# Patient Record
Sex: MALE | Race: WHITE | NOT HISPANIC OR LATINO | Employment: OTHER | URBAN - METROPOLITAN AREA
[De-identification: names, ages, dates, MRNs, and addresses within clinical notes are randomized per-mention and may not be internally consistent; named-entity substitution may affect disease eponyms.]

---

## 2019-08-27 DIAGNOSIS — I63.50 CEREBROVASCULAR ACCIDENT DUE TO CEREBRAL ARTERY OCCLUSION: Primary | ICD-10-CM

## 2019-09-04 ENCOUNTER — CLINICAL SUPPORT (OUTPATIENT)
Dept: REHABILITATION | Facility: HOSPITAL | Age: 74
End: 2019-09-04
Payer: MEDICARE

## 2019-09-04 DIAGNOSIS — R47.01 COMBINED RECEPTIVE AND EXPRESSIVE APHASIA: Primary | ICD-10-CM

## 2019-09-04 PROCEDURE — G9160 LANG COMP GOAL STATUS: HCPCS | Mod: CJ,PN

## 2019-09-04 PROCEDURE — G9159 LANG COMP CURRENT STATUS: HCPCS | Mod: CK,PN

## 2019-09-04 PROCEDURE — G9162 LANG EXPRESS CURRENT STATUS: HCPCS | Mod: CL,PN

## 2019-09-04 PROCEDURE — G9163 LANG EXPRESS GOAL STATUS: HCPCS | Mod: CL,PN

## 2019-09-04 NOTE — PROGRESS NOTES
"TIME RECORD    Date: 09/04/2019    Start Time:  1300  Stop Time:  1350    PROCEDURES:       UNTIMED  Procedure Time Min.   Speech-Language Eval Start: 1300  Stop: 1400 60   Scoring, interpreting & writing report Start: 1555  Stop: 1652 57     Total Timed Minutes:  117  Total Timed Units:  2  Total Untimed Units: 0  Charges Billed/# of units:  2    SPEECH THERAPY INITIAL PLAN OF TREATMENT    Patient Name: Agapito Bass  Physician Name: Riser    Primary Diagnosis: s/p CVA  Treatment Diagnosis: Severe Broca's aphasia (moderate receptive, severe expressive aphasia)  Onset Date: 6/7/2019  Eval Date: 9/4/2019  Certification Period: 9/4/2019 to 11/4/2019  Past Medical History: Per wife, there was no prior dysphagia or bnwwny-roosruic-tpamakvmr disorder.  She denied prior neuro, pulmonary or GI diagnoses or neck/throat surgery. Pt had aspiration pneumonia after the CVA, requiring intubation.  PEG tube was placed.  Pt had repeat MBSS on 8/23/2019 which cleared him to return to full p.o.dDiet, and PEG tube is to be removed next week.     Precautions: standard, fall  Prior Therapy:  Pt received inpatient rehab &, more recently, home health lblkpq-gtzpvvcu-zwlhwivye therapy.      Nutrition: full p.o. as of last week  Social Cultural Assessment: , retired, lives in the community with wife    Prior Level of Function: Independent  Functional Deficits Leading to Referral/Nature of Injury: moderate-severe communication deficits  Patient Therapy Goals: "talk"  Vision: adequate with eyeglasses for reading & distance  Hearing: apparent at least mild bilateral hearing loss    Receptive Language: impaired - moderate  Yes/No Response: intact to moderately complex information  Follows Direction: 1-Step - consistently indep and 2-Step - only point to tasks, did not follow complex tasks.  No left/right orientation.  Moderately decreased body part identification  Identifies pictures/objects: intact for common objects & " pictures  Auditory Comprehension of paragraph-length material: severe deficit  Reading Comprehension: matched single words to pictures 100% acc, followed 2-3 word written commands 100% acc.  Unable to comprehend written sentences.  Comments: due to hearing loss, pt must be cued to look at speaker to improve comprehension of information    Expressive Language: impaired - severe  Naming: severely impaired, paraphasias, neologisms & jargon  Repetition: mild-moderately impaired, with paraphasias, & neologisms  Sentence Completion: moderately impaired  Responsive naming: severely impaired  Spontaneous Speech: Non-fluent; single words, often paraphasias, effortful & hesitnat  Written Language: Signs Name: intact with dominant hand      Speech:  Intelligibility: Mod.dysarthria, decreased rate & precision of consonants, flat prosody,   Voice Quality: WFL  Pitch: WFL  Intensity: WFL  Apraxia: no oral or limb apraxia; moderate verbal apraxia noted as he was able to repeat to 4 syallable words, but exhibited moderate phonemic substitutions, repetition, dysprosody  Oral Mechanism:  Dentition - edentulous with upper plate only.    Function - WFL.   ROM - WFL, slight right labial/facial weakness, decreased pursing & seal with constant saliva loss (pt carries handkerchief to wipe mouth).    Cognition not tested due to severe expressive aphasia    Impressions: Per the Western Aphasia Battery (WAB) pt has Broca's aphasia with moderate receptive & severe expressive deficits.  Voice was intact.  No dysphagia, as pt was cleared for po diet last week per MBSS, and PEG tube will be removed next week.  Moderate dysarthria, moderate verbal apraxia.      Rehab Potential: good  Short Term Goals:   1. Complete assessment of reading & writing skills.  2. Follow 3 step directions presented verbally re body parts, left/right orientation, and objects, indep 75% trials.  3. Demo comprehension of moderately complex information he read (to 4  sentence paragraphs), indep, 75% acc.  4. Repeat 10 syllable sentence with 90% intelligibility.  5. Name pictures of 100 common objects given phonemic cues, 90% acc.  6. Further evaluation of augmentative system to improve communication of wants & needs.    Long Term Goals   1. Express wants & needs using noun-verb combinations and/or low-tech communication board, indep  2. Comprehend moderately complex information presented verbally about functional daily activities, SBA.    Recommended Treatment Plan (2 times per week for 9 weeks): Home Exercise Program, Compensatory Strategies, Facilitating Strategies and Language Re-Training      Therapist's Name: Claudia Chaudhry M.S., CCC-SLP/A   Date: 09/04/2019    I CERTIFY THE NEED FOR THESE SERVICES FURNISHED UNDER THIS PLAN OF TREATMENT AND WHILE UNDER MY CARE    Physician's comments: ________________________________________________________________________________________________________________________________________________      Physician's Name: ___________________________________

## 2019-09-09 ENCOUNTER — CLINICAL SUPPORT (OUTPATIENT)
Dept: REHABILITATION | Facility: HOSPITAL | Age: 74
End: 2019-09-09
Payer: MEDICARE

## 2019-09-09 DIAGNOSIS — R47.01 COMBINED RECEPTIVE AND EXPRESSIVE APHASIA: Primary | ICD-10-CM

## 2019-09-09 PROCEDURE — 92507 TX SP LANG VOICE COMM INDIV: CPT | Mod: PN

## 2019-09-09 NOTE — PROGRESS NOTES
"TIME RECORD    Date:  09/09/2019    Start Time:  0905   Stop Time:  0950    PROCEDURES:    UNTIMED  Procedure Time Min.   Speech language tx Start: 0905  Stop: 0950 45    Start:  Stop:     Start:  Stop:     Start:  Stop:    Total Untimed Units:  1  Charges Billed/# of units:  1      Progress/Current Status    Subjective:     Patient ID: Agapito Bass is a 73 y.o. male.  Diagnosis:   1. Combined receptive and expressive aphasia       "How are you" "okay"    Objective:     1. Complete assessment of reading & writing skills.  2. Follow 3 step directions presented verbally re body parts, left/right orientation, and objects, indep 75% trials.  3. Demo comprehension of moderately complex information he read (to 4 sentence paragraphs), indep, 75% acc.  4. Repeat 10 syllable sentence with 90% intelligibility.  5. Name pictures of 100 common objects given phonemic cues, 90% acc.  6. Further evaluation of augmentative system to improve communication of wants & needs. MET    Assessment:   Alert & cooperative, wife present.  Overall imprecise artic, struggle, substitutions & omissions.  2. Imitation using mirror & touch cues for OMEx  4. Repeated single syllable initial /b/ words, given side by side in mirror cues.  Increased stress with prolongation of final sounds( cathryn. /l,s,sh), and emphasis on /t,k,g,d/.  6. Wife reported they successfully use a written word augmentative system at home, established at rehab unit, for pt to point to to communicate pain, wants & needs when he cannot state them.  Pt was pre-morbid a poor speller.      Patient Education/Response:     Education to pt & wife re Home Exercise Program with printed instructions for OMEx & provision of tongue blades, & words to practice, with demo using mirror & gestures for wife to use to facilitate correct activities.    Plans and Goals:     Cont POC    "

## 2019-09-11 ENCOUNTER — CLINICAL SUPPORT (OUTPATIENT)
Dept: REHABILITATION | Facility: HOSPITAL | Age: 74
End: 2019-09-11
Payer: MEDICARE

## 2019-09-11 DIAGNOSIS — R47.01 COMBINED RECEPTIVE AND EXPRESSIVE APHASIA: Primary | ICD-10-CM

## 2019-09-11 PROCEDURE — 92507 TX SP LANG VOICE COMM INDIV: CPT | Mod: PN

## 2019-09-11 NOTE — PROGRESS NOTES
"TIME RECORD    Date:  09/11/2019    Start Time:  0900  Stop Time:  0945    PROCEDURES:       UNTIMED  Procedure Time Min.   Speech-language tx Start: 0900  Stop: 0945 45    Start:  Stop:    Total Untimed Units:  1  Charges Billed/# of units:  1      Progress/Current Status    Subjective:     Patient ID: Agapito Bass is a 73 y.o. male.  Diagnosis: No diagnosis found.    "How are you, you?"     Objective:      1. Complete assessment of reading & writing skills.  2. Follow 3 step directions presented verbally re body parts, left/right orientation, and objects, indep 75% trials.  3. Demo comprehension of moderately complex information he read (to 4 sentence paragraphs), indep, 75% acc.  4. Repeat 10 syllable sentence with 90% intelligibility.  5. Name pictures of 100 common objects given phonemic cues, 90% acc.  6. Further evaluation of augmentative system to improve communication of wants & needs. MET     Assessment:   Alert & cooperative, wife present.  Overall imprecise artic, struggle, substitutions & omissions.  1. Completed using WAB Section 2 which revealed severe reading & writing deficits (scores at 30% for each), moderate limb apraxia, and mild-moderate constructional/visuo-spatial deficits, but WFL calculation skills.     Patient Education/Response:      Copies of words to work on to address /t, k/ during Home Exercise Program for the next 5 days.      Plans and Goals:      Cont POC      "

## 2019-09-16 ENCOUNTER — CLINICAL SUPPORT (OUTPATIENT)
Dept: REHABILITATION | Facility: HOSPITAL | Age: 74
End: 2019-09-16
Payer: MEDICARE

## 2019-09-16 DIAGNOSIS — R47.01 COMBINED RECEPTIVE AND EXPRESSIVE APHASIA: Primary | ICD-10-CM

## 2019-09-16 PROCEDURE — 92507 TX SP LANG VOICE COMM INDIV: CPT | Mod: PN

## 2019-09-16 NOTE — PROGRESS NOTES
"TIME RECORD    Date:  09/16/2019    Start Time:  0900  Stop Time:  0945     PROCEDURES:                                        UNTIMED  Procedure Time Min.   Speech-language tx Start: 0900  Stop: 0945 45     Start:  Stop:     Total Untimed Units:  1  Charges Billed/# of units:  1        Progress/Current Status     Subjective:      Patient ID: Agapito Bass is a 73 y.o. male.  Diagnosis: Combined receptive & expressive aphasia     "I'm okay"     Objective:      1. Complete assessment of reading & writing skills. MET  2. Follow 3 step directions presented verbally re body parts, left/right orientation, and objects, indep 75% trials.  3. Demo comprehension of moderately complex information he read (to 4 sentence paragraphs), indep, 75% acc.  4. Repeat 10 syllable sentence with 90% intelligibility.  5. Name pictures of 100 common objects given phonemic cues, 90% acc.  6. Further evaluation of augmentative system to improve communication of wants & needs. MET     Assessment:   Alert & cooperative, wife present.  Overall imprecise artic, struggle, substitutions & omissions.  4. Repeated 1 syllable CV, VC & CVC words given line drawing stimuli & cue to look at therapists mouth, 80% acc. (paraphasias noted)    5. After 2 practice trials, given line drawings (same stimuli as in 4) pt named 1 of 20 indep.  Repeated all with 80% acc (paraphasias noted)     Patient Education/Response:      Copies of words to work on to address /t,d, p,b, k,g/ during Home Exercise Program for the next 7 days.  Review of tx techniques with pt & wife.     Plans and Goals:      Cont POC    "

## 2019-09-18 ENCOUNTER — CLINICAL SUPPORT (OUTPATIENT)
Dept: REHABILITATION | Facility: HOSPITAL | Age: 74
End: 2019-09-18
Payer: MEDICARE

## 2019-09-18 DIAGNOSIS — R47.01 COMBINED RECEPTIVE AND EXPRESSIVE APHASIA: Primary | ICD-10-CM

## 2019-09-18 PROCEDURE — 92507 TX SP LANG VOICE COMM INDIV: CPT | Mod: PN

## 2019-09-18 NOTE — PROGRESS NOTES
"TIME RECORD    Date:  09/18/2019    Start Time:  0900  Stop Time:  0945     PROCEDURES:     UNTIMED  Procedure Time Min.   Speech-language tx Start: 0900  Stop: 0945 45     Start:  Stop:     Total Untimed Units:  1  Charges Billed/# of units:  1        Progress/Current Status     Subjective:      Patient ID: Agapito Bass is a 73 y.o. male.  Diagnosis: Combined receptive & expressive aphasia     "I uh, uh, three, uh, ...)"     Objective:      1. Complete assessment of reading & writing skills. MET  2. Follow 3 step directions presented verbally re body parts, left/right orientation, and objects, indep 75% trials.  3. Demo comprehension of moderately complex information he read (to 4 sentence paragraphs), indep, 75% acc.  4. Repeat 10 syllable sentence with 90% intelligibility.  5. Name pictures of 100 common objects given phonemic cues, 90% acc.  6. Further evaluation of augmentative system to improve communication of wants & needs. MET     Assessment:   Alert & cooperative, wife present.  Wife translated he wanted to say he woke up at 3 with a panic attack..  Overall imprecise artic, struggle, substitutions & omissions.  4, 5. Repeated 1 syllable /t,d,p,b,k,g/ CV, VC & CVC initial & final sound words, & CVC with initial blends words, given line drawing and picture stimuli & cue to look at therapists mouth, 80% acc. (paraphasias noted, vowel distortions noted)         Patient Education/Response:      Education re techniques to reduce struggle in verbal apraxia, focus on "stop, wait, then try again" with success on 90% trials to produce target word.     Plans and Goals:      Cont POC    "

## 2019-09-23 ENCOUNTER — CLINICAL SUPPORT (OUTPATIENT)
Dept: REHABILITATION | Facility: HOSPITAL | Age: 74
End: 2019-09-23
Payer: MEDICARE

## 2019-09-23 DIAGNOSIS — R47.01 COMBINED RECEPTIVE AND EXPRESSIVE APHASIA: Primary | ICD-10-CM

## 2019-09-23 PROCEDURE — 92507 TX SP LANG VOICE COMM INDIV: CPT | Mod: PN

## 2019-09-23 NOTE — PROGRESS NOTES
TIME RECORD    Date:  09/23/2019    Start Time:  0900   Stop Time:  0945    PROCEDURES:        UNTIMED  Procedure Time Min.   Speech-language tx Start: 0900  Stop: 0945 45    Start:  Stop:      Total Untimed Units:  1  Charges Billed/# of units:  1      Progress/Current Status    Subjective:     Patient ID: Agapito Bass is a 74 y.o. male.  Diagnosis:   1. Combined receptive and expressive aphasia       Uh, yeah, uh, good    Objective:     2. Follow 3 step directions presented verbally re body parts, left/right orientation, and objects, indep 75% trials.  3. Demo comprehension of moderately complex information he read (to 4 sentence paragraphs), indep, 75% acc.  4. Repeat 10 syllable sentence with 90% intelligibility.  5. Name pictures of 100 common objects given phonemic cues, 90% acc.    Assessment:     2. 1 step 90% acc indep.  2 step 70% acc indep, 30% in imitation.  Notable improvement in L/R orientation  3. Read simple sentences to himself & pointed to correct answer when asked, 80% acc indep.  When reading aloud comprehension decreased significantly (due to struggle to say the correct word?)  4. Repeated 3 syllable sentences given cues to look at speaker's face & exaggerated oral movements, 40% intelligibility.  Repeated 2 syllable contrasting words with p/b, t/d, k/g with 60% acc.    Patient Education/Response:     Copies of body part commands given to pt & wife for HEP    Plans and Goals:     Cont POC

## 2019-09-25 ENCOUNTER — CLINICAL SUPPORT (OUTPATIENT)
Dept: REHABILITATION | Facility: HOSPITAL | Age: 74
End: 2019-09-25
Payer: MEDICARE

## 2019-09-25 DIAGNOSIS — R47.01 COMBINED RECEPTIVE AND EXPRESSIVE APHASIA: Primary | ICD-10-CM

## 2019-09-25 PROCEDURE — 92507 TX SP LANG VOICE COMM INDIV: CPT | Mod: PN

## 2019-09-25 PROCEDURE — G9160 LANG COMP GOAL STATUS: HCPCS | Mod: CI,PN

## 2019-09-25 PROCEDURE — G9159 LANG COMP CURRENT STATUS: HCPCS | Mod: CJ,PN

## 2019-09-25 NOTE — PROGRESS NOTES
"TIME RECORD    Date:  09/25/2019    Start Time:  0900  Stop Time:  0945    PROCEDURES:        UNTIMED  Procedure Time Min.   Speech-language tx Start: 0900  Stop: 0945 45    Start:  Stop:      Total Untimed Units:  1  Charges Billed/# of units:  1      Progress/Current Status    Subjective:     Patient ID: Agapito Bass is a 74 y.o. male.  Diagnosis:   1. Combined receptive and expressive aphasia         "Good"    Objective:      2. Follow 3 step directions presented verbally re body parts, left/right orientation, and objects, indep 75% trials.  3. Demo comprehension of moderately complex information he read (to 4 sentence paragraphs), indep, 75% acc.  4. Repeat 10 syllable sentence with 90% intelligibility.  5. Name pictures of 100 common objects given phonemic cues, 90% acc    Assessment:     3. 1 sentence paragraphs 90% acc indep  4. 3-4 syllable phrases immediately in imitation or after 5 sec delay 50% acc.  5. Imitation only 1 syllable words, focus on /d,k,g,f/    Patient Education/Response:     Review of HEP activities    Plans and Goals:     Cont POC    "

## 2019-10-07 ENCOUNTER — CLINICAL SUPPORT (OUTPATIENT)
Dept: REHABILITATION | Facility: HOSPITAL | Age: 74
End: 2019-10-07
Payer: MEDICARE

## 2019-10-07 PROCEDURE — 92507 TX SP LANG VOICE COMM INDIV: CPT | Mod: PN

## 2019-10-07 PROCEDURE — G8999 MOTOR SPEECH CURRENT STATUS: HCPCS | Mod: CK,PN

## 2019-10-07 NOTE — PROGRESS NOTES
"TIME RECORD    Date:  10/07/2019    Start Time:  0900  Stop Time:  0945     PROCEDURES:                                                    UNTIMED  Procedure Time Min.   Speech-language tx Start: 0900  Stop: 0945 45     Start:  Stop:        Total Untimed Units:  1  Charges Billed/# of units:  1        Progress/Current Status     Subjective:      Patient ID: Agapito Bass is a 74 y.o. male.  Diagnosis:   1. Combined receptive and expressive aphasia            "Good"     Objective:      2. Follow 3 step directions presented verbally re body parts, left/right orientation, and objects, indep 75% trials.  3. Demo comprehension of moderately complex information he read (to 4 sentence paragraphs), indep, 75% acc.  4. Repeat 10 syllable sentence with 90% intelligibility.  5. Name pictures of 100 common objects given phonemic cues, 90% acc     Assessment:    Was in the hospital last week for CHF.  Wife stated they do HEP everyday.    4. 3-4 syllable phrases immediately in imitation or after 5 sec delay 50% acc.  5. Imitation only 1-2 syllable words, focus on /d,k,g/  Significant struggle cathryn. /k/      Patient Education/Response:      Demo of use of 3-4 word phrases/sentences fduring home activities e.g. I want toast, I like oatmeal, I take a walk, etc.     Plans and Goals:      Cont POC    "

## 2019-10-09 ENCOUNTER — CLINICAL SUPPORT (OUTPATIENT)
Dept: REHABILITATION | Facility: HOSPITAL | Age: 74
End: 2019-10-09
Payer: MEDICARE

## 2019-10-09 DIAGNOSIS — R47.01 COMBINED RECEPTIVE AND EXPRESSIVE APHASIA: Primary | ICD-10-CM

## 2019-10-09 PROCEDURE — 92507 TX SP LANG VOICE COMM INDIV: CPT | Mod: PN

## 2019-10-09 NOTE — PROGRESS NOTES
"TIME RECORD    Date:  10/09/2019    Start Time:  0902  Stop Time:  0947     PROCEDURES:     UNTIMED  Procedure Time Min.   Speech-language tx Start: 0902  Stop: 0947 45     Start:  Stop:        Total Untimed Units:  1  Charges Billed/# of units:  1        Progress/Current Status     Subjective:      Patient ID: Agapito Bass is a 74 y.o. male.  Diagnosis:   1. Combined receptive and expressive aphasia            "I'm better"     Objective:      2. Follow 3 step directions presented verbally re body parts, left/right orientation, and objects, indep 75% trials.  3. Demo comprehension of moderately complex information he read (to 4 sentence paragraphs), indep, 75% acc.  4. Repeat 10 syllable sentence with 90% intelligibility.  5. Name pictures of 100 common objects given phonemic cues, 90% acc     Assessment:    Wife stated friends visited last night & he conversed with them much better than previously.      4. 3-5 syllable phrases immediately in imitation or after 5 sec delay 60% acc.  Struggle on /k, f/.  Able to imitate 3 syllable words with little problem.  Vowel distortions persist.    Patient Education/Response:      Demo of use of finger to spell on the table (with good response by communication partner to get the word he could not say), encouragement to write words he cannot speak, educ to wife re these techniques.     Plans and Goals:      Cont POC    "

## 2019-10-14 ENCOUNTER — CLINICAL SUPPORT (OUTPATIENT)
Dept: REHABILITATION | Facility: HOSPITAL | Age: 74
End: 2019-10-14
Payer: MEDICARE

## 2019-10-14 DIAGNOSIS — R47.01 COMBINED RECEPTIVE AND EXPRESSIVE APHASIA: Primary | ICD-10-CM

## 2019-10-14 PROCEDURE — G9160 LANG COMP GOAL STATUS: HCPCS | Mod: CH,PN

## 2019-10-14 PROCEDURE — 92507 TX SP LANG VOICE COMM INDIV: CPT | Mod: PN

## 2019-10-14 PROCEDURE — G9159 LANG COMP CURRENT STATUS: HCPCS | Mod: CI,PN

## 2019-10-14 NOTE — PROGRESS NOTES
"TIME RECORD    Date:  10/14/2019    Start Time:  0902  Stop Time:  0947     PROCEDURES:     UNTIMED  Procedure Time Min.   Speech-language tx Start: 0902  Stop: 0947 45     Start:  Stop:        Total Untimed Units:  1  Charges Billed/# of units:  1        Progress/Current Status     Subjective:      Patient ID: Agapito Bass is a 74 y.o. male.  Diagnosis:   1. Combined receptive and expressive aphasia            "I'm good."     Objective:      2. Follow 3 step directions presented verbally re body parts, left/right orientation, and objects, indep 75% trials.  3. Demo comprehension of moderately complex information he read (to 4 sentence paragraphs), indep, 75% acc.  4. Repeat 10 syllable sentence with 90% intelligibility.  5. Name pictures of 100 common objects given phonemic cues, 90% acc     Assessment:    Wife stated talks better when with her & with friends than in sessions.     4. Repeated 3-4 word phrases 80% acc.  Improved self-generation of "I want" and "I like" sentences given picture stim.  Unable to produce "don't" in a sentence.   Vowel distortions persist.  5. Named 30 single syllable pictures in imitation, 9 indep.     Patient Education/Response:      Continue HEP using simple "I like" and "I want" sentences.     Plans and Goals:      Cont POC    "

## 2019-10-16 ENCOUNTER — CLINICAL SUPPORT (OUTPATIENT)
Dept: REHABILITATION | Facility: HOSPITAL | Age: 74
End: 2019-10-16
Payer: MEDICARE

## 2019-10-16 DIAGNOSIS — R47.01 COMBINED RECEPTIVE AND EXPRESSIVE APHASIA: Primary | ICD-10-CM

## 2019-10-16 PROCEDURE — 92507 TX SP LANG VOICE COMM INDIV: CPT | Mod: PN

## 2019-10-16 NOTE — PROGRESS NOTES
"Outpatient [facility]rological Rehabilitation   Speech and Language Therapy Daily Note  Date:  10/16/2019     Name: Agapito Bass   MRN: [id number]   Therapy Diagnosis:   Encounter Diagnosis   Name Primary?    Combined receptive and expressive aphasia Yes   Physician: César Mejia MD  Physician Orders: ambulatory referral to speech therapy  Medical Diagnosis: CVA    Visit #/Visits authorized: 11/18  Date of Evaluation:  9/4/2019  Insurance Authorization Period: 9/4-11/4/2019  Plan of Care Expiration Date:    11/4/2019  Extended POC:  no     Time In:  0904  Time Out:  0955  Total Billable Time: 51     Precautions: Standard and Fall    Subjective:   Pt reports: "I'm ok"   He  was compliant to home exercise program: per wife he works with her every day on OMEXx, repeating words, following 1 step commands  Response to previous treatment: no concerns   Pain Scale:  0/10 on VAS currently.   Pain Location: na    Objective:     UNTIMED  Procedure Min.   {Speech- Language- Voice Therapy  51           Total Untimed Units: 1  Charges Billed/# of units: 1    2. Follow 3 step directions presented verbally re body parts, left/right orientation, and objects, indep 75% trials. Imitated 1 step at 90% acc; followed 1 step verbal directions without visual cues 40% acc.  3. Demo comprehension of moderately complex information he read (to 4 sentence paragraphs), indep, 75% acc. Demo'd comprehension of single sentences he read, 90% acc.  4. Repeat 10 syllable sentence with 90% intelligibility. Repeated up to 6 syllable sentences with fair intelligibility (distorted vowels, incorrect consonants) given visual & verbal model.  5. Name pictures of 100 common objects given phonemic cues, 90% acc. Named 2 of 25 common pix he had reviewed at several prior sessions, indep.    Patient Education/Response:   Education to pt & wife re continuing tOMEX & single step commands with objects & body parts, always providing a model if he does not correctly " comply.    Exercises were reviewed.  Agapito demonstrated good  understanding of the education provided.     Assessment:   Agapito is progressing well towards his goals.  Current goals remain appropriate. Goals to be updated as necessary.     Pt prognosis is Good. Pt will continue to benefit from skilled outpatient speech and language therapy to address the deficits listed in the problem list on initial evaluation, provide pt/family education and to maximize pt's level of independence in the home and community environment.     Barriers to Therapy: none  Pt's spiritual, cultural and educational needs considered and pt agreeable to plan of care and goals.    Plan:   Continue POC with focus on effectively communicating using any method.      Wife will have knee surgery after his session on 10/23 next week & pt will be unable to attend sessions until she can drive again, after the first of December.  Pt will be d/c on 10/23.  They can request a new order from Dr. Mejia once she is able to drive him to sessions again.    10/16/2019

## 2019-10-21 ENCOUNTER — CLINICAL SUPPORT (OUTPATIENT)
Dept: REHABILITATION | Facility: HOSPITAL | Age: 74
End: 2019-10-21
Payer: MEDICARE

## 2019-10-21 DIAGNOSIS — R47.01 COMBINED RECEPTIVE AND EXPRESSIVE APHASIA: Primary | ICD-10-CM

## 2019-10-21 PROCEDURE — 92507 TX SP LANG VOICE COMM INDIV: CPT | Mod: PN

## 2019-10-21 NOTE — PROGRESS NOTES
"Outpatient Neurological Rehabilitation   Speech and Language Therapy Daily Note  Date:  10/21/2019     Name: Agapito Bass   MRN: [id number]   Therapy Diagnosis:        Encounter Diagnosis   Name Primary?    Combined receptive and expressive aphasia Yes   Physician: César Mejia MD  Physician Orders: ambulatory referral to speech therapy  Medical Diagnosis: CVA     Visit #/Visits authorized: 12/18  Date of Evaluation:  9/4/2019  Insurance Authorization Period: 9/4-11/4/2019  Plan of Care Expiration Date:    11/4/2019  Extended POC:  no      Time In:  0900  Time Out:  0945  Total Billable Time: 45      Precautions: Standard and Fall     Subjective:   Pt reports: "I'm ok"   He  was compliant to home exercise program: per wife he works with her every day on OMEXx, repeating words, following 1 step commands  Response to previous treatment: no concerns   Pain Scale:  0/10 on VAS currently.   Pain Location: na     Objective:      UNTIMED  Procedure Min.   {Speech- Language- Voice Therapy  45            Total Untimed Units: 1  Charges Billed/# of units: 1     2. Follow 3 step directions presented verbally re body parts, left/right orientation, and objects, indep 75% trials. Imitated 1 step at 90% acc; followed 1 step verbal directions without visual cues 50% acc.  3. Demo comprehension of moderately complex information he read (to 4 sentence paragraphs), indep, 75% acc. Demo'd comprehension of single words he read, 90% acc.  4. Repeat 10 syllable sentence with 90% intelligibility. Repeated up to 6 syllable sentences with fair intelligibility (distorted vowels, incorrect consonants) given visual & verbal model. 10% accurate repetition without visual model  5. Name pictures of 100 common objects given phonemic cues, 90% acc. Named 4 of 15 common pix during responsive naming tasks, indep, Phonemic cues alone did not increase naming accuracy.       Patient Education/Response:   Education to pt & wife re continuing OMEX & " single step commands with objects & body parts, always providing a model if he does not correctly comply.  Provided alphabet board with vowels on left, educated re use for first letter(s) of target words with return demo; used given min-modA to name breakfast foods he had this morning, and name his cats & dogs.  Pt had significant difficulty choosing the correct letter to start on 70%t rials, but did improve to 50% by end of session.        Exercises were reviewed.  Agapito demonstrated good  understanding of the education provided.      Assessment:   Agapito is progressing well towards his goals.  Current goals remain appropriate. Goals to be updated as necessary.      Pt prognosis is good. Pt will continue to benefit from skilled outpatient speech and language therapy to address the deficits listed in the problem list on initial evaluation, provide pt/family education and to maximize pt's level of independence in the home and community environment.      Barriers to Therapy: none  Pt's spiritual, cultural and educational needs considered and pt agreeable to plan of care and goals.     Plan:   Continue POC with focus on effectively communicating using any method.       Wife will have knee surgery after his next session & pt will be unable to attend sessions until she can drive again, after the first of December.  Pt will be d/c on 10/23.  They can request a new order from Dr. Mejia once she is able to drive him to sessions again.    10/21/2019

## 2019-10-23 ENCOUNTER — CLINICAL SUPPORT (OUTPATIENT)
Dept: REHABILITATION | Facility: HOSPITAL | Age: 74
End: 2019-10-23
Attending: FAMILY MEDICINE
Payer: MEDICARE

## 2019-10-23 DIAGNOSIS — R47.01 COMBINED RECEPTIVE AND EXPRESSIVE APHASIA: Primary | ICD-10-CM

## 2019-10-23 PROCEDURE — 92507 TX SP LANG VOICE COMM INDIV: CPT | Mod: PN

## 2019-10-23 NOTE — PROGRESS NOTES
"Outpatient Neurological Rehabilitation   Speech and Language Therapy Daily Note  Date:  10/23/2019     Name: Agapito Bass   MRN: [id number]   Therapy Diagnosis:           Encounter Diagnosis   Name Primary?    Combined receptive and expressive aphasia Yes     Physician: César Mejia MD  Physician Orders: ambulatory referral to speech therapy  Medical Diagnosis: CVA     Visit #/Visits authorized: 13/18  Date of Evaluation:  9/4/2019  Insurance Authorization Period: 9/4-11/4/2019  Plan of Care Expiration Date:    11/4/2019  Extended POC:  no      Time In:  0905  Time Out:  0950  Total Billable Time: 45      Precautions: Standard and Fall     Subjective:   Pt reports: "Cold"   He  was compliant to home exercise program: per wife he works with her every day on OMEX, repeating words, following 1 step commands.  She started him using the alphabet board trained on Monday.  She said he wont do the saliva collection exercise.  Response to previous treatment: no concerns   Pain Scale:  0/10 on VAS currently.   Pain Location: na     Objective:      UNTIMED  Procedure Min.   Speech- Language- Voice Therapy  45            Total Untimed Units: 1  Charges Billed/# of units: 1     2. Follow 3 step directions presented verbally re body parts, left/right orientation, and objects, indep 75% trials. Imitated 1 step at 90% acc; followed 1 step verbal directions without visual cues 70% acc.  Followed 2 step verbal directions re body parts, L/R orientation & items in the room 40% acc indep  3. Demo comprehension of moderately complex information he read (to 4 sentence paragraphs), indep, 75% acc. Demo'd comprehension of 2 word phrases he read, 50% acc.  4. Repeat 10 syllable sentence with 90% intelligibility. Repeated up to 5 syllable sentences with fair intelligibility (distorted vowels, incorrect consonants) given visual & verbal model. struggle & groping are predominant  5. Name pictures of 100 common objects given phonemic cues, " "90% acc. Named 4 of 15 common pix during responsive naming tasks, indep, Phonemic cues alone did not increase naming accuracy.       Patient Education/Response:   Education to pt & wife re continuing OMEX & single step commands with objects & body parts, always providing a model if he does not correctly comply.  During the next 6 weeks (her recovery from knee surgery) she will introduce 2 step simple commands, continue practice with "I want...", and model noun-verb combinations (go out, eat lunch, feed cat, etc.), work on names of family, friends, Jew members, etc., and start a new set of pictures for him to name as she reported he has learned all the flash cards she has been using.  Provided laminated alphabet board with vowels on left, practiced use during conversation.        Exercises were reviewed.  Agapito demonstrated good understanding of the education provided.      Assessment:   Agapito is progressing well towards his goals.  Pt prognosis is good. Pt will  benefit from skilled outpatient speech and language therapy to address the deficits listed in the problem list on initial evaluation, provide pt/family education and to maximize pt's level of independence in the home and community environment.      Barriers to Therapy: none  Pt's spiritual, cultural and educational needs considered and pt agreeable to plan of care and goals.     Plan:   Wife will have knee surgery next week, so he will be unable to attend sessions until she can drive again, after the first of December.     10/23/2019       REHAB SERVICES OUTPATIENT DISCHARGE SUMMARY  Speech Therapy    Name:  Agapito Bass  Total # Of Visits:  13  Cancelled:  2  No Shows:  0  Diagnosis:    1. Combined receptive and expressive aphasia         The patient is to be discharged from our Therapy service for the following reason(s):  Due to wife's knee surgery, they must take a break until the beginning of December.  Hanna will request a new order from Dr. Mejia " once she is able to drive him to sessions again.    Degree of Goal Achievement:  Patient has partially met goals    Discharge Plan:  Home Program:  Extensive education re activities during the hiatus.

## 2019-12-19 ENCOUNTER — CLINICAL SUPPORT (OUTPATIENT)
Dept: CARDIAC REHAB | Facility: HOSPITAL | Age: 74
End: 2019-12-19
Payer: MEDICARE

## 2019-12-19 DIAGNOSIS — I50.9 CHF (CONGESTIVE HEART FAILURE): Primary | ICD-10-CM

## 2019-12-19 PROCEDURE — 93797 PHYS/QHP OP CAR RHAB WO ECG: CPT

## 2019-12-20 ENCOUNTER — CLINICAL SUPPORT (OUTPATIENT)
Dept: CARDIAC REHAB | Facility: HOSPITAL | Age: 74
End: 2019-12-20
Payer: MEDICARE

## 2019-12-20 DIAGNOSIS — I50.9 CHF (CONGESTIVE HEART FAILURE): ICD-10-CM

## 2019-12-20 PROCEDURE — 93798 PHYS/QHP OP CAR RHAB W/ECG: CPT

## 2019-12-23 ENCOUNTER — CLINICAL SUPPORT (OUTPATIENT)
Dept: CARDIAC REHAB | Facility: HOSPITAL | Age: 74
End: 2019-12-23
Payer: MEDICARE

## 2019-12-23 DIAGNOSIS — I50.9 CHF (CONGESTIVE HEART FAILURE): ICD-10-CM

## 2019-12-23 PROCEDURE — 93798 PHYS/QHP OP CAR RHAB W/ECG: CPT

## 2019-12-27 ENCOUNTER — CLINICAL SUPPORT (OUTPATIENT)
Dept: CARDIAC REHAB | Facility: HOSPITAL | Age: 74
End: 2019-12-27
Payer: MEDICARE

## 2019-12-27 DIAGNOSIS — I50.9 CHF (CONGESTIVE HEART FAILURE): ICD-10-CM

## 2019-12-27 PROCEDURE — 93798 PHYS/QHP OP CAR RHAB W/ECG: CPT

## 2019-12-30 ENCOUNTER — CLINICAL SUPPORT (OUTPATIENT)
Dept: CARDIAC REHAB | Facility: HOSPITAL | Age: 74
End: 2019-12-30
Payer: MEDICARE

## 2019-12-30 DIAGNOSIS — I50.9 CHF (CONGESTIVE HEART FAILURE): ICD-10-CM

## 2019-12-30 PROCEDURE — 93798 PHYS/QHP OP CAR RHAB W/ECG: CPT

## 2019-12-31 DIAGNOSIS — I66.02 OCCLUSION AND STENOSIS OF LEFT MIDDLE CEREBRAL ARTERY: Primary | ICD-10-CM

## 2020-01-06 ENCOUNTER — CLINICAL SUPPORT (OUTPATIENT)
Dept: CARDIAC REHAB | Facility: HOSPITAL | Age: 75
End: 2020-01-06
Payer: MEDICARE

## 2020-01-06 DIAGNOSIS — I50.9 CHF (CONGESTIVE HEART FAILURE): ICD-10-CM

## 2020-01-06 PROCEDURE — 93798 PHYS/QHP OP CAR RHAB W/ECG: CPT

## 2020-01-08 ENCOUNTER — CLINICAL SUPPORT (OUTPATIENT)
Dept: REHABILITATION | Facility: HOSPITAL | Age: 75
End: 2020-01-08
Attending: FAMILY MEDICINE
Payer: MEDICARE

## 2020-01-08 DIAGNOSIS — R47.01 COMBINED RECEPTIVE AND EXPRESSIVE APHASIA: Primary | ICD-10-CM

## 2020-01-08 PROCEDURE — 92523 SPEECH SOUND LANG COMPREHEN: CPT | Mod: PN

## 2020-01-08 NOTE — PLAN OF CARE
Outpatient Neurological Rehabilitation  Speech and Language Therapy Evaluation    Date: 1/8/2020     Name: Agapito Bass   MRN: [id number]    Therapy Diagnosis:   Encounter Diagnosis   Name Primary?    Combined receptive and expressive aphasia Yes    Physician: César Mejia MD  Physician Orders: Ambulatory referral to speech therapy  Medical Diagnosis from Referral: CVA    Visit #/Visits authorized: 1/ 12  Date of Evaluation:  1/8/2020   Insurance Authorization Period: 1/13/2020 1/12/2021   Plan of Care Expiration:  1/8/2020 to 4/1/2020     Time In: 1200  Time Out: 1250  Total Billable Time: 50 minutes  Procedure Min.   Speech Language Evaluation   50           Precautions:Standard  Subjective   Date of Onset: 6/7/2019  History of Current Condition:   Pt reported he continues to have difficulty saying words, but can communicate pretty well with his wife.  Wife, Hanna, reported she seems to be getting better at understanding him.  He speaks more fluently at home according to both of them. He has a baseline, mild-moderate, bilateral hearing impairment.  Past Medical History: Agapito Bass  has no past medical history on file.  Agapito Bass  has no past surgical history on file.  Medical Hx and Allergies:  Agapito currently has no medications in their medication list. Review of patient's allergies indicates:  Allergies not on file  Imaging: none recent   Prior Therapy:  9/4/2019 - 10/23/2019 here.  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  Prior Level of Function: indep prior to CVA in June  Current Level of Function: When d/c in October, due to wife's knee surgery, pt demo'd mild receptive aphasia, and moderately severe expressive aphasia largely due to verbal apraxia.     Pain:   0/10  Pain Location / Description: NA  Nutrition:  Po, regular textures &liquids  Patient's  Therapy Goals:  talk  Objective   Pt was seen for resumption of speech therapy.  Portions of the Western Aphasia Battery were administered, with finding of mild receptive aphasia for complex information presented verbally & what he read.  He continued to demonstrate difficulty identifying body parts.  Expressively, he was able to repeat up to 3 syllable words, with more difficulty with blends, & ch/sh sounds; he spontaneously named only 2 of 20 common objects, requiring semantic or phonemic cues for all the others.  WOrd fluency, sentence completions  & responsive speech were moderately impaired.  Writing was legible but pt was unable to spell beyond his own name, even given verbal spelling assist.       Treatment   Treatment Time In: n/a  Treatment Time Out: n/a  Total Treatment Time: n/a  No treatment performed 2/2 time to administer evaluation    Education: Plan of Care and role of SLP in care.  Patient and wife expressed understanding.     Assessment   Agapito is a 74 y.o. male referred to outpatient SpeechTherapy with a medical diagnosis of s/p CVA. Pt presented with mild receptive & moderately severe expressive aphasia with verbal apraxia.  He demonstrates impairments including limitations as described in the problem list. Positive prognostic factors include desire to improve speech utput & strong support & participation by wife, who attended every session previously & assisted pt to complete home program.  Negative prognostic factors include severity of verbal apraxia. Patient will benefit from skilled, outpatient neurological rehabilitation speech therapy.    Rehab Potential: good  Pt's spiritual, cultural and educational needs considered and pt agreeable to plan of care and goals.    Short Term Goals 2x per week for 45 minutes sessions for 12 weeks (by 4/1/2020)  1. Perform automatic speech tasks, indep on request, 90% acc.  2. Copy common words to identify pictures, indep. 100% acc  3. Follow 3 step  directions re body parts, left/right orientation & objects, 90% acc indep.  4. Demo comprehension of moderately complex info he read, (to 4 sentence paragraphs) indep, 100% acc.  5. Name pictures of 100 common objects given phonemic cues, 90% acc.    Long Term Goals By 8/1/2020   Express wants & needs using noun verb combinations &/or low tech communication board, indep  2. Comprehend moderately complex information presented verbally or which he read, about functional caily activities, SBA    Plan   Plan of Care Certification: 1/8/2020  to 4/1/2020  Recommended Treatment Plan:  Patient will participate in the [facility] neurological rehabilitation program for speech therapy 2 times per week to address his  Communication deficits, to educate patient and his family, and to participate in a home exercise program.    Other Recommendations: Evaluation by audiologist for amplification    Therapist's Name:   Claudia Chaudhry CCC-SLP/A     Date: 1/8/2020

## 2020-01-10 ENCOUNTER — CLINICAL SUPPORT (OUTPATIENT)
Dept: CARDIAC REHAB | Facility: HOSPITAL | Age: 75
End: 2020-01-10
Payer: MEDICARE

## 2020-01-10 DIAGNOSIS — I50.9 CHF (CONGESTIVE HEART FAILURE): ICD-10-CM

## 2020-01-10 PROCEDURE — 93798 PHYS/QHP OP CAR RHAB W/ECG: CPT

## 2020-01-13 ENCOUNTER — CLINICAL SUPPORT (OUTPATIENT)
Dept: CARDIAC REHAB | Facility: HOSPITAL | Age: 75
End: 2020-01-13
Payer: MEDICARE

## 2020-01-13 DIAGNOSIS — I50.9 CHF (CONGESTIVE HEART FAILURE): ICD-10-CM

## 2020-01-13 PROCEDURE — 93798 PHYS/QHP OP CAR RHAB W/ECG: CPT

## 2020-01-17 ENCOUNTER — CLINICAL SUPPORT (OUTPATIENT)
Dept: CARDIAC REHAB | Facility: HOSPITAL | Age: 75
End: 2020-01-17
Payer: MEDICARE

## 2020-01-17 DIAGNOSIS — I50.9 CHF (CONGESTIVE HEART FAILURE): ICD-10-CM

## 2020-01-17 PROCEDURE — 93798 PHYS/QHP OP CAR RHAB W/ECG: CPT

## 2020-01-20 ENCOUNTER — CLINICAL SUPPORT (OUTPATIENT)
Dept: CARDIAC REHAB | Facility: HOSPITAL | Age: 75
End: 2020-01-20
Payer: MEDICARE

## 2020-01-20 DIAGNOSIS — I50.9 CHF (CONGESTIVE HEART FAILURE): ICD-10-CM

## 2020-01-20 PROCEDURE — 93798 PHYS/QHP OP CAR RHAB W/ECG: CPT

## 2020-01-22 ENCOUNTER — CLINICAL SUPPORT (OUTPATIENT)
Dept: CARDIAC REHAB | Facility: HOSPITAL | Age: 75
End: 2020-01-22
Payer: MEDICARE

## 2020-01-22 DIAGNOSIS — I50.9 CHF (CONGESTIVE HEART FAILURE): ICD-10-CM

## 2020-01-22 PROCEDURE — 93798 PHYS/QHP OP CAR RHAB W/ECG: CPT

## 2020-01-24 ENCOUNTER — CLINICAL SUPPORT (OUTPATIENT)
Dept: CARDIAC REHAB | Facility: HOSPITAL | Age: 75
End: 2020-01-24
Payer: MEDICARE

## 2020-01-24 DIAGNOSIS — I50.9 CHF (CONGESTIVE HEART FAILURE): ICD-10-CM

## 2020-01-24 PROCEDURE — 93798 PHYS/QHP OP CAR RHAB W/ECG: CPT

## 2020-01-27 ENCOUNTER — CLINICAL SUPPORT (OUTPATIENT)
Dept: CARDIAC REHAB | Facility: HOSPITAL | Age: 75
End: 2020-01-27
Payer: MEDICARE

## 2020-01-27 DIAGNOSIS — I50.9 CHF (CONGESTIVE HEART FAILURE): ICD-10-CM

## 2020-01-27 PROCEDURE — 93798 PHYS/QHP OP CAR RHAB W/ECG: CPT

## 2020-01-29 ENCOUNTER — CLINICAL SUPPORT (OUTPATIENT)
Dept: CARDIAC REHAB | Facility: HOSPITAL | Age: 75
End: 2020-01-29
Payer: MEDICARE

## 2020-01-29 DIAGNOSIS — I50.9 CHF (CONGESTIVE HEART FAILURE): ICD-10-CM

## 2020-01-29 PROCEDURE — 93798 PHYS/QHP OP CAR RHAB W/ECG: CPT

## 2020-01-31 ENCOUNTER — CLINICAL SUPPORT (OUTPATIENT)
Dept: CARDIAC REHAB | Facility: HOSPITAL | Age: 75
End: 2020-01-31
Payer: MEDICARE

## 2020-01-31 DIAGNOSIS — I50.9 CHF (CONGESTIVE HEART FAILURE): ICD-10-CM

## 2020-01-31 PROCEDURE — 93798 PHYS/QHP OP CAR RHAB W/ECG: CPT

## 2020-02-03 ENCOUNTER — CLINICAL SUPPORT (OUTPATIENT)
Dept: CARDIAC REHAB | Facility: HOSPITAL | Age: 75
End: 2020-02-03
Payer: MEDICARE

## 2020-02-03 DIAGNOSIS — I50.9 CHF (CONGESTIVE HEART FAILURE): ICD-10-CM

## 2020-02-03 PROCEDURE — 93798 PHYS/QHP OP CAR RHAB W/ECG: CPT

## 2020-02-05 ENCOUNTER — CLINICAL SUPPORT (OUTPATIENT)
Dept: CARDIAC REHAB | Facility: HOSPITAL | Age: 75
End: 2020-02-05
Payer: MEDICARE

## 2020-02-05 DIAGNOSIS — I50.9 CHF (CONGESTIVE HEART FAILURE): ICD-10-CM

## 2020-02-05 PROCEDURE — 93798 PHYS/QHP OP CAR RHAB W/ECG: CPT

## 2020-02-07 ENCOUNTER — CLINICAL SUPPORT (OUTPATIENT)
Dept: CARDIAC REHAB | Facility: HOSPITAL | Age: 75
End: 2020-02-07
Payer: MEDICARE

## 2020-02-07 DIAGNOSIS — I50.9 CHF (CONGESTIVE HEART FAILURE): ICD-10-CM

## 2020-02-07 PROCEDURE — 93798 PHYS/QHP OP CAR RHAB W/ECG: CPT

## 2020-02-10 ENCOUNTER — CLINICAL SUPPORT (OUTPATIENT)
Dept: CARDIAC REHAB | Facility: HOSPITAL | Age: 75
End: 2020-02-10
Payer: MEDICARE

## 2020-02-10 DIAGNOSIS — I50.9 CHF (CONGESTIVE HEART FAILURE): ICD-10-CM

## 2020-02-10 PROCEDURE — 93798 PHYS/QHP OP CAR RHAB W/ECG: CPT

## 2020-02-12 ENCOUNTER — CLINICAL SUPPORT (OUTPATIENT)
Dept: CARDIAC REHAB | Facility: HOSPITAL | Age: 75
End: 2020-02-12
Payer: MEDICARE

## 2020-02-12 DIAGNOSIS — I50.9 CHF (CONGESTIVE HEART FAILURE): ICD-10-CM

## 2020-02-12 PROCEDURE — 93798 PHYS/QHP OP CAR RHAB W/ECG: CPT

## 2020-02-14 ENCOUNTER — CLINICAL SUPPORT (OUTPATIENT)
Dept: CARDIAC REHAB | Facility: HOSPITAL | Age: 75
End: 2020-02-14
Payer: MEDICARE

## 2020-02-14 DIAGNOSIS — I50.9 CHF (CONGESTIVE HEART FAILURE): ICD-10-CM

## 2020-02-14 PROCEDURE — 93798 PHYS/QHP OP CAR RHAB W/ECG: CPT

## 2020-02-17 ENCOUNTER — CLINICAL SUPPORT (OUTPATIENT)
Dept: CARDIAC REHAB | Facility: HOSPITAL | Age: 75
End: 2020-02-17
Payer: MEDICARE

## 2020-02-17 DIAGNOSIS — I50.9 CHF (CONGESTIVE HEART FAILURE): ICD-10-CM

## 2020-02-17 PROCEDURE — 93798 PHYS/QHP OP CAR RHAB W/ECG: CPT

## 2020-02-19 ENCOUNTER — CLINICAL SUPPORT (OUTPATIENT)
Dept: CARDIAC REHAB | Facility: HOSPITAL | Age: 75
End: 2020-02-19
Attending: INTERNAL MEDICINE
Payer: MEDICARE

## 2020-02-19 DIAGNOSIS — I50.9 CHF (CONGESTIVE HEART FAILURE): ICD-10-CM

## 2020-02-19 PROCEDURE — 93798 PHYS/QHP OP CAR RHAB W/ECG: CPT

## 2020-02-21 ENCOUNTER — CLINICAL SUPPORT (OUTPATIENT)
Dept: CARDIAC REHAB | Facility: HOSPITAL | Age: 75
End: 2020-02-21
Attending: INTERNAL MEDICINE
Payer: MEDICARE

## 2020-02-21 DIAGNOSIS — I50.9 CHF (CONGESTIVE HEART FAILURE): ICD-10-CM

## 2020-02-21 PROCEDURE — 93798 PHYS/QHP OP CAR RHAB W/ECG: CPT

## 2020-02-24 ENCOUNTER — CLINICAL SUPPORT (OUTPATIENT)
Dept: CARDIAC REHAB | Facility: HOSPITAL | Age: 75
End: 2020-02-24
Payer: MEDICARE

## 2020-02-24 DIAGNOSIS — I50.9 CHF (CONGESTIVE HEART FAILURE): ICD-10-CM

## 2020-02-24 PROCEDURE — 93798 PHYS/QHP OP CAR RHAB W/ECG: CPT

## 2020-02-26 ENCOUNTER — CLINICAL SUPPORT (OUTPATIENT)
Dept: CARDIAC REHAB | Facility: HOSPITAL | Age: 75
End: 2020-02-26
Payer: MEDICARE

## 2020-02-26 DIAGNOSIS — I50.9 CHF (CONGESTIVE HEART FAILURE): ICD-10-CM

## 2020-02-26 PROCEDURE — 93798 PHYS/QHP OP CAR RHAB W/ECG: CPT

## 2020-03-02 ENCOUNTER — CLINICAL SUPPORT (OUTPATIENT)
Dept: CARDIAC REHAB | Facility: HOSPITAL | Age: 75
End: 2020-03-02
Payer: MEDICARE

## 2020-03-02 DIAGNOSIS — I50.9 CHF (CONGESTIVE HEART FAILURE): ICD-10-CM

## 2020-03-02 PROCEDURE — 93798 PHYS/QHP OP CAR RHAB W/ECG: CPT

## 2020-03-04 ENCOUNTER — CLINICAL SUPPORT (OUTPATIENT)
Dept: CARDIAC REHAB | Facility: HOSPITAL | Age: 75
End: 2020-03-04
Payer: MEDICARE

## 2020-03-04 DIAGNOSIS — I50.9 CHF (CONGESTIVE HEART FAILURE): ICD-10-CM

## 2020-03-04 PROCEDURE — 93798 PHYS/QHP OP CAR RHAB W/ECG: CPT

## 2020-03-06 ENCOUNTER — CLINICAL SUPPORT (OUTPATIENT)
Dept: CARDIAC REHAB | Facility: HOSPITAL | Age: 75
End: 2020-03-06
Payer: MEDICARE

## 2020-03-06 DIAGNOSIS — I50.9 CHF (CONGESTIVE HEART FAILURE): ICD-10-CM

## 2020-03-06 PROCEDURE — 93798 PHYS/QHP OP CAR RHAB W/ECG: CPT

## 2020-03-06 NOTE — PROGRESS NOTES
Daily report scanned into EMR  Monitored by Carolina Barba, CEP  Monitored by Jerri Thompson, CEP

## 2020-03-09 ENCOUNTER — CLINICAL SUPPORT (OUTPATIENT)
Dept: CARDIAC REHAB | Facility: HOSPITAL | Age: 75
End: 2020-03-09
Payer: MEDICARE

## 2020-03-09 DIAGNOSIS — I50.9 CHF (CONGESTIVE HEART FAILURE): ICD-10-CM

## 2020-03-09 PROCEDURE — 93798 PHYS/QHP OP CAR RHAB W/ECG: CPT

## 2020-06-10 ENCOUNTER — CLINICAL SUPPORT (OUTPATIENT)
Dept: CARDIAC REHAB | Facility: HOSPITAL | Age: 75
End: 2020-06-10
Attending: INTERNAL MEDICINE
Payer: MEDICARE

## 2020-06-10 DIAGNOSIS — I50.9 CHF (CONGESTIVE HEART FAILURE): ICD-10-CM

## 2020-06-10 PROCEDURE — 93798 PHYS/QHP OP CAR RHAB W/ECG: CPT

## 2020-06-17 ENCOUNTER — CLINICAL SUPPORT (OUTPATIENT)
Dept: CARDIAC REHAB | Facility: HOSPITAL | Age: 75
End: 2020-06-17
Attending: INTERNAL MEDICINE
Payer: MEDICARE

## 2020-06-17 DIAGNOSIS — I50.9 CHF (CONGESTIVE HEART FAILURE): ICD-10-CM

## 2020-06-17 PROCEDURE — 93798 PHYS/QHP OP CAR RHAB W/ECG: CPT

## 2020-06-29 LAB
LEFT EYE DM RETINOPATHY: NEGATIVE
RIGHT EYE DM RETINOPATHY: NEGATIVE

## 2020-09-10 DIAGNOSIS — I66.02 OCCLUSION AND STENOSIS OF LEFT MIDDLE CEREBRAL ARTERY: Primary | ICD-10-CM

## 2020-09-22 ENCOUNTER — CLINICAL SUPPORT (OUTPATIENT)
Dept: REHABILITATION | Facility: HOSPITAL | Age: 75
End: 2020-09-22
Payer: MEDICARE

## 2020-09-22 DIAGNOSIS — R48.2 APRAXIA OF SPEECH: ICD-10-CM

## 2020-09-22 DIAGNOSIS — R47.01 APHASIA: Primary | ICD-10-CM

## 2020-09-22 PROCEDURE — 92523 SPEECH SOUND LANG COMPREHEN: CPT | Mod: PN

## 2020-09-22 NOTE — PROGRESS NOTES
See initial evaluation in Plan of Care.    Lisa Padilla M.A. CF-SLP  Speech Language Pathologist  9/24/2020

## 2020-09-24 PROBLEM — R48.2 APRAXIA OF SPEECH: Status: ACTIVE | Noted: 2020-09-24

## 2020-09-24 PROBLEM — R47.01 APHASIA: Status: ACTIVE | Noted: 2020-09-24

## 2020-09-24 NOTE — PLAN OF CARE
Outpatient Neurological Rehabilitation  Speech and Language Therapy Evaluation    Date: 9/22/2020     Name: Agapito Bass   MRN: [id number]    Therapy Diagnosis:   Encounter Diagnoses   Name Primary?   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   Speech Language Evaluation   45          Precautions:Standard and Language impairment  Subjective   Date of Onset: 6/7/2019  History of Current Condition:   Pt and pt's wife report he had a stroke on 6/7/2019. Chart review indicated occlusion and stenosis of left middle cerebral artery, which is consistent with the aphasia and apraxia of speech presentation today. Following the stroke, patient had significant dysphagia, resulting in placement of a PEG tube. Patient states he has not used the PEG in about a year. Pt was initially seen at [facility] outpatient speech therapy for September -October 2019. Pt reports consistent frustration throughout therapy, requiring a break. Patient has not received treatment since 10/23/2020. He was re-evaluated on 1/8/2020 with no follow up therapy.  Past Medical History: Agapito Bass  has no past medical history on file.  Agapito Bass  has no past surgical history on file.  Medical Hx and Allergies: Agapito currently has no medications in their medication list. Review of patient's allergies indicates:  No Known Allergies  Prior Therapy:  Patient received SLP services as stated above.  Social History:  Patient lives with his wife, retired , wife states he is beginning to withdraw due to communication deficits, wife drives and manages  "finances  Prior Level of Function: Independent   Current Level of Function: Dependent for communication  Pain:   0/10  Pain Location / Description: n/a  Nutrition:  Oral, Thin liquids (IDDSI 0) and Regular consistencies (IDDSI 7)  Patient's Therapy Goals:  "Get talking better."  Objective   Formal Assessment:  Western Aphasia Battery - Revised (WAB-R) was administered to evaluate the patient's receptive and expressive language function.The purpose stated in the manual for the WAB-R is to determine the presence, severity, and type of aphasia; measure the patient's level of performance to provide a baseline for detecting change over time; provide a comprehensive assessment of the patients language strengths and deficits in order to guide treatment and management; infer the location and etiology of the lesion causing the aphasia.  The following results were revealed:     Spontaneous Speech Score: 10   Auditory Comprehension Score: 6.55 / 10  Repetition Score:   5.4 /10  Naming and Word Finding Score: 4.8/ 10  Aphasia Quotient (AQ):   53.5 / 100  Aphasia Classification: Broca's aphasia    Subtest Results:   Information Content: 8 / 10  Fluency, Grammatical Competence, and Paraphasias: 2 /10  Yes / No Questions: 60 / 60  Auditory Word Recognition: 53 / 60  Sequential Commands: 18 / 80  Repetition: 54 /100  Object Namin /60  Word Fluency: 10  20  Sentence Completion: 4 /10  Responsive Speech: 0 / 10    Description: Spontaneous speech is c/b single words, often paraphasic, effortful, and hesitant, however, the content of his utterances is meaningful. He conveys information on few words. Auditory comprehension breaks down with complex 2 step commands. Repetition was limited by apraxia of speech.     There are no standardized test scores reported in the chart from previous evaluations to amee progress from treatment.       Description:  Language Skills: See results above.    Cognition: Cognition could not be fully " assessed secondary to moderate aphasia and apraxia of speech.    Motor Speech Skills: Acquired apraxia of speech noted.  His speech fluency is limited by his aphasia. He presents with a clear voice with adequate volume and pitch. No concerns at this time.    Hearing / Vision: Pt wears hearing aids and glasses.    [facility] National Outcome Measures System (NOMS):   Spoken Language Comprehension  LEVEL 4: The individual consistently responds accurately to simple yes/no questions andoccasionally follows simple directions without cues. Moderate contextual support isusually needed to understand complex sentences/messages. The individual is able tounderstand limited conversations about routine daily activities with familiarcommunication partners.  Spoken Language Expression  LEVEL 3 The communication partner must assume responsibility for structuring thecommunication exchange, and with consistent and moderate cueing, the individual canproduce words and phrases that are appropriate and meaningful in context.  Reading  LEVEL 4: The individual reads words and phrases related to routine daily activities, and words that are less familiar, longer, and more complex. The individualusually requires moderate cueing to read sentences of approximately 5to 7 words.  Writing   LEVEL 3: The individual writes single letters and common words, and with consistent moderate cueing, can write some words that are less familiar, longer, and more complex.    Treatment   Treatment Time In: n/a  Treatment Time Out: n/a  Total Treatment Time: n/a  no treatment performed 2/2 time to complete evaluation.    Education: Plan of Care, role of SLP in care, scheduling/ cancellation policy and insurance limitations / visit limit  were discussed with pt. Patient and family members expressed understanding.     Home Program: not yet established  Assessment     Agapito presents to [facility] s/p medical diagnosis of Occlusion and stenosis  of left middle cerebral artery.  Demonstrates impairments including limitations as described in the problem list.He presents with broca's aphasia c/b perseveration's, paraphasias, halting speech, and deficits in word finding. Acquired Apraxia of speech was also noted c/b imprecise articulation, articulatory groping, and stuttering like initiation. Positive prognostic factors include patient's desire to improve and patient's engagement/attitude in the session with clinician. Negative prognostic factors include time since onset.No barriers to therapy identified. Patient will benefit from skilled, outpatient neurological rehabilitation speech therapy.    Rehab Potential: good  Pt's spiritual, cultural and educational needs considered and pt agreeable to plan of care and goals.    Short Term Goals (8 weeks):   1. Pt will name 15 items in a concrete category in 60 seconds.  2. Pt will name 10 items in a abstract category in 60 seconds.  3. Pt will participate in the speech production treatment hierarchy with monosyllabic /m/  Words.    4. Pt will participate in the speech production treatment hierarchy with monosyllabic /b/  Words.  5. Pt will participate in the Combined Aphasia and Apraxia of Speech Treatment (CAAST) with 5 verbs per session to improve verbal fluency and speech intelligibility.   6. Pt will complete Semantic Feature Analysis for 12 nouns with 80% accuracy independently across 2 sessions per noun.   7. Pt will recall the spelling of 1 syllable words with 80% accuracy using Copy, Anagram, Recall Treatment (CART)  Protocol across 3 sessions.  8. Pt will match the name of an object to the object in a FO4 with 80% accuracy given moderate cues across 3 sessions.  9. Pt will follow complex 2 step commands given verbal and visual commands with 60% accuracy independently.  10. Pt will name objects with 80% accuracy given minimal cues across 3 sessions.    Long Term Goals (12 weeks):   1. He  will develop  functional cognitive-linguistic based skills and utilize compensatory strategies to communicate wants and needs effectively to different conversational partners, maintain safety, and participate socially in functional living environment.    2. Pt will comprehend communication related to basic medical and social needs and utilize compensatory strategies to maintain safety and to participate socially in functional living environment.    3. He  will develop appropriate writing skills related to routine daily activities and utilize compensatory strategies to communicate basic medical wants and needs in functional environment.   4. He  will develop functional reading skills and utilize compensatory strategies to maintain safety during ADL's and read and understand moderate adult material independently.      Plan     Plan of Care Certification Period: 9/22/2020  to 12/18/2020    Recommended Treatment Plan:  Patient will participate in the [facility] neurological rehabilitation program for speech therapy 2 times per week to address his  Communication and Motor Speech deficits, to educate patient and their family, and to participate in a home exercise program.    Therapist's Name:   BRENDAN Carlos-SLP   9/22/2020     Physician Name: _______________________________    Physician Signature: ____________________________

## 2020-09-24 NOTE — PATIENT INSTRUCTIONS
"Apraxia of Speech in Adults    What is apraxia of speech?    Apraxia of speech is a motor speech disorder. The messages from the brain to the mouth are disrupted, and the person cannot move his or her lips or tongue to the right place to say sounds correctly, even though the muscles are not weak. The severity of apraxia depends on the nature of the brain damage. Apraxia can occur in conjunction with dysarthria (muscle weakness affecting speech production) or aphasia (language difficulties related to neurological damage). Apraxia of speech is also known as acquired apraxia of speech, verbal apraxia, and dyspraxia.    Children can also have apraxia, referred to as childhood apraxia of speech.    What are some signs or symptoms of apraxia of speech?    Individuals with apraxia of speech know what words they want to say, but their brains have difficulty coordinating the muscle movements necessary to say all the sounds in the words. As a result, they may say something completely different or make up words (e.g., "bipem" or "chicken" for kitchen"). The person may recognize the error and try again--sometimes getting it right, but sometimes saying something else entirely. This situation can become quite frustrating for the person.    Individuals with apraxia may demonstrate:    difficulty imitating and producing speech sounds, marked by speech errors such as sound distortions, substitutions, and/or omissions;  inconsistent speech errors;  groping of the tongue and lips to make specific sounds and words;  slow speech rate;  impaired rhythm and prosody (intonation) of speech;  better automatic speech (e.g., greetings) than purposeful speech;  inability to produce any sound at all in severe cases.    What causes apraxia of speech?    Apraxia of speech is caused by damage to the parts of the brain that control coordinated muscle movement. A common cause of acquired apraxia is stroke. Other causes include traumatic brain " "injury, dementia, brain tumors, and progressive neurological disorders.    How common is apraxia of speech?    There is not a lot of research about how many people have apraxia of speech. Many times apraxia of speech occurs together with other communication disorders, such as aphasia.    How is apraxia of speech diagnosed?    A speech-language pathologist (SLP) uses a combination of formal and informal assessment tools to diagnose apraxia of speech and determine the nature and severity of the condition. The assessment typically includes examinations of the individuals oral-motor abilities, rosa of speech, and speech sound production in a variety of contexts.    What treatments are available to people with apraxia of speech?    An SLP can work with people with apraxia of speech to improve speech abilities and overall communication skills. The focus of intervention is on improving the planning, sequencing, and coordination of muscle movements for speech production. The muscles of speech often need to be "retrained" to produce sounds correctly and sequence sounds into words. Exercises are designed to allow the person to repeat sounds over and over and to practice correct mouth movements for sounds. The person with apraxia of speech may need to slow his or her speech rate or work on "pacing" speech so that he or she can produce all necessary sounds. In severe cases, augmentative and alternative communication may be necessary (e.g., the use of simple gestures or more sophisticated electronic equipment).    What other organizations have information about apraxia of speech?    This list is not exhaustive and inclusion does not imply endorsement of the organization or the content of the website by [employer].    [employer] [place] [employer] Neu[employer] and Stroke  [employer] [place] on Deafness and Other Communication Disorders    Reference: Apraxia of Speech. American [employer], [employer], " www.irma.org/public/speech/disorders/apraxia of speech/.    What to Expect When You're Not Expecting Aphasia    There are 2 millions people living aphasia.   25-40% of our country's 1 million annual stroke victims acquire aphasia.   While most recover, as many as 40% will have chronic aphasia.   Despite the frequency of the condition, a large number don't know they have aphasia or how to explain it.   As your partner for the aphasia journey, we've incorporated helpful resources for caregivers and loved ones who are new to aphasia.     What is Aphasia?  Aphasia is an acquired language disorder resulting from a stroke or brain  injury. It affects a persons ability to process, use, and understand language.  Aphasia can cause frustration and stress for an individual living with it, and also  for his or her caregiver. An aphasia diagnosis is unplanned, unexpected, and  frustrating, but its not hopeless.    MILD TO SEVERE APHASIA  The severity of aphasia depends on the degree and location of damage to the brain. The  greater the severity of the aphasia, the more limited the speech and language skills. Here is  what to expect with mild to severe aphasia:    Mild aphasia:   Individuals may have trouble understanding long messages   Individuals may need a little extra time to understand and  respond to spoken messages   Individuals may have difficulty finding words to express or explain an idea   Individuals may put words in the wrong order, or substitute the wrong  word/part of a word when talking, for example, he or she may call  a table a cup   Individuals may have difficulty responding to questions on the spot    Severe aphasia:   Individuals may have trouble understanding spoken utterances   Individuals may be unreliable in responding to yes and no questions   Individuals may not be aware of their own errors   Individuals may use a combination of words and jargon that is not  understood by others    "Individuals may have little or no speech     What is an SLP?  - a speech language pathologist is a highly-trained health care professional who evaluates and treats speech, language, and cognitive impairments as well as swallowing disorders.   - Although people often think of speech and language as the same thing, the terms actually have different meaning - speech is the physical act of making sounds to communicate; language is the cognitive ability to form letters and words together into a sentence or a phrase.     Types of Aphasia:  If your loved one has suffered a stroke and is experiencing some language deficiencies, you may hear your health care professional describe the language loss as "Broca's aphasia" or "Wernicke's aphasia".     To understand the type of aphasia your love one may be facing, visit with your SLP or neurologist. They may classify your loved one's language using one of the terms listed in the following pages:          Ask the Clinician:   Navigating an aphasia diagnosis is difficult and full of questions. [facility]'s clinical team answers some of the biggest question about the communication disorder:     Q: Who gets aphasia?   Aphasia is a communication disorder that occurs as a result of a stroke or a brain injury. Because stroke and brain injuries can happen to anyone at any time, so can aphasia. Aphasia also results from dementia and age-related brain atrophy.     Q: What does aphasia feel like?   Many individuals with aphasia report that it's like having a word on the tip of your tongue. Because the words are not processed as quickly as in an unimpaired individual, it's as if others are speaking very quickly. Many also say it can be frustrating and challenging to explain thoughts or feelings because the words can be scrambled or jumbled. It can be difficult to understand what individuals with aphasia are saying.     Q: My  had a stroke and many people are saying he will eventually " plateau in his recovery. Is this true?   One the most frustrating things for adults with aphasia is hearing from a health care  professional that theyve reached a plateau in their recovery. Often individuals first hear  about the plateau from their physicians who, immediately after the onset of aphasia, tell  families and loved ones what to expect. For years it was believed that any recovery would  happen within the first six months. While many individuals make a full recovery from the initial  symptoms of aphasia, as many as 40 percent develop chronic aphasia.  Research shows that adults with aphasia can continue to get better with ongoing therapy  and practice. By practicing reading, writing, speaking, and listening on a daily basis,  adults with aphasia can strengthen their skills. Determination and access to resources  are the most important factors to regaining language skills after a stroke or brain injury.    Q: I have aphasia and I have trouble speaking, but I can read and write. Is that normal?   Aphasia affects all modalities of language to some degree. Depending on the type and  severity of aphasia, it can affect speaking, listening, reading, and writing.     Q: Can you get better?   The outcome of aphasia is difficult to predict, given the inconsistency of its symptoms  and types. Generally, if you are younger or have less-extensive brain damage, your chances for improvement and recovery are better. In general, people with aphasia tend to recover their language processing skills more quickly than their speaking and writing skills. Recovery starts with lots of practice reading, writing, and speaking.         Continuing Your Aphasia Journey  Learning and understanding an aphasia diagnosis takes time. Whether you have  a language deficit or youre the parent or caregiver of someone with a language  deficit, we will help you find an effective solution to help you communicate. Our  clinical team is here  to guide and support you every step of the way    Improvement is possible:   There are many different treatment options for people with aphasia. Research shows that aphasia treatment should be initiated as soon as possible following a stroke. Whether your aphasia journey includes ongoing speech therapy sessions, intensive speech programs, or online activities, we are here to help you.     Practice, practice, practice:   Improvement starts with the help of a great SLP. In the [place], more than 16,000  SLPs treat adults with speech disorders, including aphasia. These professionals navigate the challenges of trying to understand someone with a language disorder and they can provide functional tips and techniques for caregivers to communicate with their loved one. Learning these new ways to facilitate functional communication from an SLP will help your loved one begin to face life with aphasia.  Additionally, tools like mobile device applications, speech-generating devices, online speech therapy, and support groups can help your loved one strengthen his or her communication skills and regain confidence. They can also help your loved one find meaningful and helpful ways to share his or her wants and needs, personal information, and safety information; including information about pain or symptoms of illness.    Copyright [employer] - www.aphasia.com

## 2020-10-06 ENCOUNTER — CLINICAL SUPPORT (OUTPATIENT)
Dept: REHABILITATION | Facility: HOSPITAL | Age: 75
End: 2020-10-06
Payer: MEDICARE

## 2020-10-06 DIAGNOSIS — R48.2 APRAXIA OF SPEECH: ICD-10-CM

## 2020-10-06 DIAGNOSIS — R47.01 APHASIA: ICD-10-CM

## 2020-10-06 PROCEDURE — 92507 TX SP LANG VOICE COMM INDIV: CPT | Mod: PN

## 2020-10-06 NOTE — PROGRESS NOTES
"Outpatient Neurological Rehabilitation   Speech and Language Therapy Treatment Note  Date:  10/6/2020     Name: Agapito Bass   MRN: [id number]   Therapy Diagnosis:   Encounter Diagnoses   Name Primary?   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   Response to previous treatment: N/a   Pain Scale:  0/10 on VAS currently.   Pain Location: n/a  Objective:         UNTIMED  Procedure Min.   Speech- Language- Voice Therapy  45   Total Timed Units: 0  Total Untimed Units: 1  Charges Billed/# of units: 1    Short Term Goals: (6 weeks) Current Progress:   Pt will name 15 items in a concrete category in 60 seconds.    Progressing/ Not Met 10/6/2020   Pt named 8 items in a 60 second window  Pt able to name 6 additional items given extended time. Perseveration noted.   Pt will name 10 items in a abstract category in 60 seconds.     Progressing/ Not Met 10/6/2020   Not addressed in today's session.     Pt will participate in the speech production treatment hierarchy with monosyllabic /m/  Words.      Progressing/ Not Met 10/6/2020   Not addressed in today's session.     Pt will participate in the speech production treatment hierarchy with monosyllabic /b/  Words.    Progressing/Not Met 10/6/2020  Not addressed in today's session.     Pt will participate in the Combined Aphasia and Apraxia of Speech Treatment (CAAST) " with 5 verbs per session to improve verbal fluency and speech intelligibility.      Progressing/ Not Met 10/6/2020   Not addressed in today's session.     Pt will complete Semantic Feature Analysis for 12 nouns with 80% accuracy independently across 2 sessions per noun.      Progressing/ Not Met 10/6/2020   Tissue: 60% accuracy independently, 100% accuracy with moderate cues  Glasses: 80% accuracy independently, 100% accuracy with minimal cues MET x1  Scissors: 40% accuracy independently, 100% accuracy with moderate cues      Pt will recall the spelling of 1 syllable words with 80% accuracy using Copy, Anagram, Recall Treatment (CART)  Protocol across 3 sessions.     Progressing/ Not Met 10/6/2020   Pt spelled 1 syllable words x5  Initial spelling without tiles- 40% accuracy  Recalled following CART with 80% accuracy independently      MET x1   Pt will follow complex 2 step commands given verbal and visual commands with 60% accuracy independently.     Progressing/ Not Met 10/6/2020   Pt followed 2 step commands (simple) given verbal and visual commands with 100% accuracy independently     Pt will name objects with 80% accuracy given minimal cues across 3 sessions.    Progressing/Not Met 10/6/2020  Naming objects x10 with 90% accuracy independently    Semantic and phonemic paraphasias noted throughout the task and session     Patient Education/Response:   Pt educated on communication aids and supplementation    Written Home Exercises Provided: yes.  Exercises were reviewed and Agapito was able to demonstrate them prior to the end of the session.  Agapito demonstrated good  understanding of the education provided.     See EMR under Patient Instructions for exercises provided prior visit.  Assessment:   Agapito is progressing well towards his goals. Pt with sematic and phonemic paraphasias throughout the session. Patient benefits from extended time. Current goals remain appropriate. Goals to be updated as necessary.     Pt  prognosis is Good. Pt will continue to benefit from skilled outpatient speech and language therapy to address the deficits listed in the problem list on initial evaluation, provide pt/family education and to maximize pt's level of independence in the home and community environment.   Medical necessity is demonstrated by the following IMPAIRMENTS:  decreased content words and significant word finding difficulty in all situations severely limiting functional communication with both familiar and unfamiliar communication partners to relay medically and safety relevant information in a timely manner in a state of emergency.   Barriers to Therapy: None Identified  Pt's spiritual, cultural and educational needs considered and pt agreeable to plan of care and goals.  Plan:   Continue POC with focus on rehabilitation and compensation for 4 domains of language.    BRENDAN Carlos-SLP   10/6/2020

## 2020-10-08 ENCOUNTER — CLINICAL SUPPORT (OUTPATIENT)
Dept: REHABILITATION | Facility: HOSPITAL | Age: 75
End: 2020-10-08
Payer: MEDICARE

## 2020-10-08 DIAGNOSIS — R47.01 APHASIA: ICD-10-CM

## 2020-10-08 DIAGNOSIS — R48.2 APRAXIA OF SPEECH: ICD-10-CM

## 2020-10-08 PROCEDURE — 92507 TX SP LANG VOICE COMM INDIV: CPT | Mod: PN

## 2020-10-08 NOTE — PROGRESS NOTES
"Outpatient Neurological Rehabilitation   Speech and Language Therapy Treatment Note  Date:  10/8/2020     Name: Agapito Bass   MRN: [id number]   Therapy Diagnosis:   Encounter Diagnoses   Name Primary?   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  Response to previous treatment: "I liked the challenge."  Pain Scale:  0/10 on VAS currently.   Pain Location: n/a  Objective:         UNTIMED  Procedure Min.   Speech- Language- Voice Therapy  45   Total Timed Units: 0  Total Untimed Units: 1  Charges Billed/# of units: 1    Short Term Goals: (6 weeks) Current Progress:   Pt will name 15 items in a concrete category in 60 seconds.    Progressing/ Not Met 10/8/2020   Not addressed in today's session.    Pt will name 10 items in a abstract category in 60 seconds.     Progressing/ Not Met 10/8/2020   Not addressed in today's session.     Pt will participate in the speech production treatment hierarchy with monosyllabic /m/  Words.      Progressing/ Not Met 10/8/2020   Not addressed in today's session.     Pt will participate in the speech production treatment hierarchy with monosyllabic /b/  Words.    Progressing/Not Met 10/8/2020  Not addressed in today's session.     Pt will participate in the Combined Aphasia and Apraxia of Speech Treatment (CAAST) with 5 verbs per session to improve verbal fluency " and speech intelligibility.      Progressing/ Not Met 10/8/2020   Not addressed in today's session.     Pt will complete Semantic Feature Analysis for 12 nouns with 80% accuracy independently across 2 sessions per noun.      Progressing/ Not Met 10/8/2020   Tissue: 80% accuracy independently, 100% accuracy with minimal cues Met x1  Glasses: 80% accuracy independently, 100% accuracy with minimal cues MET x2  Scissors: 60% accuracy independently, 100% accuracy with moderate cues  Phone: 80% accuracy independently, 100% accuracy with minimal cues MET x1    Pt supplements with gestures and writing      Words Met:   glasses      Pt will recall the spelling of 1 syllable words with 80% accuracy using Copy, Anagram, Recall Treatment (CART)  Protocol across 3 sessions.     Progressing/ Not Met 10/8/2020   Not addressed in today's session.     MET x1   Pt will follow complex 2 step commands given verbal and visual commands with 60% accuracy independently.     Progressing/ Not Met 10/8/2020   Pt followed 2 step commands (complex) given verbal and visual commands with 80% accuracy independently    Met x1   Pt will name objects with 80% accuracy given minimal cues across 3 sessions.    Progressing/Not Met 10/8/2020  Naming objects x10 with 90% accuracy independently    Semantic and phonemic paraphasias noted throughout the task and session    Met x2     Patient Education/Response:   Pt educated on: Aphasia card and usage, AAC options    Written Home Exercises Provided: yes.  Exercises were reviewed and Agapito was able to demonstrate them prior to the end of the session.  Agapito demonstrated good  understanding of the education provided.     See EMR under Patient Instructions for exercises provided prior visit.  Assessment:   Agapito is progressing well towards his goals. Pt with increased independence with communication strategies in conversation. Aphasia card given. Boogie board (low tech AAC) discussed with family. Current goals  remain appropriate. Goals to be updated as necessary.     Pt prognosis is Good. Pt will continue to benefit from skilled outpatient speech and language therapy to address the deficits listed in the problem list on initial evaluation, provide pt/family education and to maximize pt's level of independence in the home and community environment.   Medical necessity is demonstrated by the following IMPAIRMENTS:  decreased content words and significant word finding difficulty in all situations severely limiting functional communication with both familiar and unfamiliar communication partners to relay medically and safety relevant information in a timely manner in a state of emergency.   Barriers to Therapy: None Identified  Pt's spiritual, cultural and educational needs considered and pt agreeable to plan of care and goals.  Plan:   Continue POC with focus on rehabilitation and compensation for 4 domains of language.    BRENDAN Carlos-SLP   10/8/2020

## 2020-10-13 ENCOUNTER — CLINICAL SUPPORT (OUTPATIENT)
Dept: REHABILITATION | Facility: HOSPITAL | Age: 75
End: 2020-10-13
Payer: MEDICARE

## 2020-10-13 DIAGNOSIS — R48.2 APRAXIA OF SPEECH: ICD-10-CM

## 2020-10-13 DIAGNOSIS — R47.01 APHASIA: ICD-10-CM

## 2020-10-13 PROCEDURE — 92507 TX SP LANG VOICE COMM INDIV: CPT | Mod: PN

## 2020-10-13 NOTE — PROGRESS NOTES
"Outpatient Neurological Rehabilitation   Speech and Language Therapy Treatment Note  Date:  10/13/2020     Name: Agapito Bass   MRN: [id number]   Therapy Diagnosis:   Encounter Diagnoses   Name Primary?   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  Response to previous treatment: "This is going good."  Pain Scale:  0/10 on VAS currently.   Pain Location: n/a  Objective:         UNTIMED  Procedure Min.   Speech- Language- Voice Therapy  45   Total Timed Units: 0  Total Untimed Units: 1  Charges Billed/# of units: 1    Short Term Goals: (6 weeks) Current Progress:   Pt will name 15 items in a concrete category in 60 seconds.    Progressing/ Not Met 10/13/2020   Pt names 10 items in a concrete category in 60 seconds,  Pt named 10 additional item given extended time   Pt will name 10 items in a abstract category in 60 seconds.     Progressing/ Not Met 10/13/2020   Pt names 6 items in a abstract category in 60 seconds,  Pt named 5 additional item given extended time   Pt will participate in the speech production treatment hierarchy with monosyllabic /m/  Words.      Progressing/ Not Met 10/13/2020   Not addressed in today's session.     Pt will participate in the speech production treatment hierarchy with monosyllabic /b/  Words.    Progressing/Not Met 10/13/2020  Not addressed in today's session.     Pt " will participate in the Combined Aphasia and Apraxia of Speech Treatment (CAAST) with 5 verbs per session to improve verbal fluency and speech intelligibility.      Progressing/ Not Met 10/13/2020   Not addressed in today's session.     Pt will complete Semantic Feature Analysis for 12 nouns with 80% accuracy independently across 2 sessions per noun.      Progressing/ Not Met 10/13/2020   Not addressed in today's session.     Previous data:  Tissue: 80% accuracy independently, 100% accuracy with minimal cues Met x1  Scissors: 60% accuracy independently, 100% accuracy with moderate cues  Phone: 80% accuracy independently, 100% accuracy with minimal cues MET x1    Pt supplements with gestures and writing      Words Met:   glasses      Pt will recall the spelling of 1 syllable words with 80% accuracy using Copy, Anagram, Recall Treatment (CART)  Protocol across 3 sessions.     Progressing/ Not Met 10/13/2020   Pt spelled 1 syllable words with 43% accuracy independently, and 85 % accuracy given moderate cues.    Pt recalled the spelling of 1 syllable words with 50% accuracy.    MET x1   Pt will follow complex 2 step commands given verbal and visual commands with 60% accuracy independently.     Progressing/ Not Met 10/13/2020   Pt followed 2 step commands (complex) given verbal and visual commands with 70% accuracy independently    Met x2   Pt will name objects with 80% accuracy given minimal cues across 3 sessions.    Goal Met 10/13/2020  Naming objects x10 with 80% accuracy independently, 100% given minimal cues  Semantic and phonemic paraphasias noted throughout the task and session    Goal Met- 10/13/2020      Patient Education/Response:   Pt educated on: strategies for conversation in groups    Written Home Exercises Provided: yes.  Exercises were reviewed and Agapito was able to demonstrate them prior to the end of the session.  Agapito demonstrated good  understanding of the education provided.     See EMR under Patient  Instructions for exercises provided prior visit.  Assessment:   Agapito is progressing well towards his goals. Pt with increased independence with communication strategies in conversation. Naming goal met. Pt continues to have difficulty in spelling limiting writing to supplement verbal communication. Current goals remain appropriate. Goals to be updated as necessary.     Pt prognosis is Good. Pt will continue to benefit from skilled outpatient speech and language therapy to address the deficits listed in the problem list on initial evaluation, provide pt/family education and to maximize pt's level of independence in the home and community environment.   Medical necessity is demonstrated by the following IMPAIRMENTS:  decreased content words and significant word finding difficulty in all situations severely limiting functional communication with both familiar and unfamiliar communication partners to relay medically and safety relevant information in a timely manner in a state of emergency.   Barriers to Therapy: None Identified  Pt's spiritual, cultural and educational needs considered and pt agreeable to plan of care and goals.  Plan:   Continue POC with focus on rehabilitation and compensation for 4 domains of language.    BRENDAN Carlos-SLP   10/13/2020

## 2020-10-16 ENCOUNTER — CLINICAL SUPPORT (OUTPATIENT)
Dept: REHABILITATION | Facility: HOSPITAL | Age: 75
End: 2020-10-16
Payer: MEDICARE

## 2020-10-16 DIAGNOSIS — R48.2 APRAXIA OF SPEECH: ICD-10-CM

## 2020-10-16 DIAGNOSIS — R47.01 APHASIA: ICD-10-CM

## 2020-10-16 PROCEDURE — 92507 TX SP LANG VOICE COMM INDIV: CPT | Mod: PN

## 2020-10-16 NOTE — PATIENT INSTRUCTIONS
"Word Retrieval Strategies:   · Visualization: try to see the thing in your head. Concentrate on the details of the picture and sometimes the word will come  · Association:  Think of things that go with the word. For example, bread goes with butter, so if you are trying to think of the word "butter", you may think of the word "bread".  · Gesture: use the hand motion you would use with the thing you are thinking of. For example, if you are thinking of the word "wash", you might make a motion as if you are washing your hands.   · Description:  Describe the thing to the other person you are talking to. Even if the word does not come to you, the other person may be able to guess what you are trying to say.   · First Letter or Sound:  Try to think of the first letter of the word. Sometimes you can think of the first letter even if you cannot think of the word. The letter may "lead" you to the word. You might even try going down the alphabet to find the first letter.    "

## 2020-10-16 NOTE — PROGRESS NOTES
"Outpatient Neurological Rehabilitation   Speech and Language Therapy Treatment Note  Date:  10/16/2020     Name: Agapito Bass   MRN: [id number]   Therapy Diagnosis:   Encounter Diagnoses   Name Primary?   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  Response to previous treatment: "I'm talking more at home."  Pain Scale:  0/10 on VAS currently.   Pain Location: n/a  Objective:         UNTIMED  Procedure Min.   Speech- Language- Voice Therapy  45   Total Timed Units: 0  Total Untimed Units: 1  Charges Billed/# of units: 1    Short Term Goals: (6 weeks) Current Progress:   Pt will name 15 items in a concrete category in 60 seconds.    Progressing/ Not Met 10/16/2020   Pt names 11 items in a concrete category in 60 seconds,  Pt named 4 additional item given an extra 15 seconds   Pt will name 10 items in a abstract category in 60 seconds.     Progressing/ Not Met 10/16/2020   Pt names 5 items in a abstract category in 60 seconds,  Pt named 5 additional item given extended time   Pt will participate in the speech production treatment hierarchy with monosyllabic /m/  Words.      Progressing/ Not Met 10/16/2020   Not addressed in today's session.     Pt will participate in the speech production treatment hierarchy with monosyllabic /b/  Words.    Progressing/Not Met 10/16/2020  Not addressed in today's " session.     Pt will participate in the Combined Aphasia and Apraxia of Speech Treatment (CAAST) with 5 verbs per session to improve verbal fluency and speech intelligibility.      Progressing/ Not Met 10/16/2020   Not addressed in today's session.     Pt will complete Semantic Feature Analysis for 12 nouns with 80% accuracy independently across 2 sessions per noun.      Progressing/ Not Met 10/16/2020   Tissue: 80% accuracy independently, 100% accuracy with minimal cues Met x2  Scissors: 60% accuracy independently, 100% accuracy with moderate cues  Phone: 80% accuracy independently, 100% accuracy with minimal cues MET x2  Keys: 80% accuracy independently, 100% accuracy with minimal cues MET x1  Ball: 60% accuracy independently, 100% accuracy with moderate cues    Pt supplements with gestures and writing  Extended time required    Words Met:   Glasses, tissue, phone      Pt will recall the spelling of 1 syllable words with 80% accuracy using Copy, Anagram, Recall Treatment (CART)  Protocol across 3 sessions.     Progressing/ Not Met 10/16/2020   Pt spelled 1 syllable words with 60% accuracy independently, and 90% accuracy given moderate cues.    Pt recalled the spelling of 1 syllable words with 75% accuracy.    MET x1   Pt will follow complex 2 step commands given verbal and visual commands with 60% accuracy independently.     Goal Met 10/16/2020   Pt followed 2 step commands (complex) given verbal and visual commands with 100% accuracy independently    Goal Met- 10/16/2020    Pt will name objects with 80% accuracy given minimal cues across 3 sessions.    Goal Met 10/13/2020  [Goal Met- 10/13/2020    GOAL ADDED: Pt will name the category when given 3 words with 90% accuracy independently to improve word finding.    Progressing/Not Met 10/16/2020  Pt named the category when given 3 words with 60% accuracy independently.    GOAL ADDED: Pt will name the item when given 3 descriptors with 90% accuracy independently to  improve word finding.    Progressing/Not Met 10/16/2020  Pt named the item when given 3 descriptors with 60% accuracy independently.      Patient Education/Response:   Pt educated on: word retrieval strategies    Written Home Exercises Provided: yes.  Exercises were reviewed and Agapito was able to demonstrate them prior to the end of the session.  Agapito demonstrated good  understanding of the education provided.     See EMR under Patient Instructions for exercises provided 10/16/2020.  Assessment:   Agapito is progressing well towards his goals. Pt with increased accuracy with use of writing and gestures to supplement oral communication. Pt with decreased frustration as compared to previous sessions. Pt's spelling continues to demonstrate deficits mainly with vowels (especially long vowels); however, improvement noted in recognition of wrong spelling. Current goals remain appropriate. Goals to be updated as necessary.     Pt prognosis is Good. Pt will continue to benefit from skilled outpatient speech and language therapy to address the deficits listed in the problem list on initial evaluation, provide pt/family education and to maximize pt's level of independence in the home and community environment.   Medical necessity is demonstrated by the following IMPAIRMENTS:  decreased content words and significant word finding difficulty in all situations severely limiting functional communication with both familiar and unfamiliar communication partners to relay medically and safety relevant information in a timely manner in a state of emergency.   Barriers to Therapy: None Identified  Pt's spiritual, cultural and educational needs considered and pt agreeable to plan of care and goals.  Plan:   Continue POC with focus on rehabilitation and compensation for 4 domains of language.    BRENDAN Carlos-SLP   10/16/2020

## 2020-10-20 ENCOUNTER — CLINICAL SUPPORT (OUTPATIENT)
Dept: REHABILITATION | Facility: HOSPITAL | Age: 75
End: 2020-10-20
Payer: MEDICARE

## 2020-10-20 DIAGNOSIS — R48.2 APRAXIA OF SPEECH: ICD-10-CM

## 2020-10-20 DIAGNOSIS — R47.01 APHASIA: ICD-10-CM

## 2020-10-20 PROCEDURE — 92507 TX SP LANG VOICE COMM INDIV: CPT | Mod: PN

## 2020-10-20 NOTE — PROGRESS NOTES
"Outpatient Neurological Rehabilitation   Speech and Language Therapy Treatment Note  Progress Note  Date:  10/20/2020     Name: Agapito Bass   MRN: [id number]   Therapy Diagnosis:   Encounter Diagnoses   Name Primary?   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  Response to previous treatment: "Good, You are easy to talk to."  Pain Scale:  0/10 on VAS currently.   Pain Location: n/a  Objective:         UNTIMED  Procedure Min.   Speech- Language- Voice Therapy  45   Total Timed Units: 0  Total Untimed Units: 1  Charges Billed/# of units: 1    Short Term Goals: (6 weeks) Current Progress:   Pt will name 15 items in a concrete category in 60 seconds.    Progressing/ Not Met 10/20/2020   Pt names 9 items in a concrete category in 60 seconds,  Pt named 56 additional item given an extra 50 seconds   Pt will name 10 items in a abstract category in 60 seconds.     Progressing/ Not Met 10/20/2020   Pt names 5 items in a abstract category in 60 seconds,  Pt named 5 additional item given an extra 45 seconds   Pt will participate in the speech production treatment hierarchy with monosyllabic /m/  Words.      Progressing/ Not Met 10/20/2020   Not addressed in today's session.     Pt will participate in the speech production treatment hierarchy with monosyllabic /b/  Words.    Progressing/Not Met 10/20/2020  " Not addressed in today's session.     Pt will participate in the Combined Aphasia and Apraxia of Speech Treatment (CAAST) with 5 verbs per session to improve verbal fluency and speech intelligibility.      Progressing/ Not Met 10/20/2020   Not addressed in today's session.     Pt will complete Semantic Feature Analysis for 12 nouns with 80% accuracy independently across 2 sessions per noun.      Progressing/ Not Met 10/20/2020   Scissors: 80% accuracy independently, 100% accuracy with minimal cues Met x1  Keys: 80% accuracy independently, 100% accuracy with minimal cues MET x2  Ball: 100% accuracy independently Met x1  Pencil: 80% accuracy independently, 100% accuracy with minimal cues Met x1  Calculator: 60% accuracy independently, 100% accuracy with moderate cues    Pt supplements with gestures and writing  Extended time required    Words Met:   Glasses, tissue, phone, Keys      Pt will recall the spelling of 1 syllable words with 80% accuracy using Copy, Anagram, Recall Treatment (CART)  Protocol across 3 sessions.     Progressing/ Not Met 10/20/2020   Pt spelled 1 syllable words with 80% accuracy independently, and 100% accuracy given minimal cues.    Pt recalled the spelling of 1 syllable words with 50% accuracy.    MET x1   Pt will follow complex 2 step commands given verbal and visual commands with 60% accuracy independently.     Goal Met 10/16/2020   Goal Met- 10/16/2020    Pt will name objects with 80% accuracy given minimal cues across 3 sessions.    Goal Met 10/13/2020  Goal Met- 10/13/2020    Pt will name the category when given 3 words with 90% accuracy independently to improve word finding.    Progressing/Not Met 10/20/2020  Pt named the category when given 3 words with 75% accuracy independently.     Pt will name the item when given 3 descriptors with 90% accuracy independently to improve word finding.    Progressing/Not Met 10/20/2020  Pt named the item when given 3 descriptors with 50% accuracy  independently, 100% given moderate cues     Patient Education/Response:   Pt educated on: difficulty with numbers as they are a part of language     Written Home Exercises Provided: yes.  Exercises were reviewed and Agapito was able to demonstrate them prior to the end of the session.  Agapito demonstrated good  understanding of the education provided.     See EMR under Patient Instructions for exercises provided 10/16/2020.  Assessment:   Progress Note:  Over the last 30 days, the pt has demonstrated increased accuracy with use of writing and gestures to supplement oral communication. Single word spelling has drastically improved, which has carried over to functional supplementation of oral communication. Pt currently attempts to communicate more (quanity wise) and more difficult topics (quality of his communication).  Pt continues to display deficits which warrant skilled ST services.    Agapito is progressing well towards his goals. Current goals remain appropriate. Goals to be updated as necessary.     Pt prognosis is Good. Pt will continue to benefit from skilled outpatient speech and language therapy to address the deficits listed in the problem list on initial evaluation, provide pt/family education and to maximize pt's level of independence in the home and community environment.   Medical necessity is demonstrated by the following IMPAIRMENTS:  decreased content words and significant word finding difficulty in all situations severely limiting functional communication with both familiar and unfamiliar communication partners to relay medically and safety relevant information in a timely manner in a state of emergency.   Barriers to Therapy: None Identified  Pt's spiritual, cultural and educational needs considered and pt agreeable to plan of care and goals.  Plan:   Continue POC with focus on rehabilitation and compensation for 4 domains of language.    BRENDAN Carlos-SLP   10/20/2020

## 2020-10-23 ENCOUNTER — CLINICAL SUPPORT (OUTPATIENT)
Dept: REHABILITATION | Facility: HOSPITAL | Age: 75
End: 2020-10-23
Payer: MEDICARE

## 2020-10-23 DIAGNOSIS — R47.01 APHASIA: ICD-10-CM

## 2020-10-23 DIAGNOSIS — R48.2 APRAXIA OF SPEECH: ICD-10-CM

## 2020-10-23 PROCEDURE — 92507 TX SP LANG VOICE COMM INDIV: CPT | Mod: PN

## 2020-10-23 NOTE — PROGRESS NOTES
"Outpatient Neurological Rehabilitation   Speech and Language Therapy Treatment Note    Date:  10/23/2020     Name: Agapito Bass   MRN: [id number]   Therapy Diagnosis:   Encounter Diagnoses   Name Primary?   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  Response to previous treatment: "Its getting easier to talk."  Pain Scale:  0/10 on VAS currently.   Pain Location: n/a  Objective:         UNTIMED  Procedure Min.   Speech- Language- Voice Therapy  45   Total Timed Units: 0  Total Untimed Units: 1  Charges Billed/# of units: 1    Short Term Goals: (6 weeks) Current Progress:   Pt will name 15 items in a concrete category in 60 seconds.    Progressing/ Not Met 10/23/2020   Pt names 13 items in a concrete category in 60 seconds   Pt will name 10 items in a abstract category in 60 seconds.     Progressing/ Not Met 10/23/2020   Pt names 7 items in a abstract category in 60 seconds   Pt will participate in the speech production treatment hierarchy with monosyllabic /m/  Words.      Progressing/ Not Met 10/23/2020   Not addressed in today's session.     Pt will participate in the speech production treatment hierarchy with monosyllabic /b/  Words.    Progressing/Not Met 10/23/2020  Not addressed in today's session.     Pt will participate in the Combined Aphasia and Apraxia of Speech Treatment (CAAST) with " 5 verbs per session to improve verbal fluency and speech intelligibility.      Progressing/ Not Met 10/23/2020   Not addressed in today's session.     Pt will complete Semantic Feature Analysis for 12 nouns with 80% accuracy independently across 2 sessions per noun.      Progressing/ Not Met 10/23/2020   Scissors: 100% accuracy independently Met x2  Ball: 80% accuracy independently, 100% accuracy given minimal cues Met x2  Pencil: 60% accuracy independently, 100% accuracy with moderate cues Met x1  Calculator: 80% accuracy independently, 100% accuracy with minimal cues Met x1    Pt supplements with gestures and writing  Extended time required    Words Met:   Glasses, tissue, phone, Keys      Pt will recall the spelling of 1 syllable words with 80% accuracy using Copy, Anagram, Recall Treatment (CART)  Protocol across 3 sessions.     Progressing/ Not Met 10/23/2020   Pt spelled 1 syllable words with 50% accuracy independently, and 100% accuracy given moderate cues.    Pt recalled the spelling of 1 syllable words with 80% accuracy independently      MET x2   Pt will follow complex 2 step commands given verbal and visual commands with 60% accuracy independently.     Goal Met 10/16/2020   Goal Met- 10/16/2020    Pt will name objects with 80% accuracy given minimal cues across 3 sessions.    Goal Met 10/13/2020  Goal Met- 10/13/2020    Pt will name the category when given 3 words with 90% accuracy independently to improve word finding.    Progressing/Not Met 10/23/2020  Pt named the category when given 3 words with 80% accuracy independently.     Pt will name the item when given 3 descriptors with 90% accuracy independently to improve word finding.    Progressing/Not Met 10/23/2020  Pt named the item when given 3 descriptors with 80% accuracy independently, 100% given minimal cues     Patient Education/Response:   Pt educated on: strategies for ordering meals at resturants    Written Home Exercises Provided: Patient  instructed to cont prior HEP.  Exercises were reviewed and Agapito was able to demonstrate them prior to the end of the session.  Agapito demonstrated good  understanding of the education provided.     See EMR under Patient Instructions for exercises provided 10/16/2020.  Assessment:   Agapito is progressing well towards his goals. Increased word fluency and convergent naming tasks. Pt with increased functional language. Current goals remain appropriate. Goals to be updated as necessary.     Pt prognosis is Good. Pt will continue to benefit from skilled outpatient speech and language therapy to address the deficits listed in the problem list on initial evaluation, provide pt/family education and to maximize pt's level of independence in the home and community environment.   Medical necessity is demonstrated by the following IMPAIRMENTS:  decreased content words and significant word finding difficulty in all situations severely limiting functional communication with both familiar and unfamiliar communication partners to relay medically and safety relevant information in a timely manner in a state of emergency.   Barriers to Therapy: None Identified  Pt's spiritual, cultural and educational needs considered and pt agreeable to plan of care and goals.  Plan:   Continue POC with focus on rehabilitation and compensation for 4 domains of language.    BRENDAN Carlos-SLP   10/23/2020

## 2020-10-27 ENCOUNTER — CLINICAL SUPPORT (OUTPATIENT)
Dept: REHABILITATION | Facility: HOSPITAL | Age: 75
End: 2020-10-27
Payer: MEDICARE

## 2020-10-27 DIAGNOSIS — R48.2 APRAXIA OF SPEECH: ICD-10-CM

## 2020-10-27 DIAGNOSIS — R47.01 APHASIA: ICD-10-CM

## 2020-10-27 PROCEDURE — 92507 TX SP LANG VOICE COMM INDIV: CPT | Mod: PN

## 2020-10-27 NOTE — PROGRESS NOTES
"Outpatient Neurological Rehabilitation   Speech and Language Therapy Treatment Note    Date:  10/27/2020     Name: Agapito Bass   MRN: [id number]   Therapy Diagnosis:   Encounter Diagnoses   Name Primary?   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  Response to previous treatment: pt utilized strategies of writing down name and date of birth to check in at the   Pain Scale:  0/10 on VAS currently.   Pain Location: n/a  Objective:         UNTIMED  Procedure Min.   Speech- Language- Voice Therapy  38   Total Timed Units: 0  Total Untimed Units: 1  Charges Billed/# of units: 1    Short Term Goals: (6 weeks) Current Progress:   Pt will name 15 items in a concrete category in 60 seconds.    Progressing/ Not Met 10/27/2020   Pt names 10 items in a concrete category in 60 seconds  15 given additional time   Pt will name 10 items in a abstract category in 60 seconds.     Progressing/ Not Met 10/27/2020   Pt names 6 items in a abstract category in 60 seconds  10 given additional time   Pt will participate in the speech production treatment hierarchy with monosyllabic /m/  Words.      Progressing/ Not Met 10/27/2020   Not addressed in today's session.     Pt will participate in the speech production treatment hierarchy with monosyllabic /b/  Words.    Progressing/Not Met 10/27/2020  Not " addressed in today's session.     Pt will participate in the Combined Aphasia and Apraxia of Speech Treatment (CAAST) with 5 verbs per session to improve verbal fluency and speech intelligibility.      Progressing/ Not Met 10/27/2020   Not addressed in today's session.     Pt will complete Semantic Feature Analysis for 12 nouns with 80% accuracy independently across 2 sessions per noun.      Progressing/ Not Met 10/27/2020   Pencil: 100% accuracy independently Met x2  Calculator: 100% accuracy independently Met x2  Bandage: 80% accuracy independently, 100% given minimal cues   : 60% accuracy independently, 100% given moderate cues     Pt supplements with gestures and writing  Extended time required    Words Met:   Glasses, tissue, phone, keys, ball, scissors, Pencil, calculator      Pt will recall the spelling of 1 syllable words with 80% accuracy using Copy, Anagram, Recall Treatment (CART)  Protocol across 3 sessions.     Progressing/ Not Met 10/27/2020   Pt spelled 1 syllable words with 55% accuracy independently, and 100% accuracy given moderate cues.    Pt recalled the spelling of 1 syllable words with 75% accuracy independently      MET x2   Pt will follow complex 2 step commands given verbal and visual commands with 60% accuracy independently.     Goal Met 10/16/2020   Goal Met- 10/16/2020    Pt will name objects with 80% accuracy given minimal cues across 3 sessions.    Goal Met 10/13/2020  Goal Met- 10/13/2020    Pt will name the category when given 3 words with 90% accuracy independently to improve word finding.    Progressing/Not Met 10/27/2020  Not addressed in today's session.     Pt will name the item when given 3 descriptors with 90% accuracy independently to improve word finding.    Progressing/Not Met 10/27/2020  Not addressed in today's session.    GOAL ADDED: Pt will read a moderate paragraph and answer the multiple choice questions that follow with 90% accuracy  independently.    Progressing/Not Met 10/27/2020  Pt read a simple paragraph with 90% accuracy independently.     Patient Education/Response:   Pt educated on: strategies written communication    Written Home Exercises Provided: Patient instructed to cont prior HEP.  Exercises were reviewed and Agapito was able to demonstrate them prior to the end of the session.  Agapito demonstrated good  understanding of the education provided.     See EMR under Patient Instructions for exercises provided 10/16/2020.  Assessment:   Agapito is progressing well towards his goals. Pt with increased functional communication and naming of 3 syllable word approximations. Pt with increased use of writing for self-cue and functional communication. Spelling with continued improvement. Current goals remain appropriate. Goals to be updated as necessary.     Pt prognosis is Good. Pt will continue to benefit from skilled outpatient speech and language therapy to address the deficits listed in the problem list on initial evaluation, provide pt/family education and to maximize pt's level of independence in the home and community environment.   Medical necessity is demonstrated by the following IMPAIRMENTS:  decreased content words and significant word finding difficulty in all situations severely limiting functional communication with both familiar and unfamiliar communication partners to relay medically and safety relevant information in a timely manner in a state of emergency.   Barriers to Therapy: None Identified  Pt's spiritual, cultural and educational needs considered and pt agreeable to plan of care and goals.  Plan:   Continue POC with focus on rehabilitation and compensation for 4 domains of language.    BRENDAN Carlos-SLP   10/27/2020

## 2020-10-30 ENCOUNTER — CLINICAL SUPPORT (OUTPATIENT)
Dept: REHABILITATION | Facility: HOSPITAL | Age: 75
End: 2020-10-30
Payer: MEDICARE

## 2020-10-30 DIAGNOSIS — R48.2 APRAXIA OF SPEECH: ICD-10-CM

## 2020-10-30 DIAGNOSIS — R47.01 APHASIA: ICD-10-CM

## 2020-10-30 PROCEDURE — 92507 TX SP LANG VOICE COMM INDIV: CPT | Mod: PN

## 2020-10-30 NOTE — PROGRESS NOTES
"Outpatient [facility]rological Rehabilitation   Speech and Language Therapy Treatment Note    Date:  10/30/2020     Name: Agapito Bass   MRN: [id number]   Therapy Diagnosis:   Encounter Diagnoses   Name Primary?   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  Response to previous treatment: "Im doing better."  Pain Scale:  0/10 on VAS currently.   Pain Location: n/a  Objective:         UNTIMED  Procedure Min.   Speech- Language- Voice Therapy  45   Total Timed Units: 0  Total Untimed Units: 1  Charges Billed/# of units: 1    Short Term Goals: (6 weeks) Current Progress:   Pt will name 15 items in a concrete category in 60 seconds.    Progressing/ Not Met 10/30/2020   Pt names 10 items in a concrete category in 60 seconds  15 given additional time   Pt will name 10 items in a abstract category in 60 seconds.     Progressing/ Not Met 10/30/2020   Pt names 7 items in a abstract category in 60 seconds  10 given additional time   Pt will participate in the speech production treatment hierarchy with monosyllabic /m/  Words.      Progressing/ Not Met 10/30/2020   Not addressed in today's session.     Pt will participate in the speech production treatment hierarchy with monosyllabic /b/  Words.    Progressing/Not Met 10/30/2020  Not addressed in today's session.     Pt will participate in the Combined Aphasia " and Apraxia of Speech Treatment (CAAST) with 5 verbs per session to improve verbal fluency and speech intelligibility.      Progressing/ Not Met 10/30/2020   Not addressed in today's session.     Pt will complete Semantic Feature Analysis for 12 nouns with 80% accuracy independently across 2 sessions per noun.      Progressing/ Not Met 10/30/2020   Bandage: 80% accuracy independently, 100% given minimal cues Met x1  : 60% accuracy independently, 100% given moderate cues   Coffee Cup: 60% accuracy independently, 100% given moderate cues     Pt supplements with gestures and writing  Extended time required    Words Met:   Glasses, tissue, phone, keys, ball, scissors, Pencil, calculator      Pt will recall the spelling of 1 syllable words with 80% accuracy using Copy, Anagram, Recall Treatment (CART)  Protocol across 3 sessions.     Progressing/ Not Met 10/30/2020   Pt spelled 1 syllable words with 65% accuracy independently, and 100% accuracy given moderate cues.    Pt recalled the spelling of 1 syllable words with 75% accuracy independently      MET x2   Pt will follow complex 2 step commands given verbal and visual commands with 60% accuracy independently.     Goal Met 10/16/2020   Goal Met- 10/16/2020    Pt will name objects with 80% accuracy given minimal cues across 3 sessions.    Goal Met 10/13/2020  Goal Met- 10/13/2020    Pt will name the category when given 3 words with 90% accuracy independently to improve word finding.    Progressing/Not Met 10/30/2020  Not addressed in today's session.     Pt will name the item when given 3 descriptors with 90% accuracy independently to improve word finding.    Progressing/Not Met 10/30/2020  Not addressed in today's session.    Pt will read a moderate paragraph and answer the multiple choice questions that follow with 90% accuracy independently.    Progressing/Not Met 10/30/2020  Pt read a moderate length paragraph with 95% accuracy independently.    Met x1      Patient Education/Response:   Pt educated on: Supplementing oral speech with drawing when spelling if difficult.    Written Home Exercises Provided: Patient instructed to cont prior HEP.  Exercises were reviewed and Agapito was able to demonstrate them prior to the end of the session.  Agapito demonstrated good  understanding of the education provided.     See EMR under Patient Instructions for exercises provided 10/16/2020.  Assessment:   Agapito is progressing well towards his goals. Increased use of boogie board to supplement oral communication with both written words (for communication partner or to self-cue) and drawings. Pt with increased use of gestures and initiation of  Communication attempts. Increase ease with word fluency tasks as well. Current goals remain appropriate. Goals to be updated as necessary.     Pt prognosis is Good. Pt will continue to benefit from skilled outpatient speech and language therapy to address the deficits listed in the problem list on initial evaluation, provide pt/family education and to maximize pt's level of independence in the home and community environment.   Medical necessity is demonstrated by the following IMPAIRMENTS:  decreased content words and significant word finding difficulty in all situations severely limiting functional communication with both familiar and unfamiliar communication partners to relay medically and safety relevant information in a timely manner in a state of emergency.   Barriers to Therapy: None Identified  Pt's spiritual, cultural and educational needs considered and pt agreeable to plan of care and goals.  Plan:   Continue POC with focus on rehabilitation and compensation for 4 domains of language.    BRENDAN Carlos-SLP   10/30/2020

## 2020-11-03 ENCOUNTER — CLINICAL SUPPORT (OUTPATIENT)
Dept: REHABILITATION | Facility: HOSPITAL | Age: 75
End: 2020-11-03
Payer: MEDICARE

## 2020-11-03 DIAGNOSIS — R47.01 APHASIA: ICD-10-CM

## 2020-11-03 DIAGNOSIS — R48.2 APRAXIA OF SPEECH: ICD-10-CM

## 2020-11-03 PROCEDURE — 92507 TX SP LANG VOICE COMM INDIV: CPT | Mod: PN

## 2020-11-03 NOTE — PROGRESS NOTES
"Outpatient Neurological Rehabilitation   Speech and Language Therapy Treatment Note    Date:  11/3/2020     Name: Agapito Bass   MRN: [id number]   Therapy Diagnosis:   Encounter Diagnoses   Name Primary?   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  Response to previous treatment: Strategies used in oral communication  Pain Scale:  0/10 on VAS currently.   Pain Location: n/a  Objective:         UNTIMED  Procedure Min.   Speech- Language- Voice Therapy  43   Total Timed Units: 0  Total Untimed Units: 1  Charges Billed/# of units: 1    Short Term Goals: (6 weeks) Current Progress:   Pt will name 15 items in a concrete category in 60 seconds.    Progressing/ Not Met 11/3/2020   Not addressed in today's session.    Pt will name 10 items in a abstract category in 60 seconds.     Progressing/ Not Met 11/3/2020   Not addressed in today's session.    Pt will participate in the speech production treatment hierarchy with monosyllabic /m/  Words.      Progressing/ Not Met 11/3/2020   Not addressed in today's session.     Pt will participate in the speech production treatment hierarchy with monosyllabic /b/  Words.    Progressing/Not Met 11/3/2020  Not addressed in today's session.     Pt will participate in the Combined Aphasia and Apraxia of Speech " Treatment (CAAST) with 5 verbs per session to improve verbal fluency and speech intelligibility.      Progressing/ Not Met 11/3/2020   Not addressed in today's session.     Pt will complete Semantic Feature Analysis for 12 nouns with 80% accuracy independently across 2 sessions per noun.      Progressing/ Not Met 11/3/2020   Not addressed in today's session.     Words Met:   Glasses, tissue, phone, keys, ball, scissors, Pencil, calculator      Pt will recall the spelling of 1 syllable words with 80% accuracy using Copy, Anagram, Recall Treatment (CART)  Protocol across 3 sessions.     Progressing/ Not Met 11/3/2020   Pt spelled 1 syllable words with 65% accuracy independently, and 100% accuracy given moderate cues.    Pt recalled the spelling of 1 syllable words with 75% accuracy independently, 100 % given minimal cues    MET x2   Pt will follow complex 2 step commands given verbal and visual commands with 60% accuracy independently.     Goal Met 10/16/2020   Goal Met- 10/16/2020    Pt will name objects with 80% accuracy given minimal cues across 3 sessions.    Goal Met 10/13/2020  Goal Met- 10/13/2020    Pt will name the category when given 3 words with 90% accuracy independently to improve word finding.    Progressing/Not Met 11/3/2020  Not addressed in today's session.     Pt will name the item when given 3 descriptors with 90% accuracy independently to improve word finding.    Progressing/Not Met 11/3/2020  Not addressed in today's session.    Pt will read a moderate paragraph and answer the multiple choice questions that follow with 90% accuracy independently.    Progressing/Not Met 11/3/2020  Pt read a moderate length paragraph with 80% accuracy independently. Extended time required.    Met x1     Patient Education/Response:   Pt educated on: spelling and strategies for increasing accuracy with spelling    Written Home Exercises Provided: Patient instructed to cont prior HEP.  Exercises were reviewed and Agapito  was able to demonstrate them prior to the end of the session.  Agapito demonstrated good  understanding of the education provided.     See EMR under Patient Instructions for exercises provided 10/16/2020.  Assessment:   Agapito is progressing well towards his goals. Increased use of functional communication strategies when writing is not available. Pt is beginning to write letters in the air more for self-cue. Current goals remain appropriate. Goals to be updated as necessary.     Pt prognosis is Good. Pt will continue to benefit from skilled outpatient speech and language therapy to address the deficits listed in the problem list on initial evaluation, provide pt/family education and to maximize pt's level of independence in the home and community environment.   Medical necessity is demonstrated by the following IMPAIRMENTS:  decreased content words and significant word finding difficulty in all situations severely limiting functional communication with both familiar and unfamiliar communication partners to relay medically and safety relevant information in a timely manner in a state of emergency.   Barriers to Therapy: None Identified  Pt's spiritual, cultural and educational needs considered and pt agreeable to plan of care and goals.  Plan:   Continue POC with focus on rehabilitation and compensation for 4 domains of language.    BRENDAN Carlos-SLP   11/3/2020

## 2020-11-06 ENCOUNTER — CLINICAL SUPPORT (OUTPATIENT)
Dept: REHABILITATION | Facility: HOSPITAL | Age: 75
End: 2020-11-06
Payer: MEDICARE

## 2020-11-06 DIAGNOSIS — R48.2 APRAXIA OF SPEECH: ICD-10-CM

## 2020-11-06 DIAGNOSIS — R47.01 APHASIA: ICD-10-CM

## 2020-11-06 PROCEDURE — 92507 TX SP LANG VOICE COMM INDIV: CPT | Mod: PN

## 2020-11-06 NOTE — PROGRESS NOTES
"Outpatient [facility]rological Rehabilitation   Speech and Language Therapy Treatment Note    Date:  11/6/2020     Name: Agapito Bass   MRN: [id number]   Therapy Diagnosis:   Encounter Diagnoses   Name Primary?   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  Response to previous treatment: Supplements with gestures and air drawing  Pain Scale:  0/10 on VAS currently.   Pain Location: n/a  Objective:         UNTIMED  Procedure Min.   Speech- Language- Voice Therapy  45   Total Timed Units: 0  Total Untimed Units: 1  Charges Billed/# of units: 1    Short Term Goals: (6 weeks) Current Progress:   Pt will name 15 items in a concrete category in 60 seconds.    Progressing/ Not Met 11/6/2020   Pt named 8 items in a concrete category in 60 seconds. 18 items total given extended time.   Pt will name 10 items in a abstract category in 60 seconds.     Progressing/ Not Met 11/6/2020   Pt named 5 items in an abstract category.   Pt will participate in the speech production treatment hierarchy with monosyllabic /m/  Words.      Progressing/ Not Met 11/6/2020   Not addressed in today's session.     Pt will participate in the speech production treatment hierarchy with monosyllabic /b/  Words.    Progressing/Not Met 11/6/2020  Not addressed in today's session.     Pt will participate in the " Combined Aphasia and Apraxia of Speech Treatment (CAAST) with 5 verbs per session to improve verbal fluency and speech intelligibility.      Progressing/ Not Met 11/6/2020   Not addressed in today's session.     Pt will complete Semantic Feature Analysis for 12 nouns with 80% accuracy independently across 2 sessions per noun.      Progressing/ Not Met 11/6/2020   Bandage: 100% accuracy independently Met x2  : 80% accuracy independently, 100% given minimal cues Met x1  Coffee Cup: 80% accuracy independently, 100% given minimal cues Met x1    Words Met:   Glasses, tissue, phone, keys, ball, scissors, Pencil, calculator      Pt will recall the spelling of 1 syllable words with 80% accuracy using Copy, Anagram, Recall Treatment (CART)  Protocol across 3 sessions.     Goal Met 11/6/2020   Pt spelled 1 syllable words with 60% accuracy independently, and 100% accuracy given moderate cues.    Pt recalled the spelling of 1 syllable words with 100% accuracy independently    Goal Met 11/6/2020     Pt will follow complex 2 step commands given verbal and visual commands with 60% accuracy independently.     Goal Met 10/16/2020   Goal Met- 10/16/2020    Pt will name objects with 80% accuracy given minimal cues across 3 sessions.    Goal Met 10/13/2020  Goal Met- 10/13/2020    Pt will name the category when given 3 words with 90% accuracy independently to improve word finding.    Progressing/Not Met 11/6/2020  Not addressed in today's session.     Pt will name the item when given 3 descriptors with 90% accuracy independently to improve word finding.    Progressing/Not Met 11/6/2020  Not addressed in today's session.    Pt will read a moderate paragraph and answer the multiple choice questions that follow with 90% accuracy independently.    Progressing/Not Met 11/6/2020  Pt read a moderate length paragraph with 100% accuracy independently.     Met x2     Patient Education/Response:   Pt educated on: continuing strategy use  at home.    Written Home Exercises Provided: Patient instructed to cont prior HEP.  Exercises were reviewed and Agapito was able to demonstrate them prior to the end of the session.  Agapito demonstrated good  understanding of the education provided.     See EMR under Patient Instructions for exercises provided 10/16/2020.  Assessment:   Agapito is progressing well towards his goals. Pt with increased complexity with speech. Pt with increased functional reading with increased speed as well. Pt notes his spelling to supplement speech has also improved. Current goals remain appropriate. Goals to be updated as necessary.     Pt prognosis is Good. Pt will continue to benefit from skilled outpatient speech and language therapy to address the deficits listed in the problem list on initial evaluation, provide pt/family education and to maximize pt's level of independence in the home and community environment.   Medical necessity is demonstrated by the following IMPAIRMENTS:  decreased content words and significant word finding difficulty in all situations severely limiting functional communication with both familiar and unfamiliar communication partners to relay medically and safety relevant information in a timely manner in a state of emergency.   Barriers to Therapy: None Identified  Pt's spiritual, cultural and educational needs considered and pt agreeable to plan of care and goals.  Plan:   Continue POC with focus on rehabilitation and compensation for 4 domains of language.    Lisa Padilla CCC-SLP   11/6/2020

## 2020-11-10 ENCOUNTER — CLINICAL SUPPORT (OUTPATIENT)
Dept: REHABILITATION | Facility: HOSPITAL | Age: 75
End: 2020-11-10
Payer: MEDICARE

## 2020-11-10 DIAGNOSIS — R47.01 APHASIA: ICD-10-CM

## 2020-11-10 DIAGNOSIS — R48.2 APRAXIA OF SPEECH: ICD-10-CM

## 2020-11-10 PROCEDURE — 92507 TX SP LANG VOICE COMM INDIV: CPT | Mod: PN

## 2020-11-10 NOTE — PROGRESS NOTES
"Outpatient [facility]rological Rehabilitation   Speech and Language Therapy Treatment Note    Date:  11/10/2020     Name: Agapito Bass   MRN: [id number]   Therapy Diagnosis:   Encounter Diagnoses   Name Primary?   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  Response to previous treatment: Supplements with gestures and air drawing- continued  Pain Scale:  0/10 on VAS currently.   Pain Location: n/a  Objective:         UNTIMED  Procedure Min.   Speech- Language- Voice Therapy  43   Total Timed Units: 0  Total Untimed Units: 1  Charges Billed/# of units: 1    Short Term Goals: (6 weeks) Current Progress:   Pt will name 15 items in a concrete category in 60 seconds.    Progressing/ Not Met 11/10/2020   Pt named 11 items in a concrete category in 60 seconds.    Pt will name 10 items in a abstract category in 60 seconds.     Progressing/ Not Met 11/10/2020   Pt named 6 items in an abstract category.   Pt will participate in the speech production treatment hierarchy with monosyllabic /m/  Words.      Progressing/ Not Met 11/10/2020   Not addressed in today's session.     Pt will participate in the speech production treatment hierarchy with monosyllabic /b/  Words.    Progressing/Not Met 11/10/2020  Not addressed in today's session.     Pt will participate in the Combined Aphasia and Apraxia of Speech Treatment " (CAAST) with 5 verbs per session to improve verbal fluency and speech intelligibility.      Progressing/ Not Met 11/10/2020   Not addressed in today's session.     Pt will complete Semantic Feature Analysis for 12 nouns with 80% accuracy independently across 2 sessions per noun.      Progressing/ Not Met 11/10/2020   : 80% accuracy independently, 100% given minimal cues Met x2  Coffee Cup: 80% accuracy independently, 100% given minimal cues Met x2  Calendar: 80% accuracy independently, 100% given minimal cues Met x1    Words Met:   Glasses, tissue, phone, keys, ball, scissors, Pencil, calculator, Bandage, , Coffee Cup      Pt will recall the spelling of 1 syllable words with 80% accuracy using Copy, Anagram, Recall Treatment (CART)  Protocol across 3 sessions.     Goal Met 11/6/2020   Goal Met 11/6/2020     Pt will follow complex 2 step commands given verbal and visual commands with 60% accuracy independently.     Goal Met 10/16/2020   Goal Met- 10/16/2020    Pt will name objects with 80% accuracy given minimal cues across 3 sessions.    Goal Met 10/13/2020  Goal Met- 10/13/2020    Pt will name the category when given 3 words with 90% accuracy independently to improve word finding.    Progressing/Not Met 11/10/2020  Not addressed in today's session.     Pt will name the item when given 3 descriptors with 90% accuracy independently to improve word finding.    Progressing/Not Met 11/10/2020  Not addressed in today's session.    Pt will read a moderate paragraph and answer the multiple choice questions that follow with 90% accuracy independently.    Goal Met 11/10/2020   Pt read a moderate length paragraph with 94% accuracy independently.     Goal Met 11/10/2020     GOAL ADDED: Pt will spell 2 syllable words with 70% accuracy independently.    Progressing/Not Met 11/10/2020  Pt spelled 2 syllable words with 60% accuracy independently. Copy, Anagram, Recall Treatment (CART) was used to facilitate  spelling accuracy when cues were needed.     Patient Education/Response:   Pt educated on: continuing strategy use at home and increasing functional communication through writing supplementation and gestures.    Written Home Exercises Provided: Patient instructed to cont prior HEP.  Exercises were reviewed and Agapito was able to demonstrate them prior to the end of the session.  Agapito demonstrated good  understanding of the education provided.     See EMR under Patient Instructions for exercises provided 10/16/2020.  Assessment:   Agapito is progressing well towards his goals. Increased spelling and reading comprehension accuracy. Pt continues to be successful with use of SFA. Increased spontaneous communication with increased independence. Current goals remain appropriate. Goals to be updated as necessary.     Pt prognosis is Good. Pt will continue to benefit from skilled outpatient speech and language therapy to address the deficits listed in the problem list on initial evaluation, provide pt/family education and to maximize pt's level of independence in the home and community environment.   Medical necessity is demonstrated by the following IMPAIRMENTS:  decreased content words and significant word finding difficulty in all situations severely limiting functional communication with both familiar and unfamiliar communication partners to relay medically and safety relevant information in a timely manner in a state of emergency.   Barriers to Therapy: None Identified  Pt's spiritual, cultural and educational needs considered and pt agreeable to plan of care and goals.  Plan:   Continue POC with focus on rehabilitation and compensation for 4 domains of language.    Lisa Padilla CCC-SLP   11/10/2020

## 2020-11-13 ENCOUNTER — CLINICAL SUPPORT (OUTPATIENT)
Dept: REHABILITATION | Facility: HOSPITAL | Age: 75
End: 2020-11-13
Payer: MEDICARE

## 2020-11-13 DIAGNOSIS — R47.01 APHASIA: ICD-10-CM

## 2020-11-13 DIAGNOSIS — R48.2 APRAXIA OF SPEECH: ICD-10-CM

## 2020-11-13 PROCEDURE — 92507 TX SP LANG VOICE COMM INDIV: CPT | Mod: PN

## 2020-11-13 NOTE — PROGRESS NOTES
Outpatient [facility] Rehabilitation   Speech and Language Therapy Treatment Note    Date:  11/13/2020     Name: Agapito Bass   MRN: [id number]   Therapy Diagnosis:   Encounter Diagnoses   Name Primary?   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  He was compliant with given HEP.  Response to previous treatment: Increased functional communication strategies.  Pain Scale:  0/10 on VAS currently.   Pain Location: n/a  Objective:         UNTIMED  Procedure Min.   Speech- Language- Voice Therapy  43   Total Timed Units: 0  Total Untimed Units: 1  Charges Billed/# of units: 1    Short Term Goals: (6 weeks) Current Progress:   Pt will name 15 items in a concrete category in 60 seconds.    Progressing/ Not Met 11/13/2020   Not addressed in today's session.    Pt will name 10 items in a abstract category in 60 seconds.     Progressing/ Not Met 11/13/2020   Not addressed in today's session.    Pt will participate in the speech production treatment hierarchy with monosyllabic /m/  Words.      Progressing/ Not Met 11/13/2020   Not addressed in today's session.     Pt will participate in the speech production treatment hierarchy with monosyllabic /b/  Words.    Progressing/Not Met 11/13/2020  Not addressed in today's session.     Pt will participate in the Combined Aphasia and Apraxia of Speech  Treatment (CAAST) with 5 verbs per session to improve verbal fluency and speech intelligibility.      Progressing/ Not Met 11/13/2020   Not addressed in today's session.     Pt will complete Semantic Feature Analysis for 12 nouns with 80% accuracy independently across 2 sessions per noun.      Goal Met 11/13/2020   Calendar: 100% accuracy independently    Words Met:   Glasses, tissue, phone, keys, ball, scissors, Pencil, calculator, Bandage, , Coffee Cup, Calendar    Goal Met 11/13/2020     Pt will recall the spelling of 1 syllable words with 80% accuracy using Copy, Anagram, Recall Treatment (CART)  Protocol across 3 sessions.  Goal Met 11/6/2020     Pt will follow complex 2 step commands given verbal and visual commands with 60% accuracy independently. Goal Met- 10/16/2020    Pt will name objects with 80% accuracy given minimal cues across 3 sessions. Goal Met- 10/13/2020    Pt will name the category when given 3 words with 90% accuracy independently to improve word finding.    Progressing/Not Met 11/13/2020  Pt named the category given 3 words with 80% accuracy independently, 100% given minimal cues.    Pt will name the item when given 3 descriptors with 90% accuracy independently to improve word finding.    Progressing/Not Met 11/13/2020  Pt named the item given 3 descriptors with 80% accuracy independently, 100% given minimal cues   Pt will read a moderate paragraph and answer the multiple choice questions that follow with 90% accuracy independently. Goal Met 11/10/2020     Pt will spell 2 syllable words with 70% accuracy independently.    Progressing/Not Met 11/13/2020  Pt spelled 2 syllable words with 70% accuracy independently. 90% given minimal cues. Copy, Anagram, Recall Treatment (CART) was used to facilitate spelling accuracy when cues were needed.    Met x1     Patient Education/Response:   Pt educated on: strategies for communicating with family members during the holidays.    Written Home  Exercises Provided: Patient instructed to cont prior HEP.  Exercises were reviewed and Agapito was able to demonstrate them prior to the end of the session.  Agapito demonstrated good  understanding of the education provided.     See EMR under Patient Instructions for exercises provided 10/16/2020.  Assessment:   Agapito is progressing well towards his goals. Increased accuracy communicating complex ideas with use of communication strategies. Spelling continues to be difficult for the patient; however, great improvement noted. Current goals remain appropriate. Goals to be updated as necessary.     Pt prognosis is Good. Pt will continue to benefit from skilled outpatient speech and language therapy to address the deficits listed in the problem list on initial evaluation, provide pt/family education and to maximize pt's level of independence in the home and community environment.   Medical necessity is demonstrated by the following IMPAIRMENTS:  decreased content words and significant word finding difficulty in all situations severely limiting functional communication with both familiar and unfamiliar communication partners to relay medically and safety relevant information in a timely manner in a state of emergency.   Barriers to Therapy: None Identified  Pt's spiritual, cultural and educational needs considered and pt agreeable to plan of care and goals.  Plan:   Continue POC with focus on rehabilitation and compensation for 4 domains of language.    Lisa Padilla CCC-SLP   11/13/2020

## 2020-11-17 ENCOUNTER — CLINICAL SUPPORT (OUTPATIENT)
Dept: REHABILITATION | Facility: HOSPITAL | Age: 75
End: 2020-11-17
Payer: MEDICARE

## 2020-11-17 DIAGNOSIS — R47.01 APHASIA: ICD-10-CM

## 2020-11-17 DIAGNOSIS — R48.2 APRAXIA OF SPEECH: ICD-10-CM

## 2020-11-17 PROCEDURE — 92507 TX SP LANG VOICE COMM INDIV: CPT | Mod: PN

## 2020-11-17 NOTE — PROGRESS NOTES
"Outpatient [facility] Rehabilitation   Speech and Language Therapy Treatment Note    Date:  11/17/2020     Name: Agapito Bass   MRN: [id number]   Therapy Diagnosis:   Encounter Diagnoses   Name Primary?   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 It makes me think of new words."  He was compliant with given HEP.  Response to previous treatment: Increased frustration with spelling 2 syllable words  Pain Scale:  0/10 on VAS currently.   Pain Location: n/a  Objective:         UNTIMED  Procedure Min.   Speech- Language- Voice Therapy  47   Total Timed Units: 0  Total Untimed Units: 1  Charges Billed/# of units: 1    Short Term Goals: (6 weeks) Current Progress:   Pt will name 15 items in a concrete category in 60 seconds.    Progressing/ Not Met 11/17/2020   Not addressed in today's session.    Pt will name 10 items in a abstract category in 60 seconds.     Progressing/ Not Met 11/17/2020   Not addressed in today's session.    Pt will participate in the speech production treatment hierarchy with monosyllabic /m/  Words.      Progressing/ Not Met 11/17/2020   Not addressed in today's session.     Pt will participate in the speech production treatment hierarchy with monosyllabic /b/  Words.    Progressing/Not Met 11/17/2020  Not addressed in today's session.     Pt will participate in the Combined Aphasia and Apraxia of Speech " Treatment (CAAST) with 5 verbs per session to improve verbal fluency and speech intelligibility.      Progressing/ Not Met 11/17/2020   Not addressed in today's session.     Pt will complete Semantic Feature Analysis for 12 nouns with 80% accuracy independently across 2 sessions per noun.   Goal Met 11/13/2020     Pt will recall the spelling of 1 syllable words with 80% accuracy using Copy, Anagram, Recall Treatment (CART)  Protocol across 3 sessions.  Goal Met 11/6/2020     Pt will follow complex 2 step commands given verbal and visual commands with 60% accuracy independently. Goal Met- 10/16/2020    Pt will name objects with 80% accuracy given minimal cues across 3 sessions. Goal Met- 10/13/2020    Pt will name the category when given 3 words with 90% accuracy independently to improve word finding.    Progressing/Not Met 11/17/2020  Not addressed in today's session.     Pt will name the item when given 3 descriptors with 90% accuracy independently to improve word finding.    Progressing/Not Met 11/17/2020  Not addressed in today's session.    Pt will read a moderate paragraph and answer the multiple choice questions that follow with 90% accuracy independently. Goal Met 11/10/2020     Pt will spell 2 syllable words with 70% accuracy independently.    Progressing/Not Met 11/17/2020  Pt spelled 2 syllable words with 20% accuracy independently. 90% given maximum cues. Copy, Anagram, Recall Treatment (CART) was used to facilitate spelling accuracy when cues were needed.    Previously Met x1   GOAL ADDED: Pt will participate in Verb Network Strength Training for 2-4 verbs per session with min A to improve word fluency.    Progressing/Not Met 11/17/2020  See data below     Verb Network Strength Training (VNEST) was introduced today. VNEST focuses on verbs, encouraging people to think of people who perform actions (agents) and the objects or the actions that are performed on (patients). The idea is that by focusing on  verbs, which require connection to nouns, you can strengthen all the words in the mental network around the verb. In this therapy technique, there are 6 steps each verb is taken through.   Step 1: Pt provided 6/9  subjects and objects in verb triads IND'ly, 8/9  given cues.   Step 2: Pt read triads of words aloud with 55 % acc independently.  Step 3: pt expanded where with 33% acc indly, 60% acc given moerate cues, when with 33% acc, 66% acc given moderate cues, and why with 66% acc indly, 100% acc given moderate cues.   Step 4: pt identified whether sentences presented auditorily were correct or incorrect with 90% acc IND'ly.   Step 5: pt recalled the target verb with 33% acc IND'ly (1/3 ).  Step 6: not addressed    Patient Education/Response:   Pt educated on: Verb Network Strength Training    Written Home Exercises Provided: Patient instructed to cont prior HEP.  Exercises were reviewed and Agapito was able to demonstrate them prior to the end of the session.  Agapito demonstrated good  understanding of the education provided.     See EMR under Patient Instructions for exercises provided 10/16/2020.  Assessment:   Agapito is progressing well towards his goals.Increased complexity of task as VNeST goal was added. Increased frustration with 2 syllable word spelling. Current goals remain appropriate. Goals to be updated as necessary.     Pt prognosis is Good. Pt will continue to benefit from skilled outpatient speech and language therapy to address the deficits listed in the problem list on initial evaluation, provide pt/family education and to maximize pt's level of independence in the home and community environment.   Medical necessity is demonstrated by the following IMPAIRMENTS:  decreased content words and significant word finding difficulty in all situations severely limiting functional communication with both familiar and unfamiliar communication partners to relay medically and safety relevant information in a timely  manner in a state of emergency.   Barriers to Therapy: None Identified  Pt's spiritual, cultural and educational needs considered and pt agreeable to plan of care and goals.  Plan:   Continue POC with focus on rehabilitation and compensation for 4 domains of language.    Lisa Padilla CCC-SLP   11/17/2020

## 2020-11-20 ENCOUNTER — CLINICAL SUPPORT (OUTPATIENT)
Dept: REHABILITATION | Facility: HOSPITAL | Age: 75
End: 2020-11-20
Payer: MEDICARE

## 2020-11-20 DIAGNOSIS — R47.01 APHASIA: ICD-10-CM

## 2020-11-20 DIAGNOSIS — R48.2 APRAXIA OF SPEECH: ICD-10-CM

## 2020-11-20 PROCEDURE — 92507 TX SP LANG VOICE COMM INDIV: CPT | Mod: PN

## 2020-11-20 NOTE — PROGRESS NOTES
"  Outpatient Neurological Rehabilitation   Speech and Language Therapy Treatment Note    Date:  11/20/2020     Name: Agapito Bass   MRN: [id number]   Therapy Diagnosis:   Encounter Diagnoses   Name Primary?   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  Response to previous treatment: Increased word retrieval noted  Pain Scale:  0/10 on VAS currently.   Pain Location: n/a  Objective:         UNTIMED  Procedure Min.   Speech- Language- Voice Therapy  45   Total Timed Units: 0  Total Untimed Units: 1  Charges Billed/# of units: 1    Short Term Goals: (6 weeks) Current Progress:   Pt will name 15 items in a concrete category in 60 seconds.    Progressing/ Not Met 11/20/2020   Pt named 11 items in a concrete category in 60 seconds.    Pt will name 10 items in a abstract category in 60 seconds.     Progressing/ Not Met 11/20/2020   Pt named 10 items in a abstract category in 60 seconds.      Met x1   Pt will participate in the speech production treatment hierarchy with monosyllabic /m/  Words.      Progressing/ Not Met 11/20/2020   Not addressed in today's session.     Pt will participate in the speech production treatment hierarchy with monosyllabic /b/  Words.    Progressing/Not Met 11/20/2020  Not addressed in today's session.     Pt will participate in the Combined Aphasia and Apraxia of Speech Treatment " (CAAST) with 5 verbs per session to improve verbal fluency and speech intelligibility.      Progressing/ Not Met 11/20/2020   Not addressed in today's session.     Pt will complete Semantic Feature Analysis for 12 nouns with 80% accuracy independently across 2 sessions per noun.   Goal Met 11/13/2020     Pt will recall the spelling of 1 syllable words with 80% accuracy using Copy, Anagram, Recall Treatment (CART)  Protocol across 3 sessions.  Goal Met 11/6/2020     Pt will follow complex 2 step commands given verbal and visual commands with 60% accuracy independently. Goal Met- 10/16/2020    Pt will name objects with 80% accuracy given minimal cues across 3 sessions. Goal Met- 10/13/2020    Pt will name the category when given 3 words with 90% accuracy independently to improve word finding.    Progressing/Not Met 11/20/2020  Not addressed in today's session.     Pt will name the item when given 3 descriptors with 90% accuracy independently to improve word finding.    Progressing/Not Met 11/20/2020  Not addressed in today's session.    Pt will read a moderate paragraph and answer the multiple choice questions that follow with 90% accuracy independently. Goal Met 11/10/2020     Pt will spell 2 syllable words with 70% accuracy independently.    Progressing/Not Met 11/20/2020  Pt spelled 2 syllable words with 50% accuracy independently. 90% given moderate cues. Copy, Anagram, Recall Treatment (CART) was used to facilitate spelling accuracy when cues were needed.    Previously Met x1   Pt will participate in Verb Network Strength Training for 2-4 verbs per session with min A to improve word fluency.    Progressing/Not Met 11/20/2020  See data below     Verb Network Strength Training (VNEST) was introduced today. VNEST focuses on verbs, encouraging people to think of people who perform actions (agents) and the objects or the actions that are performed on (patients). The idea is that by focusing on verbs, which require  connection to nouns, you can strengthen all the words in the mental network around the verb. In this therapy technique, there are 6 steps each verb is taken through.   Step 1: Pt provided 6/9  subjects and objects in verb triads IND'ly, 8/9  given cues.   Step 2: Pt read triads of words aloud with 60% acc independently.  Step 3: pt expanded where with 60% acc indly, 90% acc given moerate cues, when with 66% acc, 100% acc given moderate cues, and why with 33% acc indly, 100% acc given moderate cues.   Step 4: pt identified whether sentences presented auditorily were correct or incorrect with 80% acc IND'ly.   Step 5: pt recalled the target verb with 33% acc IND'ly (1/3 ).  Step 6: not addressed    Patient Education/Response:   Pt educated on: strategies for thanks giving    Written Home Exercises Provided: Patient instructed to cont prior HEP.  Exercises were reviewed and Agapito was able to demonstrate them prior to the end of the session.  Agapito demonstrated good  understanding of the education provided.     See EMR under Patient Instructions for exercises provided 10/16/2020.  Assessment:   Agapito is progressing well towards his goals.Increased spontaneous word finding as compared to previous sessions. Pt initiated conversation on more complex topics with use of strategies. Pt with increased spelling accuracy to facilitate communication. Current goals remain appropriate. Goals to be updated as necessary.     Pt prognosis is Good. Pt will continue to benefit from skilled outpatient speech and language therapy to address the deficits listed in the problem list on initial evaluation, provide pt/family education and to maximize pt's level of independence in the home and community environment.   Medical necessity is demonstrated by the following IMPAIRMENTS:  decreased content words and significant word finding difficulty in all situations severely limiting functional communication with both familiar and unfamiliar  communication partners to relay medically and safety relevant information in a timely manner in a state of emergency.   Barriers to Therapy: None Identified  Pt's spiritual, cultural and educational needs considered and pt agreeable to plan of care and goals.  Plan:   Continue POC with focus on rehabilitation and compensation for 4 domains of language.    Lisa Padilla CCC-SLP   11/20/2020

## 2020-11-24 ENCOUNTER — CLINICAL SUPPORT (OUTPATIENT)
Dept: REHABILITATION | Facility: HOSPITAL | Age: 75
End: 2020-11-24
Payer: MEDICARE

## 2020-11-24 DIAGNOSIS — R48.2 APRAXIA OF SPEECH: ICD-10-CM

## 2020-11-24 DIAGNOSIS — R47.01 APHASIA: ICD-10-CM

## 2020-11-24 PROCEDURE — 92507 TX SP LANG VOICE COMM INDIV: CPT | Mod: PN

## 2020-11-24 NOTE — PROGRESS NOTES
"  Outpatient Neurological Rehabilitation   Speech and Language Therapy Treatment Note  Progress Note    Date:  11/24/2020     Name: Agapito Bass   MRN: [id number]   Therapy Diagnosis:   Encounter Diagnoses   Name Primary?   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  Response to previous treatment: Increased topic complexity noted.   Pain Scale:  0/10 on VAS currently.   Pain Location: n/a  Objective:         UNTIMED  Procedure Min.   Speech- Language- Voice Therapy  45   Total Timed Units: 0  Total Untimed Units: 1  Charges Billed/# of units: 1    Short Term Goals: (6 weeks) Current Progress:   Pt will name 15 items in a concrete category in 60 seconds.    Progressing/ Not Met 11/24/2020   Pt named 5 items in a concrete category in 60 seconds. --Vegetables  Pt limited by severe perseveration with apraxic characterostics   Pt will name 10 items in a abstract category in 60 seconds.     Progressing/ Not Met 11/24/2020   Pt named 10 items in a abstract category in 60 seconds. -Items that run    Met x2   Pt will participate in the speech production treatment hierarchy with monosyllabic /m/  Words.      Progressing/ Not Met 11/24/2020   See Data Below    Met x1   Pt will participate in the speech production treatment hierarchy with monosyllabic /b/  Words.    Progressing/Not " Met 11/24/2020  Not addressed in today's session.     Pt will participate in the Combined Aphasia and Apraxia of Speech Treatment (CAAST) with 5 verbs per session to improve verbal fluency and speech intelligibility.      Progressing/ Not Met 11/24/2020   Not addressed in today's session.     Pt will complete Semantic Feature Analysis for 12 nouns with 80% accuracy independently across 2 sessions per noun.   Goal Met 11/13/2020     Pt will recall the spelling of 1 syllable words with 80% accuracy using Copy, Anagram, Recall Treatment (CART)  Protocol across 3 sessions.  Goal Met 11/6/2020     Pt will follow complex 2 step commands given verbal and visual commands with 60% accuracy independently. Goal Met- 10/16/2020    Pt will name objects with 80% accuracy given minimal cues across 3 sessions. Goal Met- 10/13/2020    Pt will name the category when given 3 words with 90% accuracy independently to improve word finding.    Progressing/Not Met 11/24/2020  Pt named the category given 3 words with 70% accuracy independently      Pt will name the item when given 3 descriptors with 90% accuracy independently to improve word finding.    Progressing/Not Met 11/24/2020  Not addressed in today's session.    Pt will read a moderate paragraph and answer the multiple choice questions that follow with 90% accuracy independently. Goal Met 11/10/2020     Pt will spell 2 syllable words with 70% accuracy independently.    Progressing/Not Met 11/24/2020  Pt spelled 2 syllable words with 40% accuracy independently. 80% given moderate cues. Copy, Anagram, Recall Treatment (CART) was used to facilitate spelling accuracy when cues were needed.    Previously Met x1   Pt will participate in Verb Network Strength Training for 2-4 verbs per session with min A to improve word fluency.    Progressing/Not Met 11/24/2020  See data below     The sound production treatment hierarchy is an articulatory kinematic treatment designed to improve  production of targeted sounds in the context of words, phrases, and sentences. It is individualized to the AOS speaker.  Four steps are available within the hierarchy.  Each step in the hierarchy is response contingent meaning the hierarchy is not reversed and each step is progressed based on the previous response.    Step 1 - Repetition / Minimal Contrast Word Step 2 - Printed Letter or Target Sound with Repetition Step 3- Integral Stimulation Step 4- Articulatory Placement Cues 5 Repetitions of Correct Production   Mom + n/a n/a n/a Accurate   Moon + n/a n/a n/a Accurate   Man + n/a n/a n/a Accurate   Mine + n/a n/a n/a Accurate   Move + n/a n/a n/a    Accurate   Men - - + n/a Accurate   Me + n/a n/a n/a Accurate   Moose + n/a n/a n/a Accurate       Verb Network Strength Training (VNEST) was introduced today. VNEST focuses on verbs, encouraging people to think of people who perform actions (agents) and the objects or the actions that are performed on (patients). The idea is that by focusing on verbs, which require connection to nouns, you can strengthen all the words in the mental network around the verb. In this therapy technique, there are 6 steps each verb is taken through.   Step 1: Pt provided 2/3  subjects and objects in verb triads IND'ly, 8/9  given cues.   Step 2: Pt read triads of words aloud with 40% acc independently.  Step 3: pt expanded where with 100% acc indly, when with 100% acc, and why with 0% acc indly, 100% acc given maximum cues.   Step 4: pt identified whether sentences presented auditorily were correct or incorrect with 90% acc IND'ly.   Step 5: pt recalled the target verb with 100% acc IND'ly (1/1 ).  Step 6: not addressed    Patient Education/Response:   Pt educated on: naming strtageies    Written Home Exercises Provided: Patient instructed to cont prior HEP.  Exercises were reviewed and Agapito was able to demonstrate them prior to the end of the session.  Agapito demonstrated good   understanding of the education provided.     See EMR under Patient Instructions for exercises provided 10/16/2020.  Assessment:   Progress note: Pt has increased functional communication with use of strategies such as gesturing, air writings, and physically writing wit supplement verbal communication. Pt continues to be limited 2/2 to apraxia of speech. Pt is beginning to initiate more complex topics and utilize strategies to supplement when needed. Spelling is improving, making strategies more successful. Pt continues to display deficits which warrant skilled ST services.    Agapito is progressing well towards his goals.Current goals remain appropriate. Goals to be updated as necessary.     Pt prognosis is Good. Pt will continue to benefit from skilled outpatient speech and language therapy to address the deficits listed in the problem list on initial evaluation, provide pt/family education and to maximize pt's level of independence in the home and community environment.   Medical necessity is demonstrated by the following IMPAIRMENTS:  decreased content words and significant word finding difficulty in all situations severely limiting functional communication with both familiar and unfamiliar communication partners to relay medically and safety relevant information in a timely manner in a state of emergency.   Barriers to Therapy: None Identified  Pt's spiritual, cultural and educational needs considered and pt agreeable to plan of care and goals.  Plan:   Continue POC with focus on rehabilitation and compensation for 4 domains of language.    Lisa Padilla CCC-SLP   11/24/2020

## 2020-11-27 ENCOUNTER — CLINICAL SUPPORT (OUTPATIENT)
Dept: REHABILITATION | Facility: HOSPITAL | Age: 75
End: 2020-11-27
Payer: MEDICARE

## 2020-11-27 DIAGNOSIS — R48.2 APRAXIA OF SPEECH: ICD-10-CM

## 2020-11-27 DIAGNOSIS — R47.01 APHASIA: ICD-10-CM

## 2020-11-27 PROCEDURE — 92507 TX SP LANG VOICE COMM INDIV: CPT | Mod: PN

## 2020-11-27 NOTE — PROGRESS NOTES
"Outpatient Neurological Rehabilitation   Speech and Language Therapy Treatment Note    Date:  11/27/2020     Name: Agapito Bass   MRN: [id number]   Therapy Diagnosis:   Encounter Diagnoses   Name Primary?   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  Response to previous treatment: Increased topic complexity noted- continued  Pain Scale:  0/10 on VAS currently.   Pain Location: n/a  Objective:         UNTIMED  Procedure Min.   Speech- Language- Voice Therapy  45   Total Timed Units: 0  Total Untimed Units: 1  Charges Billed/# of units: 1    Short Term Goals: (6 weeks) Current Progress:   Pt will name 15 items in a concrete category in 60 seconds.    Progressing/ Not Met 11/27/2020   Not addressed in today's session.    Pt will name 10 items in a abstract category in 60 seconds.     Progressing/ Not Met 11/27/2020   Not addressed in today's session.     Met x2   Pt will participate in the speech production treatment hierarchy with monosyllabic /m/  Words.      Progressing/ Not Met 11/27/2020   Not addressed in today's session.     Met x1   Pt will participate in the speech production treatment hierarchy with monosyllabic /b/  Words.    Progressing/Not Met 11/27/2020  Not addressed in today's session.     Pt will participate in the Combined Aphasia and Apraxia of Speech Treatment (CAAST) with 5 " verbs per session to improve verbal fluency and speech intelligibility.      Progressing/ Not Met 11/27/2020   Not addressed in today's session.     Pt will complete Semantic Feature Analysis for 12 nouns with 80% accuracy independently across 2 sessions per noun.   Goal Met 11/13/2020     Pt will recall the spelling of 1 syllable words with 80% accuracy using Copy, Anagram, Recall Treatment (CART)  Protocol across 3 sessions.  Goal Met 11/6/2020     Pt will follow complex 2 step commands given verbal and visual commands with 60% accuracy independently. Goal Met- 10/16/2020    Pt will name objects with 80% accuracy given minimal cues across 3 sessions. Goal Met- 10/13/2020    Pt will name the category when given 3 words with 90% accuracy independently to improve word finding.    Progressing/Not Met 11/27/2020  Pt named the category given 3 words with 900% accuracy independently      Met x1    Pt will name the item when given 3 descriptors with 90% accuracy independently to improve word finding.    Progressing/Not Met 11/27/2020  Not addressed in today's session.    Pt will read a moderate paragraph and answer the multiple choice questions that follow with 90% accuracy independently. Goal Met 11/10/2020     Pt will spell 2 syllable words with 70% accuracy independently.    Progressing/Not Met 11/27/2020  Pt spelled 2 syllable words with 50% accuracy independently. 90% given moderate cues. Copy, Anagram, Recall Treatment (CART) was used to facilitate spelling accuracy when cues were needed.    Previously Met x1   Pt will participate in Verb Network Strength Training for 2-4 verbs per session with min A to improve word fluency.    Progressing/Not Met 11/27/2020  See data below     Verb Network Strength Training (VNEST)  focuses on verbs, encouraging people to think of people who perform actions (agents) and the objects or the actions that are performed on (patients). The idea is that by focusing on verbs, which require  connection to nouns, you can strengthen all the words in the mental network around the verb. In this therapy technique, there are 6 steps each verb is taken through.   Step 1: Pt provided 9/9 subjects and objects in verb triads IND'ly  Step 2: Pt read triads of words aloud with 80% acc independently.  Step 3: pt expanded where with 80% acc indly, when with 80% acc, and why with 70% acc indly.   Step 4: pt identified whether sentences presented auditorily were correct or incorrect with 80% acc IND'ly.   Step 5: pt recalled the target verb with 100% acc IND'ly (1/1 ).  Step 6: not addressed    Patient Education/Response:   Pt educated on: spelling strategies.    Written Home Exercises Provided: Patient instructed to cont prior HEP.  Exercises were reviewed and Agapito was able to demonstrate them prior to the end of the session.  Agapito demonstrated good  understanding of the education provided.     See EMR under Patient Instructions for exercises provided 10/16/2020.  Assessment:   Agapito is progressing well towards his goals. Pt continues to initiate conversation with more complex situations. Increased clarity of speech noted. Consider [facility] goals for oral language expansion and to supplement spelling. Current goals remain appropriate. Goals to be updated as necessary.     Pt prognosis is Good. Pt will continue to benefit from skilled outpatient speech and language therapy to address the deficits listed in the problem list on initial evaluation, provide pt/family education and to maximize pt's level of independence in the home and community environment.   Medical necessity is demonstrated by the following IMPAIRMENTS:  decreased content words and significant word finding difficulty in all situations severely limiting functional communication with both familiar and unfamiliar communication partners to relay medically and safety relevant information in a timely manner in a state of emergency.   Barriers to Therapy: None  Identified  Pt's spiritual, cultural and educational needs considered and pt agreeable to plan of care and goals.  Plan:   Continue POC with focus on rehabilitation and compensation for 4 domains of language.    Lisa Padilla CCC-SLP   11/27/2020

## 2020-12-01 ENCOUNTER — CLINICAL SUPPORT (OUTPATIENT)
Dept: REHABILITATION | Facility: HOSPITAL | Age: 75
End: 2020-12-01
Payer: MEDICARE

## 2020-12-01 DIAGNOSIS — R47.01 APHASIA: ICD-10-CM

## 2020-12-01 DIAGNOSIS — R48.2 APRAXIA OF SPEECH: ICD-10-CM

## 2020-12-01 PROCEDURE — 92507 TX SP LANG VOICE COMM INDIV: CPT | Mod: PN

## 2020-12-01 NOTE — PROGRESS NOTES
"Outpatient Neurological Rehabilitation   Speech and Language Therapy Treatment Note    Date:  12/1/2020     Name: Agapito Bass   MRN: [id number]   Therapy Diagnosis:   Encounter Diagnoses   Name Primary?   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  Response to previous treatment: Wife noted significant improvement since starting therapy mainly in functional oral communication  Pain Scale:  0/10 on VAS currently.   Pain Location: n/a  Objective:         UNTIMED  Procedure Min.   Speech- Language- Voice Therapy  50   Total Timed Units: 0  Total Untimed Units: 1  Charges Billed/# of units: 1    Short Term Goals: (6 weeks) Current Progress:   Pt will name 15 items in a concrete category in 60 seconds.    Progressing/ Not Met 12/1/2020   Not addressed in today's session.    Pt will name 10 items in a abstract category in 60 seconds.     Progressing/ Not Met 12/1/2020   Not addressed in today's session.     Met x2   Pt will participate in the speech production treatment hierarchy with monosyllabic /m/  Words.      Progressing/ Not Met 12/1/2020   Not addressed in today's session.     Met x1   Pt will participate in the speech production treatment hierarchy with monosyllabic /b/  Words.    Progressing/Not Met 12/1/2020  Not addressed in today's session.     Pt will participate in the " Combined Aphasia and Apraxia of Speech Treatment (CAAST) with 5 verbs per session to improve verbal fluency and speech intelligibility.      Progressing/ Not Met 12/1/2020   Not addressed in today's session.     Pt will complete Semantic Feature Analysis for 12 nouns with 80% accuracy independently across 2 sessions per noun.   Goal Met 11/13/2020     Pt will recall the spelling of 1 syllable words with 80% accuracy using Copy, Anagram, Recall Treatment (CART)  Protocol across 3 sessions.  Goal Met 11/6/2020     Pt will follow complex 2 step commands given verbal and visual commands with 60% accuracy independently. Goal Met- 10/16/2020    Pt will name objects with 80% accuracy given minimal cues across 3 sessions. Goal Met- 10/13/2020    Pt will name the category when given 3 words with 90% accuracy independently to improve word finding.    Progressing/Not Met 12/1/2020  Not addressed in today's session.     Met x1    Pt will name the item when given 3 descriptors with 90% accuracy independently to improve word finding.    Progressing/Not Met 12/1/2020  Not addressed in today's session.    Pt will read a moderate paragraph and answer the multiple choice questions that follow with 90% accuracy independently. Goal Met 11/10/2020     Pt will spell 2 syllable words with 70% accuracy independently.    Progressing/Not Met 12/1/2020  Not addressed in today's session.     Previously Met x1   Pt will participate in Verb Network Strength Training for 2-4 verbs per session with min A to improve word fluency.    Progressing/Not Met 12/1/2020  See data below.    Met x1     Verb Network Strength Training (VNEST)  focuses on verbs, encouraging people to think of people who perform actions (agents) and the objects or the actions that are performed on (patients). The idea is that by focusing on verbs, which require connection to nouns, you can strengthen all the words in the mental network around the verb. In this therapy technique,  "there are 6 steps each verb is taken through.   Step 1: Pt provided 9/9 subjects and objects in verb triads IND'ly  Step 2: Pt read triads of words aloud with 60% acc independently. Close approximations noted.  Step 3: pt expanded where with 90% acc indly, when with 80% acc, and why with 70% acc indly.   Step 4: pt identified whether sentences presented auditorily were correct or incorrect with 80% acc IND'ly.   Step 5: pt recalled the target verb with 100% acc IND'ly (1/1 ).  Step 6: not addressed    Patient Education/Response:   Pt educated on: strategies for engaging with family and self-advocating. Pt asked several questions regarding therapy "endiong on 12/18/2020". He stated he would like to continue in therapy as he noted significant progress to date. POC will be updated 12/18/2020.    Written Home Exercises Provided: Patient instructed to cont prior HEP.  Exercises were reviewed and Agapito was able to demonstrate them prior to the end of the session.  Agapito demonstrated good  understanding of the education provided.     See EMR under Patient Instructions for exercises provided 10/16/2020.  Assessment:   Agapito is progressing well towards his goals. Pt with escape behaviors noted in the session when topics became too complex. However, with demonstration, pt was able to convey messages. SLP demonstrated strategies for reestablishing communication when there is a breakdown. VNeST completed with increased independence. Current goals remain appropriate. Goals to be updated as necessary.     Pt prognosis is Good. Pt will continue to benefit from skilled outpatient speech and language therapy to address the deficits listed in the problem list on initial evaluation, provide pt/family education and to maximize pt's level of independence in the home and community environment.   Medical necessity is demonstrated by the following IMPAIRMENTS:  decreased content words and significant word finding difficulty in all situations " severely limiting functional communication with both familiar and unfamiliar communication partners to relay medically and safety relevant information in a timely manner in a state of emergency.   Barriers to Therapy: None Identified  Pt's spiritual, cultural and educational needs considered and pt agreeable to plan of care and goals.  Plan:   Continue POC with focus on rehabilitation and compensation for 4 domains of language.    Lisa Padilla CCC-SLP   12/1/2020

## 2020-12-04 ENCOUNTER — CLINICAL SUPPORT (OUTPATIENT)
Dept: REHABILITATION | Facility: HOSPITAL | Age: 75
End: 2020-12-04
Payer: MEDICARE

## 2020-12-04 DIAGNOSIS — R47.01 APHASIA: ICD-10-CM

## 2020-12-04 DIAGNOSIS — R48.2 APRAXIA OF SPEECH: ICD-10-CM

## 2020-12-04 PROCEDURE — 92507 TX SP LANG VOICE COMM INDIV: CPT | Mod: PN

## 2020-12-04 NOTE — PROGRESS NOTES
"Outpatient Neurological Rehabilitation   Speech and Language Therapy Treatment Note    Date:  12/4/2020     Name: Agapito Bass   MRN: [id number]   Therapy Diagnosis:   Encounter Diagnoses   Name Primary?   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 I liked it."  He was compliant with given HEP.  Response to previous treatment: Improved carryover of spelling from previous session  Pain Scale:  0/10 on VAS currently.   Pain Location: n/a  Objective:         UNTIMED  Procedure Min.   Speech- Language- Voice Therapy  50   Total Timed Units: 0  Total Untimed Units: 1  Charges Billed/# of units: 1    Short Term Goals: (6 weeks) Current Progress:   Pt will name 15 items in a concrete category in 60 seconds.    Progressing/ Not Met 12/4/2020   Pt named 9 items in a concrete category in 60 seconds. 15 items reached given extended time.   Pt will name 10 items in a abstract category in 60 seconds.     Progressing/ Not Met 12/4/2020   Pt named 7 items in an abstract category in 60 seconds. 10 items reaches given extended time.    Met x2   Pt will participate in the speech production treatment hierarchy with monosyllabic /m/  Words.      Progressing/ Not Met 12/4/2020   Not addressed in today's session.     Met x1   Pt will participate in the speech production treatment hierarchy with monosyllabic /b/  Words.    Progressing/Not Met " 12/4/2020  Not addressed in today's session.     Pt will participate in the Combined Aphasia and Apraxia of Speech Treatment (CAAST) with 5 verbs per session to improve verbal fluency and speech intelligibility.      Progressing/ Not Met 12/4/2020   Not addressed in today's session.     Pt will complete Semantic Feature Analysis for 12 nouns with 80% accuracy independently across 2 sessions per noun.   Goal Met 11/13/2020     Pt will recall the spelling of 1 syllable words with 80% accuracy using Copy, Anagram, Recall Treatment (CART)  Protocol across 3 sessions.  Goal Met 11/6/2020     Pt will follow complex 2 step commands given verbal and visual commands with 60% accuracy independently. Goal Met- 10/16/2020    Pt will name objects with 80% accuracy given minimal cues across 3 sessions. Goal Met- 10/13/2020    Pt will name the category when given 3 words with 90% accuracy independently to improve word finding.    Progressing/Not Met 12/4/2020  Pt named the category given 3 words with 90% accuracy independently .     Met x2    Pt will name the item when given 3 descriptors with 90% accuracy independently to improve word finding.    Progressing/Not Met 12/4/2020  Pt named the item given 3 descriptors with 80% accuracy independently, 100% given minimal cues.   Pt will read a moderate paragraph and answer the multiple choice questions that follow with 90% accuracy independently. Goal Met 11/10/2020     Pt will spell 2 syllable words with 70% accuracy independently.    Progressing/Not Met 12/4/2020  Pt spelled 2 syllable words with 70% accuracy independently, 90% accuracy given minimal cues    Previously Met x1   Pt will participate in Verb Network Strength Training for 2-4 verbs per session with min A to improve word fluency.    Progressing/Not Met 12/4/2020  Not addressed in today's session.     Met x1       Patient Education/Response:   Pt educated on need to participate more at home (ordering food at  [employer]).    Written Home Exercises Provided: Patient instructed to cont prior HEP.  Exercises were reviewed and Agapito was able to demonstrate them prior to the end of the session.  Agapito demonstrated good  understanding of the education provided.     See EMR under Patient Instructions for exercises provided 10/16/2020.  Assessment:   Agapito is progressing well towards his goals. Pt with increased independence in conversation. Increased word finding and strategy use noted across multiple sessions. Pt's confidence has increased and is willing to communicate more consistently with other people around the room. Current goals remain appropriate. Goals to be updated as necessary.     Pt prognosis is Good. Pt will continue to benefit from skilled outpatient speech and language therapy to address the deficits listed in the problem list on initial evaluation, provide pt/family education and to maximize pt's level of independence in the home and community environment.   Medical necessity is demonstrated by the following IMPAIRMENTS:  decreased content words and significant word finding difficulty in all situations severely limiting functional communication with both familiar and unfamiliar communication partners to relay medically and safety relevant information in a timely manner in a state of emergency.   Barriers to Therapy: None Identified  Pt's spiritual, cultural and educational needs considered and pt agreeable to plan of care and goals.  Plan:   Continue POC with focus on rehabilitation and compensation for 4 domains of language.    Lias Padilla CCC-SLP   12/4/2020

## 2020-12-07 ENCOUNTER — CLINICAL SUPPORT (OUTPATIENT)
Dept: REHABILITATION | Facility: HOSPITAL | Age: 75
End: 2020-12-07
Payer: MEDICARE

## 2020-12-07 DIAGNOSIS — R48.2 APRAXIA OF SPEECH: ICD-10-CM

## 2020-12-07 DIAGNOSIS — R47.01 APHASIA: ICD-10-CM

## 2020-12-07 PROCEDURE — 92507 TX SP LANG VOICE COMM INDIV: CPT | Mod: PN

## 2020-12-07 NOTE — PROGRESS NOTES
"Outpatient Neurological Rehabilitation   Speech and Language Therapy Treatment Note    Date:  12/7/2020     Name: Agapito Bass   MRN: [id number]   Therapy Diagnosis:   Encounter Diagnoses   Name Primary?   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  Response to previous treatment: Improved topic maintenance and tolerance as compared to previous sessions  Pain Scale:  0/10 on VAS currently.   Pain Location: n/a  Objective:         UNTIMED  Procedure Min.   Speech- Language- Voice Therapy  50   Total Timed Units: 0  Total Untimed Units: 1  Charges Billed/# of units: 1    Short Term Goals: (6 weeks) Current Progress:   Pt will name 15 items in a concrete category in 60 seconds.    Progressing/ Not Met 12/7/2020   Not addressed in today's session.    Pt will name 10 items in a abstract category in 60 seconds.     Progressing/ Not Met 12/7/2020   Not addressed in today's session.     Met x2   Pt will participate in the speech production treatment hierarchy with monosyllabic /m/  Words.      Progressing/ Not Met 12/7/2020   Not addressed in today's session.     Met x1   Pt will participate in the speech production treatment hierarchy with monosyllabic /b/  Words.    Progressing/Not Met 12/7/2020  Not addressed in today's session.     Pt will participate in the Combined Aphasia and Apraxia of Speech " Treatment (CAAST) with 5 verbs per session to improve verbal fluency and speech intelligibility.      Progressing/ Not Met 12/7/2020   Not addressed in today's session.     Pt will complete Semantic Feature Analysis for 12 nouns with 80% accuracy independently across 2 sessions per noun.   Goal Met 11/13/2020     Pt will recall the spelling of 1 syllable words with 80% accuracy using Copy, Anagram, Recall Treatment (CART)  Protocol across 3 sessions.  Goal Met 11/6/2020     Pt will follow complex 2 step commands given verbal and visual commands with 60% accuracy independently. Goal Met- 10/16/2020    Pt will name objects with 80% accuracy given minimal cues across 3 sessions. Goal Met- 10/13/2020    Pt will name the category when given 3 words with 90% accuracy independently to improve word finding.    Progressing/Not Met 12/7/2020  Not addressed in today's session. .     Met x2    Pt will name the item when given 3 descriptors with 90% accuracy independently to improve word finding.    Progressing/Not Met 12/7/2020  Not addressed in today's session.    Pt will read a moderate paragraph and answer the multiple choice questions that follow with 90% accuracy independently. Goal Met 11/10/2020     Pt will spell 2 syllable words with 70% accuracy independently.    Progressing/Not Met 12/7/2020  Pt spelled 2 syllable words with 60% accuracy independently, 90% accuracy given moderate cues    Previously Met x1   Pt will participate in Verb Network Strength Training for 2-4 verbs per session with min A to improve word fluency.    Progressing/Not Met 12/7/2020  Verb Network Strength Training (VNEST)  focuses on verbs, encouraging people to think of people who perform actions (agents) and the objects or the actions that are performed on (patients). The idea is that by focusing on verbs, which require connection to nouns, you can strengthen all the words in the mental network around the verb. In this therapy technique, there  are 6 steps each verb is taken through.   Step 1: Pt provided 7/9  subjects and objects in verb triads IND'ly, 9/9  given cues.   Step 2: Pt read triads of words aloud with 50 % acc independently.  Step 3: pt expanded where with 66% acc indly, 100% acc given moderate cues, when with 66% acc, 100% acc given moderate cues, and why with 66% acc indly, 100% acc given moderate cues.   Step 4: pt identified whether sentences presented auditorily were correct or incorrect with 90% acc IND'ly.        Met x1       Patient Education/Response:   Pt educated on progress to date and POC.    Written Home Exercises Provided: Patient instructed to cont prior HEP.  Exercises were reviewed and Agapito was able to demonstrate them prior to the end of the session.  Agapito demonstrated good  understanding of the education provided.     See EMR under Patient Instructions for exercises provided 10/16/2020.  Assessment:   Agapito is progressing well towards his goals. Pt with increased independence in conversation. Increased word finding and strategy use noted across multiple sessions. Pt has consistent been drawing the first letter in the air or on a tablet to cue word finding. Improvement continues to be noted. Current goals remain appropriate. Goals to be updated as necessary.     Pt prognosis is Good. Pt will continue to benefit from skilled outpatient speech and language therapy to address the deficits listed in the problem list on initial evaluation, provide pt/family education and to maximize pt's level of independence in the home and community environment.   Medical necessity is demonstrated by the following IMPAIRMENTS:  decreased content words and significant word finding difficulty in all situations severely limiting functional communication with both familiar and unfamiliar communication partners to relay medically and safety relevant information in a timely manner in a state of emergency.   Barriers to Therapy: None Identified  Pt's  spiritual, cultural and educational needs considered and pt agreeable to plan of care and goals.  Plan:   Continue POC with focus on rehabilitation and compensation for 4 domains of language.    Lisa Padilla CCC-SLP   12/7/2020

## 2020-12-11 ENCOUNTER — CLINICAL SUPPORT (OUTPATIENT)
Dept: REHABILITATION | Facility: HOSPITAL | Age: 75
End: 2020-12-11
Payer: MEDICARE

## 2020-12-11 DIAGNOSIS — R47.01 APHASIA: ICD-10-CM

## 2020-12-11 DIAGNOSIS — R48.2 APRAXIA OF SPEECH: ICD-10-CM

## 2020-12-11 PROCEDURE — 92507 TX SP LANG VOICE COMM INDIV: CPT | Mod: PN

## 2020-12-11 NOTE — PROGRESS NOTES
"Outpatient Neurological Rehabilitation   Speech and Language Therapy Treatment Note    Date:  12/11/2020     Name: Agapito Bass   MRN: [id number]   Therapy Diagnosis:   Encounter Diagnoses   Name Primary?   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  Response to previous treatment: spelling noted  Pain Scale:  0/10 on VAS currently.   Pain Location: n/a  Objective:         UNTIMED  Procedure Min.   Speech- Language- Voice Therapy  45   Total Timed Units: 0  Total Untimed Units: 1  Charges Billed/# of units: 1    Short Term Goals: (6 weeks) Current Progress:   Pt will name 15 items in a concrete category in 60 seconds.    Progressing/ Not Met 12/11/2020   Pt named 11 items in a concrete category in 60 seconds.    Pt will name 10 items in a abstract category in 60 seconds.  Pt named 10 items in an abstract category.     Goal Met 12/11/2020     Pt will participate in the speech production treatment hierarchy with monosyllabic /m/  Words.      Progressing/ Not Met 12/11/2020   Not addressed in today's session.     Met x1   Pt will participate in the speech production treatment hierarchy with monosyllabic /b/  Words.    Progressing/Not Met 12/11/2020  Not addressed in today's session.     Pt will participate in the Combined Aphasia and Apraxia of Speech Treatment (CAAST) with 5 verbs per " session to improve verbal fluency and speech intelligibility.      Progressing/ Not Met 12/11/2020   Not addressed in today's session.     Pt will complete Semantic Feature Analysis for 12 nouns with 80% accuracy independently across 2 sessions per noun.   Goal Met 11/13/2020     Pt will recall the spelling of 1 syllable words with 80% accuracy using Copy, Anagram, Recall Treatment (CART)  Protocol across 3 sessions.  Goal Met 11/6/2020     Pt will follow complex 2 step commands given verbal and visual commands with 60% accuracy independently. Goal Met- 10/16/2020    Pt will name objects with 80% accuracy given minimal cues across 3 sessions. Goal Met- 10/13/2020    Pt will name the category when given 3 words with 90% accuracy independently to improve word finding. Pt named the category with 90% accuracy independently.      Goal Met 12/11/2020     Pt will name the item when given 3 descriptors with 90% accuracy independently to improve word finding.    Progressing/Not Met 12/11/2020  Not addressed in today's session.    Pt will read a moderate paragraph and answer the multiple choice questions that follow with 90% accuracy independently. Goal Met 11/10/2020     Pt will spell 2 syllable words with 70% accuracy independently.    Progressing/Not Met 12/11/2020  Pt spelled 2 syllable words with 60% accuracy independently, 90% accuracy given moderate cues    Previously Met x1   Pt will participate in Verb Network Strength Training for 2-4 verbs per session with min A to improve word fluency.    Progressing/Not Met 12/11/2020  Not addressed in today's session.     Met x1       Patient Education/Response:   Pt educated on progress to date and POC.    Written Home Exercises Provided: Patient instructed to cont prior HEP.  Exercises were reviewed and Agapito was able to demonstrate them prior to the end of the session.  Agapito demonstrated good  understanding of the education provided.     See EMR under Patient Instructions for  exercises provided 10/16/2020.  Assessment:   Agapito is progressing well towards his goals. Pt with increased independence in conversation. Increased word finding and strategy use noted across multiple sessions. Pt has consistent been drawing the first letter in the air or on a tablet to cue word finding. Improvement continues to be noted. Current goals remain appropriate. Goals to be updated as necessary.     Pt prognosis is Good. Pt will continue to benefit from skilled outpatient speech and language therapy to address the deficits listed in the problem list on initial evaluation, provide pt/family education and to maximize pt's level of independence in the home and community environment.   Medical necessity is demonstrated by the following IMPAIRMENTS:  decreased content words and significant word finding difficulty in all situations severely limiting functional communication with both familiar and unfamiliar communication partners to relay medically and safety relevant information in a timely manner in a state of emergency.   Barriers to Therapy: None Identified  Pt's spiritual, cultural and educational needs considered and pt agreeable to plan of care and goals.  Plan:   Continue POC with focus on rehabilitation and compensation for 4 domains of language.    Lisa Padilla CCC-SLP   12/11/2020

## 2020-12-11 NOTE — PLAN OF CARE
Outpatient Rehabilitation Therapy  Updated POC     Date: 12/11/2020   Name: Agapito Bass  Clinic Number: [id number]    Therapy Diagnosis:   Encounter Diagnoses   Name Primary?   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 Pt has attempted more complex conversational topics since therapy began and is beginning to successfully use strategies in conversation. Pt benefits significantly from the use of a boogie board to supplement oral expression deficits. Functional communication has improved significantly and patient's wife reports a noticeable change at home. Pt continues to display deficits which warrant skilled ST services.    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 Demonstrates impairments including limitations as described in the problem list. Positive prognostic factors include progress to date and patient's participation in the session. Negative prognostic factors include Time since onset. He presents with broca's aphasia c/b decreased content words per utterances, short labored phrases, and high level word finding deficits. Pt also presents with apraxia of speech No barriers to therapy identified.. Patient will benefit from skilled, outpatient neurological rehabilitation speech therapy.    Rehab Potential: good      Education: Plan of Care, role of SLP in care, scheduling/ cancellation policy and insurance limitations / visit limit  were discussed with the patient. Pt and family verbalized understanding.     Previous Short Term Goals   Short Term Goals: (6 weeks) Current Progress:   Pt will name 15 items in a concrete category in 60 seconds. Progressing/Not Met    Pt will name 10 items in a abstract category in 60 seconds.  Goal Met 12/11/2020     Pt will participate in the speech production treatment hierarchy with monosyllabic /m/  Words.  Met in 1 of 3 sessions.   Pt will participate in the speech production treatment hierarchy with monosyllabic /b/  Words. Progressing/Not Met     Pt will participate in the Combined Aphasia and Apraxia of Speech Treatment (CAAST) with 5 verbs per session to improve verbal fluency and speech intelligibility.   Progressing/Not Met    Pt will complete Semantic Feature Analysis for 12 nouns with 80% accuracy independently across 2 sessions per noun.   Goal Met 11/13/2020     Pt will recall the spelling of 1 syllable words with 80% accuracy using Copy, Anagram, Recall Treatment (CART)  Protocol across 3 sessions.  Goal Met 11/6/2020     Pt will follow complex 2 step commands given verbal and visual commands with 60% accuracy independently. Goal Met- 10/16/2020    Pt will name objects with 80% accuracy given minimal cues across 3 sessions. Goal Met- 10/13/2020    Pt will name the category when given 3 words with 90% accuracy independently to improve word finding. Goal Met 12/11/2020     Pt will name the item when given 3 descriptors with 90% accuracy independently to improve word finding. Progressing/Not Met    Pt will read a moderate paragraph and answer the multiple choice questions that follow with 90% accuracy independently. Goal Met 11/10/2020     Pt will spell 2 syllable words with 70% accuracy independently. Met in 1 of 3 sessions   Pt will participate in Verb Network Strength Training  for 2-4 verbs per session with min A to improve word fluency. Met in 1 of 3 sessions     New Short Term Goals: 8 weeks  1. Continue all previously unmet goals  2. Pt will verbalize 2-3 word finding strategies given minimal cues.  3. Pt will utilize word finding strategies in conversation independently across 3 sessions.      Long Term Goal Status:  12 weeks  1. He  will develop functional cognitive-linguistic based skills and utilize compensatory strategies to communicate wants and needs effectively to different conversational partners, maintain safety, and participate socially in functional living environment.  -Progressing/Not Met   2. Pt will comprehend communication related to basic medical and social needs and utilize compensatory strategies to maintain safety and to participate socially in functional living environment.    -Goal Met  3. He  will develop appropriate writing skills related to routine daily activities and utilize compensatory strategies to communicate basic medical wants and needs in functional environment.   -Progressing/Not Met   4. He  will develop functional reading skills and utilize compensatory strategies to maintain safety during ADL's and read and understand moderate adult material independently  -Progressing/Not Met     Goals Previously Met:  First POC     Reasons for Recertification of Therapy: Pt continues to make progress and patient continues to demonstrate deficits which warrant skilled ST.      Plan     Updated Certification Period: 12/11/2020 to 3/5/2020  Recommended Treatment Plan: Patient will participate in the [facility] program for speech therapy 2 times per week to address his  Communication deficits, to educate patient and their family, and to participate in a home exercise program.    Therapist's Name:  Lisa Padilla CCC-SLP   12/11/2020      I CERTIFY THE NEED FOR THESE SERVICES FURNISHED UNDER THIS PLAN OF TREATMENT AND WHILE UNDER MY  CARE      Physician Name: _______________________________    Physician Signature: ____________________________

## 2020-12-15 ENCOUNTER — CLINICAL SUPPORT (OUTPATIENT)
Dept: REHABILITATION | Facility: HOSPITAL | Age: 75
End: 2020-12-15
Payer: MEDICARE

## 2020-12-15 DIAGNOSIS — R47.01 APHASIA: ICD-10-CM

## 2020-12-15 DIAGNOSIS — R48.2 APRAXIA OF SPEECH: ICD-10-CM

## 2020-12-15 PROCEDURE — 92507 TX SP LANG VOICE COMM INDIV: CPT | Mod: PN

## 2020-12-15 NOTE — PROGRESS NOTES
"  Outpatient Neurological Rehabilitation   Speech and Language Therapy Treatment Note    Date:  12/15/2020     Name: Agapito Bass   MRN: [id number]   Therapy Diagnosis:   Encounter Diagnoses   Name Primary?   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  Response to previous treatment: Improvement noted in conversation.  Pain Scale:  0/10 on VAS currently.   Pain Location: n/a  Objective:         UNTIMED  Procedure Min.   Speech- Language- Voice Therapy  45   Total Timed Units: 0  Total Untimed Units: 1  Charges Billed/# of units: 1    Short Term Goals: (6 weeks) Current Progress:   Pt will name 15 items in a concrete category in 60 seconds.    Progressing/ Not Met 12/15/2020   Pt named 7 items in a concrete category in 60 seconds. 15 achieved with extended time.   Pt will participate in the speech production treatment hierarchy with monosyllabic /m/  Words.      Progressing/ Not Met 12/15/2020   Not addressed in today's session.     Met x1   Pt will participate in the speech production treatment hierarchy with monosyllabic /b/  Words.    Progressing/Not Met 12/15/2020  Not addressed in today's session.     Pt will participate in the Combined Aphasia and Apraxia of Speech Treatment (CAAST) with 5 verbs per session to improve verbal fluency and speech intelligibility.      Progressing/ Not Met " 12/15/2020   Not addressed in today's session.      Pt will name the item when given 3 descriptors with 90% accuracy independently to improve word finding.    Progressing/Not Met 12/15/2020  Pt completed a convergent naming task with 83% accuracy independently.   Pt will spell 2 syllable words with 70% accuracy independently.    Progressing/Not Met 12/15/2020  Pt spelled 2 syllable words with 62.5% accuracy independently, 100% accuracy given moderate cues    Previously Met x1   Pt will participate in Verb Network Strength Training for 2-4 verbs per session with min A to improve word fluency.    Progressing/Not Met 12/15/2020  Pt participated in Verb Network Strength Training for 2 verbs with minimal assist.    Met x2   Pt will verbalize 2-3 word finding strategies given minimal cues. Not addressed in today's session.    Pt will utilize word finding strategies in conversation independently across 3 sessions.  Pt utilized word finding strategies (writing and first letter) independently.    Met x1     Patient Education/Response:   Pt educated on intentional use of word finding strategies. Pt verbalized understanding.    Written Home Exercises Provided: Patient instructed to cont prior HEP.  Exercises were reviewed and Agapito was able to demonstrate them prior to the end of the session.  Agapito demonstrated good  understanding of the education provided.     See EMR under Patient Instructions for exercises provided 10/16/2020.  Assessment:   Agapito is progressing well towards his goals. Pt with increased independence in conversation. Spelling increased accuracy and functional communication. Pt with increased confidence with communication as noted in the conversation. Current goals remain appropriate. Goals to be updated as necessary.     Pt prognosis is Good. Pt will continue to benefit from skilled outpatient speech and language therapy to address the deficits listed in the problem list on initial evaluation, provide  pt/family education and to maximize pt's level of independence in the home and community environment.   Medical necessity is demonstrated by the following IMPAIRMENTS:  decreased content words and significant word finding difficulty in all situations severely limiting functional communication with both familiar and unfamiliar communication partners to relay medically and safety relevant information in a timely manner in a state of emergency.   Barriers to Therapy: None Identified  Pt's spiritual, cultural and educational needs considered and pt agreeable to plan of care and goals.  Plan:   Continue POC with focus on rehabilitation and compensation for 4 domains of language.    Lisa Padilla CCC-SLP   12/15/2020

## 2020-12-18 ENCOUNTER — CLINICAL SUPPORT (OUTPATIENT)
Dept: REHABILITATION | Facility: HOSPITAL | Age: 75
End: 2020-12-18
Payer: MEDICARE

## 2020-12-18 DIAGNOSIS — R47.01 APHASIA: ICD-10-CM

## 2020-12-18 DIAGNOSIS — R48.2 APRAXIA OF SPEECH: ICD-10-CM

## 2020-12-18 PROCEDURE — 92507 TX SP LANG VOICE COMM INDIV: CPT | Mod: PN

## 2020-12-18 NOTE — PROGRESS NOTES
Outpatient Neurological Rehabilitation   Speech and Language Therapy Treatment Note    Date:  12/18/2020     Name: Agapito Bass   MRN: [id number]   Therapy Diagnosis:   Encounter Diagnoses   Name Primary?   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  Response to previous treatment: Improvement noted in conversation-continued  Pain Scale:  0/10 on VAS currently.   Pain Location: n/a  Objective:         UNTIMED  Procedure Min.   Speech- Language- Voice Therapy  45   Total Timed Units: 0  Total Untimed Units: 1  Charges Billed/# of units: 1    Short Term Goals: (6 weeks) Current Progress:   Pt will name 15 items in a concrete category in 60 seconds.    Progressing/ Not Met 12/18/2020   Not addressed in today's session.    Pt will participate in the speech production treatment hierarchy with monosyllabic /m/  Words.      Progressing/ Not Met 12/18/2020   Not addressed in today's session.     Met x1   Pt will participate in the speech production treatment hierarchy with monosyllabic /b/  Words.    Progressing/Not Met 12/18/2020  Not addressed in today's session.     Pt will participate in the Combined Aphasia and Apraxia of Speech Treatment (CAAST) with 5 verbs per session to improve verbal fluency and speech intelligibility.      Progressing/ Not Met 12/18/2020   Not addressed in  today's session.      Pt will name the item when given 3 descriptors with 90% accuracy independently to improve word finding.    Progressing/Not Met 12/18/2020  Pt completed a convergent naming task with 90% accuracy independently.    Met x1   Pt will spell 2 syllable words with 70% accuracy independently.    Progressing/Not Met 12/18/2020  Not addressed in today's session.     Previously Met x1   Pt will participate in Verb Network Strength Training for 2-4 verbs per session with min A to improve word fluency. Pt participated in Verb Network Strength Training for 2 verbs with minimal assist.    Goal Met 12/18/2020     Pt will verbalize 2-3 word finding strategies given minimal cues. Not addressed in today's session.    Pt will utilize word finding strategies in conversation independently across 3 sessions.  Pt utilized word finding strategies (writing and first letter) independently.    Met x2     Patient Education/Response:   Pt educated on intentional use of word finding strategies (contnued and demonstrated). Pt verbalized understanding.    Written Home Exercises Provided: Patient instructed to cont prior HEP.  Exercises were reviewed and Agapito was able to demonstrate them prior to the end of the session.  Agapito demonstrated good  understanding of the education provided.     See EMR under Patient Instructions for exercises provided 10/16/2020.  Assessment:   Agapito is progressing well towards his goals. Pt with increased independence in conversation. Complexity of conversation continues to increase. Excellent compensatory strategies for apraxia utilized. Current goals remain appropriate. Goals to be updated as necessary.     Pt prognosis is Good. Pt will continue to benefit from skilled outpatient speech and language therapy to address the deficits listed in the problem list on initial evaluation, provide pt/family education and to maximize pt's level of independence in the home and community environment.   Medical  necessity is demonstrated by the following IMPAIRMENTS:  decreased content words and significant word finding difficulty in all situations severely limiting functional communication with both familiar and unfamiliar communication partners to relay medically and safety relevant information in a timely manner in a state of emergency.   Barriers to Therapy: None Identified  Pt's spiritual, cultural and educational needs considered and pt agreeable to plan of care and goals.  Plan:   Continue POC with focus on rehabilitation and compensation for 4 domains of language.    Lisa Padilla CCC-SLP   12/18/2020

## 2020-12-21 ENCOUNTER — CLINICAL SUPPORT (OUTPATIENT)
Dept: REHABILITATION | Facility: HOSPITAL | Age: 75
End: 2020-12-21
Payer: MEDICARE

## 2020-12-21 DIAGNOSIS — R48.2 APRAXIA OF SPEECH: ICD-10-CM

## 2020-12-21 DIAGNOSIS — R47.01 APHASIA: ICD-10-CM

## 2020-12-21 PROCEDURE — 92507 TX SP LANG VOICE COMM INDIV: CPT | Mod: PN

## 2020-12-21 NOTE — PROGRESS NOTES
"  Outpatient Neurological Rehabilitation   Speech and Language Therapy Treatment Note    Date:  12/21/2020     Name: Agapito Bass   MRN: [id number]   Therapy Diagnosis:   Encounter Diagnoses   Name Primary?   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  Response to previous treatment: Improvement noted in conversation-continued  Pain Scale:  0/10 on VAS currently.   Pain Location: n/a  Objective:         UNTIMED  Procedure Min.   Speech- Language- Voice Therapy  45   Total Timed Units: 0  Total Untimed Units: 1  Charges Billed/# of units: 1    Short Term Goals: (6 weeks) Current Progress:   Pt will name 15 items in a concrete category in 60 seconds.    Progressing/ Not Met 12/21/2020   Pt named 9 items in a concrete category in 60 seconds.     Pt will participate in the speech production treatment hierarchy with monosyllabic /m/  Words.      Progressing/ Not Met 12/21/2020   Not addressed in today's session.     Met x1   Pt will participate in the speech production treatment hierarchy with monosyllabic /b/  Words.    Progressing/Not Met 12/21/2020  Not addressed in today's session.     Pt will participate in the Combined Aphasia and Apraxia of Speech Treatment (CAAST) with 5 verbs per session to improve verbal fluency and speech intelligibility.  "     Progressing/ Not Met 12/21/2020   Not addressed in today's session.      Pt will name the item when given 3 descriptors with 90% accuracy independently to improve word finding.    Progressing/Not Met 12/21/2020  Pt completed a convergent naming task with 80% accuracy independently.  Extended time required.     Met x1   Pt will spell 2 syllable words with 70% accuracy independently.    Progressing/Not Met 12/21/2020  PT spelled 2 syllable words with 50% accuracy independently .     Previously Met x1   Pt will participate in Verb Network Strength Training for 2-4 verbs per session with min A to improve word fluency. Goal Met 12/18/2020     Pt will verbalize 2-3 word finding strategies given minimal cues.    Progressing/Not Met 12/21/2020  Not addressed in today's session.    Pt will utilize word finding strategies in conversation independently across 3 sessions.     Progressing/Not Met 12/21/2020  Pt utilized word finding strategies (writing and first letter) given minimal cues.    Met x2     Patient Education/Response:   Pt educated on spelling strategies. Pt verbalized understanding.    Written Home Exercises Provided: Patient instructed to cont prior HEP.  Exercises were reviewed and Agapito was able to demonstrate them prior to the end of the session.  Agapito demonstrated good  understanding of the education provided.     See EMR under Patient Instructions for exercises provided 10/16/2020.  Assessment:   Agapito is progressing well towards his goals.Pt continues to display deficits associated with apraxia and aphasia. Excellent use of writing to aid in word finding. Good strategy use throughout the session.  Current goals remain appropriate. Goals to be updated as necessary.     Pt prognosis is Good. Pt will continue to benefit from skilled outpatient speech and language therapy to address the deficits listed in the problem list on initial evaluation, provide pt/family education and to maximize pt's level of  independence in the home and community environment.   Medical necessity is demonstrated by the following IMPAIRMENTS:  decreased content words and significant word finding difficulty in all situations severely limiting functional communication with both familiar and unfamiliar communication partners to relay medically and safety relevant information in a timely manner in a state of emergency.   Barriers to Therapy: None Identified  Pt's spiritual, cultural and educational needs considered and pt agreeable to plan of care and goals.  Plan:   Continue POC with focus on rehabilitation and compensation for 4 domains of language.    Lisa Padilla CCC-SLP   12/21/2020

## 2020-12-23 ENCOUNTER — CLINICAL SUPPORT (OUTPATIENT)
Dept: REHABILITATION | Facility: HOSPITAL | Age: 75
End: 2020-12-23
Payer: MEDICARE

## 2020-12-23 DIAGNOSIS — R47.01 APHASIA: ICD-10-CM

## 2020-12-23 DIAGNOSIS — R48.2 APRAXIA OF SPEECH: ICD-10-CM

## 2020-12-23 PROCEDURE — 92507 TX SP LANG VOICE COMM INDIV: CPT | Mod: PN

## 2020-12-23 NOTE — PROGRESS NOTES
Outpatient Neurological Rehabilitation   Speech and Language Therapy Treatment Note    Date:  12/23/2020     Name: Agapito Bass   MRN: [id number]   Therapy Diagnosis:   Encounter Diagnoses   Name Primary?   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  Response to previous treatment: Improvement noted in conversation-continued  Pain Scale:  0/10 on VAS currently.   Pain Location: n/a  Objective:         UNTIMED  Procedure Min.   Speech- Language- Voice Therapy  45   Total Timed Units: 0  Total Untimed Units: 1  Charges Billed/# of units: 1    Short Term Goals: (6 weeks) Current Progress:   Pt will name 15 items in a concrete category in 60 seconds.    Progressing/ Not Met 12/23/2020   Pt named 15 items in a concrete category in 60 seconds.    Met x1   Pt will participate in the speech production treatment hierarchy with monosyllabic /m/  Words.      Progressing/ Not Met 12/23/2020   Not addressed in today's session.     Met x1   Pt will participate in the speech production treatment hierarchy with monosyllabic /b/  Words.    Progressing/Not Met 12/23/2020  Not addressed in today's session.     Pt will participate in the Combined Aphasia and Apraxia of Speech Treatment (CAAST) with 5 verbs per session to improve verbal fluency and speech intelligibility.      Progressing/ Not  Met 12/23/2020   Not addressed in today's session.      Pt will name the item when given 3 descriptors with 90% accuracy independently to improve word finding.    Progressing/Not Met 12/23/2020  Pt completed a convergent naming task with 75% accuracy independently.    Met x1   Pt will spell 2 syllable words with 70% accuracy independently.    Progressing/Not Met 12/23/2020  Pt spelled 2 syllable words with 33% accuracy independently, 100% given moderate cues.     Previously Met x1   Pt will participate in Verb Network Strength Training for 2-4 verbs per session with min A to improve word fluency. Goal Met 12/18/2020     Pt will verbalize 2-3 word finding strategies given minimal cues.    Progressing/Not Met 12/23/2020  Not addressed in today's session.    Pt will utilize word finding strategies in conversation independently across 3 sessions.     Progressing/Not Met 12/23/2020  Pt utilized word finding strategies (writing and first letter) given min cues.     Met x2   GOAL ADDED: Pt will participate in Verb Network Strength Training for 2-4 verbs per session independently to improve word fluency.    Progressing/Not Met 12/23/2020  Not addressed in today's session.    GOAL ADDED: Pt will name 1 synonym for a given word with 80% accuracy independently to address high level word finding deficits.    Progressing/Not Met 12/23/2020  Pt named 1 synonym for a given word with 50% accuracy independently.     Patient Education/Response:   Pt educated on strategies for communication with family at [place]. Pt verbalized understanding.    Written Home Exercises Provided: Patient instructed to cont prior HEP.  Exercises were reviewed and Agapito was able to demonstrate them prior to the end of the session.  Agapito demonstrated good  understanding of the education provided.     See EMR under Patient Instructions for exercises provided 10/16/2020.  Assessment:   Agapito is progressing well towards his goals. Pt with increased  independence in conversation.Progress on several goals above. Good participation in study. Excellent compensatory strategies for apraxia utilized. Current goals remain appropriate. Goals to be updated as necessary.     Pt prognosis is Good. Pt will continue to benefit from skilled outpatient speech and language therapy to address the deficits listed in the problem list on initial evaluation, provide pt/family education and to maximize pt's level of independence in the home and community environment.   Medical necessity is demonstrated by the following IMPAIRMENTS:  decreased content words and significant word finding difficulty in all situations severely limiting functional communication with both familiar and unfamiliar communication partners to relay medically and safety relevant information in a timely manner in a state of emergency.   Barriers to Therapy: None Identified  Pt's spiritual, cultural and educational needs considered and pt agreeable to plan of care and goals.  Plan:   Continue POC with focus on rehabilitation and compensation for 4 domains of language.    Lisa Padilla CCC-SLP   12/23/2020

## 2020-12-28 ENCOUNTER — CLINICAL SUPPORT (OUTPATIENT)
Dept: REHABILITATION | Facility: HOSPITAL | Age: 75
End: 2020-12-28
Payer: MEDICARE

## 2020-12-28 DIAGNOSIS — R48.2 APRAXIA OF SPEECH: ICD-10-CM

## 2020-12-28 DIAGNOSIS — R47.01 APHASIA: ICD-10-CM

## 2020-12-28 PROCEDURE — 92507 TX SP LANG VOICE COMM INDIV: CPT | Mod: PN

## 2020-12-28 RX ORDER — PRAVASTATIN SODIUM 40 MG/1
TABLET ORAL
COMMUNITY
End: 2020-12-29 | Stop reason: SDUPTHER

## 2020-12-28 RX ORDER — FLUOROURACIL 50 MG/G
CREAM TOPICAL
COMMUNITY
Start: 2020-09-22 | End: 2021-07-01

## 2020-12-28 RX ORDER — DOCUSATE SODIUM 100 MG/1
100 CAPSULE, LIQUID FILLED ORAL
COMMUNITY
End: 2023-08-08 | Stop reason: SDUPTHER

## 2020-12-28 RX ORDER — METFORMIN HYDROCHLORIDE 500 MG/1
TABLET ORAL
COMMUNITY
Start: 2020-10-25 | End: 2020-12-29

## 2020-12-28 RX ORDER — TERAZOSIN 2 MG/1
CAPSULE ORAL
COMMUNITY
Start: 2020-11-03 | End: 2021-03-30 | Stop reason: SDUPTHER

## 2020-12-28 RX ORDER — HYDROXYZINE HYDROCHLORIDE 25 MG/1
TABLET, FILM COATED ORAL
COMMUNITY
End: 2020-12-29 | Stop reason: SDUPTHER

## 2020-12-28 RX ORDER — METOPROLOL TARTRATE 100 MG/1
TABLET ORAL
COMMUNITY
Start: 2020-11-03 | End: 2021-03-30 | Stop reason: SDUPTHER

## 2020-12-28 RX ORDER — METOPROLOL TARTRATE 100 MG/1
TABLET ORAL
COMMUNITY
End: 2020-12-29 | Stop reason: SDUPTHER

## 2020-12-28 RX ORDER — DILTIAZEM HYDROCHLORIDE 60 MG/1
TABLET, FILM COATED ORAL
COMMUNITY
End: 2020-12-29 | Stop reason: SDUPTHER

## 2020-12-28 RX ORDER — DILTIAZEM HYDROCHLORIDE 60 MG/1
TABLET, FILM COATED ORAL
COMMUNITY
Start: 2020-12-11 | End: 2021-03-30 | Stop reason: SDUPTHER

## 2020-12-28 RX ORDER — QUETIAPINE FUMARATE 50 MG/1
TABLET, FILM COATED ORAL
COMMUNITY
End: 2020-12-29 | Stop reason: SDUPTHER

## 2020-12-28 RX ORDER — CANDESARTAN 8 MG/1
TABLET ORAL
COMMUNITY
Start: 2020-11-03 | End: 2021-03-30 | Stop reason: SDUPTHER

## 2020-12-28 RX ORDER — GLIMEPIRIDE 2 MG/1
TABLET ORAL
COMMUNITY
Start: 2020-11-03 | End: 2021-01-04 | Stop reason: SDUPTHER

## 2020-12-28 RX ORDER — FUROSEMIDE 20 MG/1
TABLET ORAL
COMMUNITY
End: 2020-12-29 | Stop reason: SDUPTHER

## 2020-12-28 RX ORDER — APIXABAN 5 MG/1
TABLET, FILM COATED ORAL
COMMUNITY
Start: 2020-12-04 | End: 2023-08-08 | Stop reason: SDUPTHER

## 2020-12-28 RX ORDER — TERAZOSIN 2 MG/1
CAPSULE ORAL
COMMUNITY
End: 2020-12-29 | Stop reason: SDUPTHER

## 2020-12-28 RX ORDER — CANDESARTAN 8 MG/1
TABLET ORAL
COMMUNITY
End: 2020-12-29 | Stop reason: SDUPTHER

## 2020-12-28 RX ORDER — GLIMEPIRIDE 2 MG/1
TABLET ORAL
COMMUNITY
End: 2020-12-29 | Stop reason: SDUPTHER

## 2020-12-28 RX ORDER — FLUOROURACIL 50 MG/G
CREAM TOPICAL
COMMUNITY
End: 2020-12-29 | Stop reason: SDUPTHER

## 2020-12-28 RX ORDER — METFORMIN HYDROCHLORIDE 500 MG/1
TABLET ORAL
COMMUNITY
End: 2020-12-29 | Stop reason: SDUPTHER

## 2020-12-28 RX ORDER — HYDROXYZINE HYDROCHLORIDE 25 MG/1
TABLET, FILM COATED ORAL
COMMUNITY
Start: 2020-11-04 | End: 2021-03-30 | Stop reason: SDUPTHER

## 2020-12-28 RX ORDER — PRAVASTATIN SODIUM 40 MG/1
TABLET ORAL
COMMUNITY
Start: 2020-11-03 | End: 2021-03-30 | Stop reason: SDUPTHER

## 2020-12-28 RX ORDER — FUROSEMIDE 20 MG/1
TABLET ORAL
COMMUNITY
Start: 2020-11-03 | End: 2021-03-30 | Stop reason: SDUPTHER

## 2020-12-28 RX ORDER — QUETIAPINE FUMARATE 50 MG/1
TABLET, FILM COATED ORAL
COMMUNITY
Start: 2020-11-03 | End: 2021-03-30 | Stop reason: SDUPTHER

## 2020-12-28 NOTE — PROGRESS NOTES
"Outpatient Neurological Rehabilitation   Speech and Language Therapy Treatment Note    Date:  12/28/2020     Name: Agapito Bass   MRN: [id number]   Therapy Diagnosis:   Encounter Diagnoses   Name Primary?   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  Response to previous treatment: Increased use of multimodal communication.   Pain Scale:  0/10 on VAS currently.   Pain Location: n/a  Objective:         UNTIMED  Procedure Min.   Speech- Language- Voice Therapy  42   Total Timed Units: 0  Total Untimed Units: 1  Charges Billed/# of units: 1    Short Term Goals: (6 weeks) Current Progress:   Pt will name 15 items in a concrete category in 60 seconds.    Progressing/ Not Met 12/28/2020   Pt named 12 items in a concrete category in 60 seconds.    Met x1   Pt will participate in the speech production treatment hierarchy with monosyllabic /m/  Words.      Progressing/ Not Met 12/28/2020   Not addressed in today's session.     Met x1   Pt will participate in the speech production treatment hierarchy with monosyllabic /b/  Words.    Progressing/Not Met 12/28/2020  Not addressed in today's session.     Pt will participate in the Combined Aphasia and Apraxia of Speech Treatment (CAAST) with 5 verbs per session to improve verbal fluency and speech intelligibility.      Progressing/ Not " Met 12/28/2020   Not addressed in today's session.      Pt will name the item when given 3 descriptors with 90% accuracy independently to improve word finding.    Progressing/Not Met 12/28/2020  Not addressed in today's session.     Met x1   Pt will spell 2 syllable words with 70% accuracy independently.    Progressing/Not Met 12/28/2020  Not addressed in today's session.     Previously Met x1   Pt will participate in Verb Network Strength Training for 2-4 verbs per session with min A to improve word fluency. Goal Met 12/18/2020     Pt will verbalize 2-3 word finding strategies given minimal cues.    Progressing/Not Met 12/28/2020  Not addressed in today's session.    Pt will utilize word finding strategies in conversation independently across 3 sessions.     Progressing/Not Met 12/28/2020  Pt utilized word finding strategies (writing and first letter) given minimal cues.     Met x2   Pt will participate in Verb Network Strength Training for 2-4 verbs per session independently to improve word fluency.    Progressing/Not Met 12/28/2020  Npt participated in Verb Network Strength Training for 3 verbs given minimal cues overall mainly for higher level word finding and verbal reading accuracy.    Pt will name 1 synonym for a given word with 80% accuracy independently to address high level word finding deficits.    Progressing/Not Met 12/28/2020  Pt named 1 synonym for a given word with 70% accuracy independently.     Patient Education/Response:   Pt educated on increasing intelligibility strategies. Pt verbalized understanding.    Written Home Exercises Provided: Patient instructed to cont prior HEP.  Exercises were reviewed and Agapito was able to demonstrate them prior to the end of the session.  Agapito demonstrated good  understanding of the education provided.     See EMR under Patient Instructions for exercises provided 10/16/2020.  Assessment:   Agapito is progressing well towards his goals. Pt utilized writing, air  spelling, gestures, and drawing to communicate a complex story using multimodal communication strategies discussed over therapy. Frustration noted, however, pt able to continue discussing topic. Current goals remain appropriate. Goals to be updated as necessary.     Pt prognosis is Good. Pt will continue to benefit from skilled outpatient speech and language therapy to address the deficits listed in the problem list on initial evaluation, provide pt/family education and to maximize pt's level of independence in the home and community environment.   Medical necessity is demonstrated by the following IMPAIRMENTS:  decreased content words and significant word finding difficulty in all situations severely limiting functional communication with both familiar and unfamiliar communication partners to relay medically and safety relevant information in a timely manner in a state of emergency.   Barriers to Therapy: None Identified  Pt's spiritual, cultural and educational needs considered and pt agreeable to plan of care and goals.  Plan:   Continue POC with focus on rehabilitation and compensation for 4 domains of language.    Lisa Padilla CCC-SLP   12/28/2020

## 2020-12-29 ENCOUNTER — OFFICE VISIT (OUTPATIENT)
Dept: FAMILY MEDICINE | Facility: CLINIC | Age: 75
End: 2020-12-29
Payer: MEDICARE

## 2020-12-29 VITALS
WEIGHT: 176.81 LBS | SYSTOLIC BLOOD PRESSURE: 130 MMHG | HEIGHT: 65 IN | HEART RATE: 85 BPM | TEMPERATURE: 98 F | DIASTOLIC BLOOD PRESSURE: 80 MMHG | OXYGEN SATURATION: 96 % | BODY MASS INDEX: 29.46 KG/M2

## 2020-12-29 DIAGNOSIS — I48.0 PAROXYSMAL ATRIAL FIBRILLATION: ICD-10-CM

## 2020-12-29 DIAGNOSIS — M47.812 CERVICAL SPONDYLOSIS: ICD-10-CM

## 2020-12-29 DIAGNOSIS — I69.351 HEMIPARESIS AFFECTING RIGHT SIDE AS LATE EFFECT OF CEREBROVASCULAR ACCIDENT: ICD-10-CM

## 2020-12-29 DIAGNOSIS — I66.02 OCCLUSION AND STENOSIS OF LEFT MIDDLE CEREBRAL ARTERY: ICD-10-CM

## 2020-12-29 DIAGNOSIS — E11.21 DIABETIC GLOMERULOPATHY: ICD-10-CM

## 2020-12-29 DIAGNOSIS — F33.42 MAJOR DEPRESSIVE DISORDER, RECURRENT EPISODE, IN FULL REMISSION: ICD-10-CM

## 2020-12-29 DIAGNOSIS — I27.20 PULMONARY HYPERTENSION: ICD-10-CM

## 2020-12-29 DIAGNOSIS — K21.9 GASTROESOPHAGEAL REFLUX DISEASE, UNSPECIFIED WHETHER ESOPHAGITIS PRESENT: ICD-10-CM

## 2020-12-29 DIAGNOSIS — I77.810 DILATATION OF THORACIC AORTA: Primary | ICD-10-CM

## 2020-12-29 DIAGNOSIS — R48.2 APRAXIA OF SPEECH: ICD-10-CM

## 2020-12-29 DIAGNOSIS — R47.01 APHASIA: ICD-10-CM

## 2020-12-29 PROBLEM — I50.9 CONGESTIVE HEART FAILURE: Status: ACTIVE | Noted: 2019-10-17

## 2020-12-29 PROBLEM — I50.9 CONGESTIVE HEART FAILURE: Status: RESOLVED | Noted: 2019-10-17 | Resolved: 2020-12-29

## 2020-12-29 PROBLEM — I50.9 CHF (CONGESTIVE HEART FAILURE): Status: RESOLVED | Noted: 2019-12-19 | Resolved: 2020-12-29

## 2020-12-29 LAB
GLUCOSE SERPL-MCNC: 221 MG/DL (ref 70–110)
HBA1C MFR BLD: NORMAL %

## 2020-12-29 PROCEDURE — 3288F PR FALLS RISK ASSESSMENT DOCUMENTED: ICD-10-PCS | Mod: CPTII,S$GLB,, | Performed by: FAMILY MEDICINE

## 2020-12-29 PROCEDURE — 99214 PR OFFICE/OUTPT VISIT, EST, LEVL IV, 30-39 MIN: ICD-10-PCS | Mod: S$GLB,,, | Performed by: FAMILY MEDICINE

## 2020-12-29 PROCEDURE — 3288F FALL RISK ASSESSMENT DOCD: CPT | Mod: CPTII,S$GLB,, | Performed by: FAMILY MEDICINE

## 2020-12-29 PROCEDURE — 1101F PR PT FALLS ASSESS DOC 0-1 FALLS W/OUT INJ PAST YR: ICD-10-PCS | Mod: CPTII,S$GLB,, | Performed by: FAMILY MEDICINE

## 2020-12-29 PROCEDURE — 1126F PR PAIN SEVERITY QUANTIFIED, NO PAIN PRESENT: ICD-10-PCS | Mod: S$GLB,,, | Performed by: FAMILY MEDICINE

## 2020-12-29 PROCEDURE — 1126F AMNT PAIN NOTED NONE PRSNT: CPT | Mod: S$GLB,,, | Performed by: FAMILY MEDICINE

## 2020-12-29 PROCEDURE — 99214 OFFICE O/P EST MOD 30 MIN: CPT | Mod: S$GLB,,, | Performed by: FAMILY MEDICINE

## 2020-12-29 PROCEDURE — 1101F PT FALLS ASSESS-DOCD LE1/YR: CPT | Mod: CPTII,S$GLB,, | Performed by: FAMILY MEDICINE

## 2020-12-29 PROCEDURE — 1159F MED LIST DOCD IN RCRD: CPT | Mod: S$GLB,,, | Performed by: FAMILY MEDICINE

## 2020-12-29 PROCEDURE — 1159F PR MEDICATION LIST DOCUMENTED IN MEDICAL RECORD: ICD-10-PCS | Mod: S$GLB,,, | Performed by: FAMILY MEDICINE

## 2020-12-29 PROCEDURE — 82962 GLUCOSE BLOOD TEST: CPT | Mod: ,,, | Performed by: FAMILY MEDICINE

## 2020-12-29 PROCEDURE — 83036 POCT HEMOGLOBIN A1C: ICD-10-PCS | Mod: QW,,, | Performed by: FAMILY MEDICINE

## 2020-12-29 PROCEDURE — 83036 HEMOGLOBIN GLYCOSYLATED A1C: CPT | Mod: QW,,, | Performed by: FAMILY MEDICINE

## 2020-12-29 PROCEDURE — 82962 POCT GLUCOSE, HAND-HELD DEVICE: ICD-10-PCS | Mod: ,,, | Performed by: FAMILY MEDICINE

## 2020-12-29 RX ORDER — PANTOPRAZOLE SODIUM 40 MG/1
40 TABLET, DELAYED RELEASE ORAL DAILY
Qty: 90 TABLET | Refills: 3 | Status: SHIPPED | OUTPATIENT
Start: 2020-12-29 | End: 2021-03-30 | Stop reason: SDUPTHER

## 2020-12-29 RX ORDER — ACETAMINOPHEN 500 MG
TABLET ORAL
COMMUNITY

## 2020-12-29 RX ORDER — IBUPROFEN 200 MG
CAPSULE ORAL
COMMUNITY
End: 2021-04-05 | Stop reason: SDUPTHER

## 2020-12-29 RX ORDER — METFORMIN HYDROCHLORIDE 500 MG/1
TABLET, EXTENDED RELEASE ORAL
Qty: 270 TABLET | Refills: 3 | Status: SHIPPED | OUTPATIENT
Start: 2020-12-29 | End: 2022-11-08

## 2020-12-29 NOTE — PROGRESS NOTES
SUBJECTIVE:    Patient ID: Agapito Bass is a 75 y.o. male.    Chief Complaint: Diabetes    HPI    No visits with results within 6 Month(s) from this visit.   Latest known visit with results is:   No results found for any previous visit.       Past Medical History:   Diagnosis Date    CHF (congestive heart failure) 10/17/2019    Hyperlipemia, mixed     Hypertension     Stroke (cerebrum)      Social History     Socioeconomic History    Marital status:      Spouse name: Not on file    Number of children: Not on file    Years of education: Not on file    Highest education level: Not on file   Occupational History    Occupation: retired   Social Needs    Financial resource strain: Not on file    Food insecurity     Worry: Not on file     Inability: Not on file    Transportation needs     Medical: Not on file     Non-medical: Not on file   Tobacco Use    Smoking status: Never Smoker    Smokeless tobacco: Current User     Types: Chew   Substance and Sexual Activity    Alcohol use: Not Currently    Drug use: Not on file    Sexual activity: Not on file   Lifestyle    Physical activity     Days per week: Not on file     Minutes per session: Not on file    Stress: Not on file   Relationships    Social connections     Talks on phone: Not on file     Gets together: Not on file     Attends [employer] service: Not on file     Active member of club or organization: Not on file     Attends meetings of clubs or organizations: Not on file     Relationship status: Not on file   Other Topics Concern    Not on file   Social History Narrative    Not on file     Past Surgical History:   Procedure Laterality Date    ANGIOGRAPHY  10/01/2019    APPENDECTOMY  1952    CARDIAC CATHETERIZATION  07/20/2019    CARDIAC DEFIBRILLATOR PLACEMENT  03/13/2020    COLONOSCOPY  01/01/2015    ECHOCARDIOGRAPHY  07/20/2019    HERNIA REPAIR  2002     Family History   Problem Relation Age of Onset    Lung cancer Mother         Review of patient's allergies indicates:   Allergen Reactions    Invokana [canagliflozin]     Trulicity [dulaglutide]        Current Outpatient Medications:     apixaban (ELIQUIS) 5 mg Tab, Eliquis 5 mg tablet, Disp: , Rfl:     ascorbic acid, vitamin C, 500 mg/5 mL Liqd, ascorbic acid (vitamin C) 500 mg/5 mL oral liquid  once daily., Disp: , Rfl:     blood-glucose meter (ACCU-CHEK MATHEW PLUS METER MISC), Accu-Chek Mathew Plus Meter, Disp: , Rfl:     candesartan (ATACAND) 8 MG tablet, candesartan 8 mg tablet, Disp: , Rfl:     candesartan (ATACAND) 8 MG tablet, , Disp: , Rfl:     diltiaZEM (CARDIZEM) 60 MG tablet, , Disp: , Rfl:     diltiaZEM (CARDIZEM) 60 MG tablet, diltiazem 60 mg tablet  Take 1 tablet twice a day by oral route in the morning for 90 days., Disp: , Rfl:     docusate sodium (COLACE) 100 MG capsule, 100 mg., Disp: , Rfl:     ELIQUIS 5 mg Tab, , Disp: , Rfl:     fluorouraciL (EFUDEX) 5 % cream, , Disp: , Rfl:     fluorouraciL (EFUDEX) 5 % cream, fluorouracil 5 % topical cream, Disp: , Rfl:     furosemide (LASIX) 20 MG tablet, furosemide 20 mg tablet  Take 1 tablet twice a day by oral route., Disp: , Rfl:     furosemide (LASIX) 20 MG tablet, , Disp: , Rfl:     glimepiride (AMARYL) 2 MG tablet, , Disp: , Rfl:     glimepiride (AMARYL) 2 MG tablet, glimepiride 2 mg tablet, Disp: , Rfl:     hydrOXYzine HCL (ATARAX) 25 MG tablet, , Disp: , Rfl:     hydrOXYzine HCL (ATARAX) 25 MG tablet, hydroxyzine HCl 25 mg tablet  Take 1 tablet 3 times a day by oral route., Disp: , Rfl:     metFORMIN (GLUCOPHAGE) 500 MG tablet, , Disp: , Rfl:     metFORMIN (GLUCOPHAGE) 500 MG tablet, metformin 500 mg tablet  Take 1 tablet twice a day by oral route., Disp: , Rfl:     metoprolol tartrate (LOPRESSOR) 100 MG tablet, , Disp: , Rfl:     metoprolol tartrate (LOPRESSOR) 100 MG tablet, metoprolol tartrate 100 mg tablet, Disp: , Rfl:     pravastatin (PRAVACHOL) 40 MG tablet, , Disp: , Rfl:      "pravastatin (PRAVACHOL) 40 MG tablet, pravastatin 40 mg tablet, Disp: , Rfl:     QUEtiapine (SEROQUEL) 50 MG tablet, , Disp: , Rfl:     QUEtiapine (SEROQUEL) 50 MG tablet, quetiapine 50 mg tablet  Take 1 tablet every day by oral route at bedtime for 90 days., Disp: , Rfl:     terazosin (HYTRIN) 2 MG capsule, , Disp: , Rfl:     terazosin (HYTRIN) 2 MG capsule, terazosin 2 mg capsule  Take 1 capsule every day by oral route at bedtime for 90 days., Disp: , Rfl:     Review of Systems        Objective:      Vitals:    12/29/20 1008   BP: 130/80   Pulse: 85   Temp: 98.3 °F (36.8 °C)   SpO2: 96%   Weight: 80.2 kg (176 lb 12.8 oz)   Height: 5' 5" (1.651 m)     Physical Exam  HENT:      Right Ear: Decreased hearing noted.      Left Ear: Decreased hearing noted.   Neck:      Vascular: No carotid bruit.   Cardiovascular:      Rate and Rhythm: Regular rhythm.      Heart sounds: Heart sounds are distant. No gallop.    Musculoskeletal:      Right lower leg: No edema.      Left lower leg: No edema.   Neurological:      Mental Status: He is alert.           Assessment:       1. Dilatation of thoracic aorta    2. 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 Diabetic glomerulopathy    4. Aphasia    5. Hemiparesis affecting right side as late effect of cerebrovascular accident    6. Paroxysmal atrial fibrillation    7. Major depressive disorder, recurrent episode, in full remission         Plan:       Dilatation of thoracic aorta    Occlusion and stenosis of left middle cerebral artery    Diabetic glomerulopathy    Aphasia    Hemiparesis affecting right side as late effect of cerebrovascular accident    Paroxysmal atrial fibrillation    Major depressive disorder, recurrent episode, in full remission      No follow-ups on file.        12/29/2020 César Mejia M.D.      "

## 2021-01-04 ENCOUNTER — TELEPHONE (OUTPATIENT)
Dept: FAMILY MEDICINE | Facility: CLINIC | Age: 76
End: 2021-01-04

## 2021-01-04 ENCOUNTER — NURSE TRIAGE (OUTPATIENT)
Dept: ADMINISTRATIVE | Facility: CLINIC | Age: 76
End: 2021-01-04

## 2021-01-04 ENCOUNTER — OFFICE VISIT (OUTPATIENT)
Dept: FAMILY MEDICINE | Facility: CLINIC | Age: 76
End: 2021-01-04
Payer: MEDICARE

## 2021-01-04 VITALS
BODY MASS INDEX: 29.45 KG/M2 | OXYGEN SATURATION: 94 % | SYSTOLIC BLOOD PRESSURE: 126 MMHG | WEIGHT: 177 LBS | HEART RATE: 95 BPM | DIASTOLIC BLOOD PRESSURE: 84 MMHG

## 2021-01-04 DIAGNOSIS — E11.21 DIABETIC GLOMERULOPATHY: Primary | ICD-10-CM

## 2021-01-04 DIAGNOSIS — I48.0 PAROXYSMAL ATRIAL FIBRILLATION: ICD-10-CM

## 2021-01-04 PROCEDURE — 1159F MED LIST DOCD IN RCRD: CPT | Mod: S$GLB,,, | Performed by: FAMILY MEDICINE

## 2021-01-04 PROCEDURE — 99213 PR OFFICE/OUTPT VISIT, EST, LEVL III, 20-29 MIN: ICD-10-PCS | Mod: S$GLB,,, | Performed by: FAMILY MEDICINE

## 2021-01-04 PROCEDURE — 1101F PR PT FALLS ASSESS DOC 0-1 FALLS W/OUT INJ PAST YR: ICD-10-PCS | Mod: CPTII,S$GLB,, | Performed by: FAMILY MEDICINE

## 2021-01-04 PROCEDURE — 3288F FALL RISK ASSESSMENT DOCD: CPT | Mod: CPTII,S$GLB,, | Performed by: FAMILY MEDICINE

## 2021-01-04 PROCEDURE — 1101F PT FALLS ASSESS-DOCD LE1/YR: CPT | Mod: CPTII,S$GLB,, | Performed by: FAMILY MEDICINE

## 2021-01-04 PROCEDURE — 1159F PR MEDICATION LIST DOCUMENTED IN MEDICAL RECORD: ICD-10-PCS | Mod: S$GLB,,, | Performed by: FAMILY MEDICINE

## 2021-01-04 PROCEDURE — 3288F PR FALLS RISK ASSESSMENT DOCUMENTED: ICD-10-PCS | Mod: CPTII,S$GLB,, | Performed by: FAMILY MEDICINE

## 2021-01-04 PROCEDURE — 99213 OFFICE O/P EST LOW 20 MIN: CPT | Mod: S$GLB,,, | Performed by: FAMILY MEDICINE

## 2021-01-04 RX ORDER — GLIMEPIRIDE 2 MG/1
TABLET ORAL
Qty: 270 TABLET | Refills: 3 | Status: SHIPPED | OUTPATIENT
Start: 2021-01-04 | End: 2021-03-30 | Stop reason: SDUPTHER

## 2021-01-06 ENCOUNTER — CLINICAL SUPPORT (OUTPATIENT)
Dept: REHABILITATION | Facility: HOSPITAL | Age: 76
End: 2021-01-06
Payer: MEDICARE

## 2021-01-06 DIAGNOSIS — R48.2 APRAXIA OF SPEECH: ICD-10-CM

## 2021-01-06 DIAGNOSIS — R47.01 APHASIA: Primary | ICD-10-CM

## 2021-01-06 PROCEDURE — 92507 TX SP LANG VOICE COMM INDIV: CPT | Mod: PN

## 2021-01-11 ENCOUNTER — CLINICAL SUPPORT (OUTPATIENT)
Dept: REHABILITATION | Facility: HOSPITAL | Age: 76
End: 2021-01-11
Payer: MEDICARE

## 2021-01-11 DIAGNOSIS — R48.2 APRAXIA OF SPEECH: ICD-10-CM

## 2021-01-11 DIAGNOSIS — R47.01 APHASIA: ICD-10-CM

## 2021-01-11 PROCEDURE — 92507 TX SP LANG VOICE COMM INDIV: CPT | Mod: PN

## 2021-01-13 ENCOUNTER — CLINICAL SUPPORT (OUTPATIENT)
Dept: REHABILITATION | Facility: HOSPITAL | Age: 76
End: 2021-01-13
Payer: MEDICARE

## 2021-01-13 DIAGNOSIS — R47.01 APHASIA: ICD-10-CM

## 2021-01-13 DIAGNOSIS — R48.2 APRAXIA OF SPEECH: ICD-10-CM

## 2021-01-13 PROCEDURE — 92507 TX SP LANG VOICE COMM INDIV: CPT | Mod: PN

## 2021-01-15 ENCOUNTER — LAB VISIT (OUTPATIENT)
Dept: LAB | Facility: CLINIC | Age: 76
End: 2021-01-15
Payer: MEDICARE

## 2021-01-15 DIAGNOSIS — E11.21 DIABETIC GLOMERULOPATHY: ICD-10-CM

## 2021-01-15 DIAGNOSIS — I66.02 OCCLUSION AND STENOSIS OF LEFT MIDDLE CEREBRAL ARTERY: ICD-10-CM

## 2021-01-15 LAB
ALBUMIN SERPL BCP-MCNC: 3.8 G/DL (ref 3.5–5.2)
ALP SERPL-CCNC: 108 U/L (ref 55–135)
ALT SERPL W/O P-5'-P-CCNC: 26 U/L (ref 10–44)
ANION GAP SERPL CALC-SCNC: 9 MMOL/L (ref 8–16)
AST SERPL-CCNC: 26 U/L (ref 10–40)
BILIRUB SERPL-MCNC: 0.5 MG/DL (ref 0.1–1)
BUN SERPL-MCNC: 22 MG/DL (ref 8–23)
CALCIUM SERPL-MCNC: 9.5 MG/DL (ref 8.7–10.5)
CHLORIDE SERPL-SCNC: 104 MMOL/L (ref 95–110)
CHOLEST SERPL-MCNC: 117 MG/DL (ref 120–199)
CHOLEST/HDLC SERPL: 4.2 {RATIO} (ref 2–5)
CO2 SERPL-SCNC: 29 MMOL/L (ref 23–29)
CREAT SERPL-MCNC: 1.5 MG/DL (ref 0.5–1.4)
EST. GFR  (AFRICAN AMERICAN): 52 ML/MIN/1.73 M^2
EST. GFR  (NON AFRICAN AMERICAN): 45 ML/MIN/1.73 M^2
GLUCOSE SERPL-MCNC: 136 MG/DL (ref 70–110)
HDLC SERPL-MCNC: 28 MG/DL (ref 40–75)
HDLC SERPL: 23.9 % (ref 20–50)
LDLC SERPL CALC-MCNC: 64.6 MG/DL (ref 63–159)
NONHDLC SERPL-MCNC: 89 MG/DL
POTASSIUM SERPL-SCNC: 4 MMOL/L (ref 3.5–5.1)
PROT SERPL-MCNC: 7.5 G/DL (ref 6–8.4)
SODIUM SERPL-SCNC: 142 MMOL/L (ref 136–145)
TRIGL SERPL-MCNC: 122 MG/DL (ref 30–150)

## 2021-01-15 PROCEDURE — 80053 COMPREHEN METABOLIC PANEL: CPT

## 2021-01-15 PROCEDURE — 80061 LIPID PANEL: CPT

## 2021-01-21 ENCOUNTER — CLINICAL SUPPORT (OUTPATIENT)
Dept: REHABILITATION | Facility: HOSPITAL | Age: 76
End: 2021-01-21
Payer: MEDICARE

## 2021-01-21 DIAGNOSIS — R48.2 APRAXIA OF SPEECH: ICD-10-CM

## 2021-01-21 DIAGNOSIS — R47.01 APHASIA: ICD-10-CM

## 2021-01-21 PROCEDURE — 92507 TX SP LANG VOICE COMM INDIV: CPT | Mod: PN

## 2021-01-25 ENCOUNTER — CLINICAL SUPPORT (OUTPATIENT)
Dept: REHABILITATION | Facility: HOSPITAL | Age: 76
End: 2021-01-25
Payer: MEDICARE

## 2021-01-25 DIAGNOSIS — R47.01 APHASIA: ICD-10-CM

## 2021-01-25 DIAGNOSIS — R48.2 APRAXIA OF SPEECH: ICD-10-CM

## 2021-01-25 PROCEDURE — 92507 TX SP LANG VOICE COMM INDIV: CPT | Mod: PN

## 2021-02-01 ENCOUNTER — CLINICAL SUPPORT (OUTPATIENT)
Dept: REHABILITATION | Facility: HOSPITAL | Age: 76
End: 2021-02-01
Payer: MEDICARE

## 2021-02-01 DIAGNOSIS — R48.2 APRAXIA OF SPEECH: ICD-10-CM

## 2021-02-01 DIAGNOSIS — R47.01 APHASIA: ICD-10-CM

## 2021-02-01 PROCEDURE — 92507 TX SP LANG VOICE COMM INDIV: CPT | Mod: PN

## 2021-02-03 ENCOUNTER — CLINICAL SUPPORT (OUTPATIENT)
Dept: REHABILITATION | Facility: HOSPITAL | Age: 76
End: 2021-02-03
Payer: MEDICARE

## 2021-02-03 DIAGNOSIS — R47.01 APHASIA: ICD-10-CM

## 2021-02-03 DIAGNOSIS — R48.2 APRAXIA OF SPEECH: ICD-10-CM

## 2021-02-03 PROCEDURE — 92507 TX SP LANG VOICE COMM INDIV: CPT | Mod: PN

## 2021-02-10 ENCOUNTER — CLINICAL SUPPORT (OUTPATIENT)
Dept: REHABILITATION | Facility: HOSPITAL | Age: 76
End: 2021-02-10
Payer: MEDICARE

## 2021-02-10 DIAGNOSIS — R48.2 APRAXIA OF SPEECH: ICD-10-CM

## 2021-02-10 DIAGNOSIS — R47.01 APHASIA: Primary | ICD-10-CM

## 2021-02-10 PROCEDURE — 92507 TX SP LANG VOICE COMM INDIV: CPT | Mod: PN

## 2021-02-22 ENCOUNTER — CLINICAL SUPPORT (OUTPATIENT)
Dept: REHABILITATION | Facility: HOSPITAL | Age: 76
End: 2021-02-22
Payer: MEDICARE

## 2021-02-22 DIAGNOSIS — R48.2 APRAXIA OF SPEECH: ICD-10-CM

## 2021-02-22 DIAGNOSIS — R47.01 APHASIA: Primary | ICD-10-CM

## 2021-02-22 PROCEDURE — 92507 TX SP LANG VOICE COMM INDIV: CPT | Mod: PN

## 2021-02-24 ENCOUNTER — CLINICAL SUPPORT (OUTPATIENT)
Dept: REHABILITATION | Facility: HOSPITAL | Age: 76
End: 2021-02-24
Payer: MEDICARE

## 2021-02-24 DIAGNOSIS — R47.01 APHASIA: Primary | ICD-10-CM

## 2021-02-24 DIAGNOSIS — R48.2 APRAXIA OF SPEECH: ICD-10-CM

## 2021-02-24 PROCEDURE — 92507 TX SP LANG VOICE COMM INDIV: CPT | Mod: PN

## 2021-02-25 PROBLEM — R47.01 APHASIA: Status: RESOLVED | Noted: 2020-09-24 | Resolved: 2021-02-25

## 2021-03-01 ENCOUNTER — CLINICAL SUPPORT (OUTPATIENT)
Dept: REHABILITATION | Facility: HOSPITAL | Age: 76
End: 2021-03-01
Payer: MEDICARE

## 2021-03-01 DIAGNOSIS — R47.01 APHASIA: Primary | ICD-10-CM

## 2021-03-01 DIAGNOSIS — R48.2 APRAXIA OF SPEECH: ICD-10-CM

## 2021-03-01 PROCEDURE — 92507 TX SP LANG VOICE COMM INDIV: CPT | Mod: PN

## 2021-03-03 ENCOUNTER — TELEPHONE (OUTPATIENT)
Dept: FAMILY MEDICINE | Facility: CLINIC | Age: 76
End: 2021-03-03

## 2021-03-03 ENCOUNTER — CLINICAL SUPPORT (OUTPATIENT)
Dept: REHABILITATION | Facility: HOSPITAL | Age: 76
End: 2021-03-03
Payer: MEDICARE

## 2021-03-03 DIAGNOSIS — R48.2 APRAXIA OF SPEECH: ICD-10-CM

## 2021-03-03 DIAGNOSIS — R47.01 APHASIA: Primary | ICD-10-CM

## 2021-03-03 PROCEDURE — 92507 TX SP LANG VOICE COMM INDIV: CPT | Mod: PN

## 2021-03-10 ENCOUNTER — CLINICAL SUPPORT (OUTPATIENT)
Dept: REHABILITATION | Facility: HOSPITAL | Age: 76
End: 2021-03-10
Payer: MEDICARE

## 2021-03-10 DIAGNOSIS — R47.01 APHASIA: Primary | ICD-10-CM

## 2021-03-10 DIAGNOSIS — R48.2 APRAXIA OF SPEECH: ICD-10-CM

## 2021-03-10 PROCEDURE — 92507 TX SP LANG VOICE COMM INDIV: CPT | Mod: PN

## 2021-03-11 ENCOUNTER — CLINICAL SUPPORT (OUTPATIENT)
Dept: REHABILITATION | Facility: HOSPITAL | Age: 76
End: 2021-03-11
Payer: MEDICARE

## 2021-03-11 DIAGNOSIS — R48.2 APRAXIA OF SPEECH: ICD-10-CM

## 2021-03-11 DIAGNOSIS — R47.01 APHASIA: Primary | ICD-10-CM

## 2021-03-11 PROCEDURE — 92507 TX SP LANG VOICE COMM INDIV: CPT | Mod: PN

## 2021-03-15 ENCOUNTER — CLINICAL SUPPORT (OUTPATIENT)
Dept: REHABILITATION | Facility: HOSPITAL | Age: 76
End: 2021-03-15
Payer: MEDICARE

## 2021-03-15 DIAGNOSIS — R48.2 APRAXIA OF SPEECH: ICD-10-CM

## 2021-03-15 DIAGNOSIS — R47.01 APHASIA: Primary | ICD-10-CM

## 2021-03-15 PROCEDURE — 92507 TX SP LANG VOICE COMM INDIV: CPT | Mod: PN

## 2021-03-22 ENCOUNTER — CLINICAL SUPPORT (OUTPATIENT)
Dept: REHABILITATION | Facility: HOSPITAL | Age: 76
End: 2021-03-22
Payer: MEDICARE

## 2021-03-22 DIAGNOSIS — R47.01 APHASIA: Primary | ICD-10-CM

## 2021-03-22 DIAGNOSIS — R48.2 APRAXIA OF SPEECH: ICD-10-CM

## 2021-03-22 PROCEDURE — 92507 TX SP LANG VOICE COMM INDIV: CPT | Mod: PN

## 2021-03-29 ENCOUNTER — CLINICAL SUPPORT (OUTPATIENT)
Dept: REHABILITATION | Facility: HOSPITAL | Age: 76
End: 2021-03-29
Payer: MEDICARE

## 2021-03-29 DIAGNOSIS — R48.2 APRAXIA OF SPEECH: ICD-10-CM

## 2021-03-29 DIAGNOSIS — R47.01 APHASIA: Primary | ICD-10-CM

## 2021-03-29 PROCEDURE — 92507 TX SP LANG VOICE COMM INDIV: CPT | Mod: PN

## 2021-03-30 ENCOUNTER — OFFICE VISIT (OUTPATIENT)
Dept: FAMILY MEDICINE | Facility: CLINIC | Age: 76
End: 2021-03-30
Payer: MEDICARE

## 2021-03-30 ENCOUNTER — PATIENT OUTREACH (OUTPATIENT)
Dept: ADMINISTRATIVE | Facility: HOSPITAL | Age: 76
End: 2021-03-30

## 2021-03-30 ENCOUNTER — LAB VISIT (OUTPATIENT)
Dept: LAB | Facility: CLINIC | Age: 76
End: 2021-03-30
Payer: MEDICARE

## 2021-03-30 ENCOUNTER — APPOINTMENT (OUTPATIENT)
Dept: LAB | Facility: HOSPITAL | Age: 76
End: 2021-03-30
Attending: FAMILY MEDICINE
Payer: MEDICARE

## 2021-03-30 VITALS
HEART RATE: 81 BPM | BODY MASS INDEX: 29.53 KG/M2 | TEMPERATURE: 98 F | HEIGHT: 64 IN | WEIGHT: 173 LBS | SYSTOLIC BLOOD PRESSURE: 116 MMHG | OXYGEN SATURATION: 95 % | DIASTOLIC BLOOD PRESSURE: 64 MMHG

## 2021-03-30 DIAGNOSIS — E11.21 DIABETIC GLOMERULOPATHY: ICD-10-CM

## 2021-03-30 DIAGNOSIS — N40.0 BENIGN PROSTATIC HYPERPLASIA WITHOUT LOWER URINARY TRACT SYMPTOMS: ICD-10-CM

## 2021-03-30 DIAGNOSIS — F41.9 ANXIETY DISORDER, UNSPECIFIED TYPE: ICD-10-CM

## 2021-03-30 DIAGNOSIS — I66.02 OCCLUSION AND STENOSIS OF LEFT MIDDLE CEREBRAL ARTERY: ICD-10-CM

## 2021-03-30 DIAGNOSIS — I27.20 PULMONARY HYPERTENSION: ICD-10-CM

## 2021-03-30 DIAGNOSIS — E11.21 DIABETIC GLOMERULOPATHY: Primary | ICD-10-CM

## 2021-03-30 DIAGNOSIS — Z11.59 ENCOUNTER FOR HEPATITIS C SCREENING TEST FOR LOW RISK PATIENT: ICD-10-CM

## 2021-03-30 DIAGNOSIS — I48.0 PAROXYSMAL ATRIAL FIBRILLATION: ICD-10-CM

## 2021-03-30 DIAGNOSIS — N18.31 STAGE 3A CHRONIC KIDNEY DISEASE: ICD-10-CM

## 2021-03-30 DIAGNOSIS — I69.351 HEMIPARESIS AFFECTING RIGHT SIDE AS LATE EFFECT OF CEREBROVASCULAR ACCIDENT: ICD-10-CM

## 2021-03-30 DIAGNOSIS — I50.9 CONGESTIVE HEART FAILURE, UNSPECIFIED HF CHRONICITY, UNSPECIFIED HEART FAILURE TYPE: ICD-10-CM

## 2021-03-30 DIAGNOSIS — E78.5 HYPERLIPIDEMIA, UNSPECIFIED HYPERLIPIDEMIA TYPE: ICD-10-CM

## 2021-03-30 DIAGNOSIS — E11.9 DIABETES MELLITUS WITHOUT COMPLICATION: ICD-10-CM

## 2021-03-30 DIAGNOSIS — Z23 IMMUNIZATION DUE: ICD-10-CM

## 2021-03-30 DIAGNOSIS — K21.9 GASTROESOPHAGEAL REFLUX DISEASE, UNSPECIFIED WHETHER ESOPHAGITIS PRESENT: ICD-10-CM

## 2021-03-30 LAB
ANION GAP SERPL CALC-SCNC: 9 MMOL/L (ref 8–16)
BUN SERPL-MCNC: 28 MG/DL (ref 8–23)
CALCIUM SERPL-MCNC: 9.8 MG/DL (ref 8.7–10.5)
CHLORIDE SERPL-SCNC: 103 MMOL/L (ref 95–110)
CO2 SERPL-SCNC: 26 MMOL/L (ref 23–29)
CREAT SERPL-MCNC: 1.5 MG/DL (ref 0.5–1.4)
EST. GFR  (AFRICAN AMERICAN): 52 ML/MIN/1.73 M^2
EST. GFR  (NON AFRICAN AMERICAN): 45 ML/MIN/1.73 M^2
GLUCOSE SERPL-MCNC: 159 MG/DL (ref 70–110)
GLUCOSE SERPL-MCNC: 159 MG/DL (ref 70–110)
POTASSIUM SERPL-SCNC: 4.3 MMOL/L (ref 3.5–5.1)
SODIUM SERPL-SCNC: 138 MMOL/L (ref 136–145)

## 2021-03-30 PROCEDURE — 36415 COLL VENOUS BLD VENIPUNCTURE: CPT | Mod: ,,, | Performed by: FAMILY MEDICINE

## 2021-03-30 PROCEDURE — 83036 HEMOGLOBIN GLYCOSYLATED A1C: CPT | Performed by: FAMILY MEDICINE

## 2021-03-30 PROCEDURE — 1159F PR MEDICATION LIST DOCUMENTED IN MEDICAL RECORD: ICD-10-PCS | Mod: S$GLB,,, | Performed by: FAMILY MEDICINE

## 2021-03-30 PROCEDURE — 1159F MED LIST DOCD IN RCRD: CPT | Mod: S$GLB,,, | Performed by: FAMILY MEDICINE

## 2021-03-30 PROCEDURE — 80048 BASIC METABOLIC PNL TOTAL CA: CPT | Performed by: FAMILY MEDICINE

## 2021-03-30 PROCEDURE — 86803 HEPATITIS C AB TEST: CPT | Performed by: FAMILY MEDICINE

## 2021-03-30 PROCEDURE — 90670 PR PNEUMOCOCCAL CONJ VACCINE 13 VALENT IM: ICD-10-PCS | Mod: S$GLB,,, | Performed by: FAMILY MEDICINE

## 2021-03-30 PROCEDURE — 99214 OFFICE O/P EST MOD 30 MIN: CPT | Mod: 25,S$GLB,, | Performed by: FAMILY MEDICINE

## 2021-03-30 PROCEDURE — 99214 PR OFFICE/OUTPT VISIT, EST, LEVL IV, 30-39 MIN: ICD-10-PCS | Mod: 25,S$GLB,, | Performed by: FAMILY MEDICINE

## 2021-03-30 PROCEDURE — 90670 PCV13 VACCINE IM: CPT | Mod: S$GLB,,, | Performed by: FAMILY MEDICINE

## 2021-03-30 PROCEDURE — G0009 PR ADMIN PNEUMOCOCCAL VACCINE: ICD-10-PCS | Mod: S$GLB,,, | Performed by: FAMILY MEDICINE

## 2021-03-30 PROCEDURE — 36415 PR COLLECTION VENOUS BLOOD,VENIPUNCTURE: ICD-10-PCS | Mod: ,,, | Performed by: FAMILY MEDICINE

## 2021-03-30 PROCEDURE — G0009 ADMIN PNEUMOCOCCAL VACCINE: HCPCS | Mod: S$GLB,,, | Performed by: FAMILY MEDICINE

## 2021-03-30 RX ORDER — PRAVASTATIN SODIUM 40 MG/1
40 TABLET ORAL DAILY
Qty: 90 TABLET | Refills: 3 | Status: SHIPPED | OUTPATIENT
Start: 2021-03-30 | End: 2021-12-21 | Stop reason: SDUPTHER

## 2021-03-30 RX ORDER — PANTOPRAZOLE SODIUM 40 MG/1
40 TABLET, DELAYED RELEASE ORAL DAILY
Qty: 90 TABLET | Refills: 3 | Status: SHIPPED | OUTPATIENT
Start: 2021-03-30 | End: 2022-01-04 | Stop reason: SDUPTHER

## 2021-03-30 RX ORDER — TERAZOSIN 2 MG/1
2 CAPSULE ORAL NIGHTLY
Qty: 90 CAPSULE | Refills: 3 | Status: SHIPPED | OUTPATIENT
Start: 2021-03-30 | End: 2022-01-04 | Stop reason: SDUPTHER

## 2021-03-30 RX ORDER — QUETIAPINE FUMARATE 50 MG/1
50 TABLET, FILM COATED ORAL NIGHTLY
Qty: 90 TABLET | Refills: 3 | Status: SHIPPED | OUTPATIENT
Start: 2021-03-30 | End: 2022-01-04 | Stop reason: SDUPTHER

## 2021-03-30 RX ORDER — GLIMEPIRIDE 2 MG/1
TABLET ORAL
Qty: 270 TABLET | Refills: 3 | Status: SHIPPED | OUTPATIENT
Start: 2021-03-30 | End: 2022-07-07 | Stop reason: ALTCHOICE

## 2021-03-30 RX ORDER — CANDESARTAN 8 MG/1
8 TABLET ORAL DAILY
Qty: 90 TABLET | Refills: 3 | Status: SHIPPED | OUTPATIENT
Start: 2021-03-30 | End: 2022-01-04 | Stop reason: SDUPTHER

## 2021-03-30 RX ORDER — FUROSEMIDE 20 MG/1
20 TABLET ORAL 2 TIMES DAILY
Qty: 180 TABLET | Refills: 3 | Status: SHIPPED | OUTPATIENT
Start: 2021-03-30 | End: 2021-12-21 | Stop reason: SDUPTHER

## 2021-03-30 RX ORDER — METOPROLOL TARTRATE 100 MG/1
100 TABLET ORAL 2 TIMES DAILY
Qty: 180 TABLET | Refills: 3 | Status: SHIPPED | OUTPATIENT
Start: 2021-03-30 | End: 2022-01-04 | Stop reason: SDUPTHER

## 2021-03-30 RX ORDER — DILTIAZEM HYDROCHLORIDE 60 MG/1
60 TABLET, FILM COATED ORAL 2 TIMES DAILY
Qty: 180 TABLET | Refills: 3 | Status: SHIPPED | OUTPATIENT
Start: 2021-03-30 | End: 2022-01-04 | Stop reason: SDUPTHER

## 2021-03-30 RX ORDER — HYDROXYZINE HYDROCHLORIDE 25 MG/1
25 TABLET, FILM COATED ORAL 2 TIMES DAILY
Qty: 180 TABLET | Refills: 3 | Status: SHIPPED | OUTPATIENT
Start: 2021-03-30 | End: 2022-01-04 | Stop reason: SDUPTHER

## 2021-03-31 ENCOUNTER — PATIENT OUTREACH (OUTPATIENT)
Dept: ADMINISTRATIVE | Facility: HOSPITAL | Age: 76
End: 2021-03-31

## 2021-03-31 LAB
ESTIMATED AVG GLUCOSE: 146 MG/DL (ref 68–131)
HBA1C MFR BLD: 6.7 % (ref 4–5.6)
HCV AB SERPL QL IA: NEGATIVE

## 2021-04-05 RX ORDER — IBUPROFEN 200 MG
3 CAPSULE ORAL DAILY
Qty: 270 STRIP | Refills: 3 | Status: SHIPPED | OUTPATIENT
Start: 2021-04-05 | End: 2022-01-04 | Stop reason: SDUPTHER

## 2021-05-24 ENCOUNTER — DOCUMENTATION ONLY (OUTPATIENT)
Dept: REHABILITATION | Facility: HOSPITAL | Age: 76
End: 2021-05-24

## 2021-05-24 DIAGNOSIS — R47.01 APHASIA: Primary | ICD-10-CM

## 2021-05-24 DIAGNOSIS — R48.2 APRAXIA OF SPEECH: ICD-10-CM

## 2021-07-01 ENCOUNTER — OFFICE VISIT (OUTPATIENT)
Dept: FAMILY MEDICINE | Facility: CLINIC | Age: 76
End: 2021-07-01
Payer: MEDICARE

## 2021-07-01 VITALS
WEIGHT: 174 LBS | OXYGEN SATURATION: 95 % | HEART RATE: 88 BPM | HEIGHT: 64 IN | TEMPERATURE: 98 F | BODY MASS INDEX: 29.71 KG/M2 | SYSTOLIC BLOOD PRESSURE: 100 MMHG | DIASTOLIC BLOOD PRESSURE: 66 MMHG

## 2021-07-01 DIAGNOSIS — I69.351 HEMIPARESIS AFFECTING RIGHT SIDE AS LATE EFFECT OF CEREBROVASCULAR ACCIDENT: ICD-10-CM

## 2021-07-01 DIAGNOSIS — N18.31 STAGE 3A CHRONIC KIDNEY DISEASE: Primary | ICD-10-CM

## 2021-07-01 DIAGNOSIS — E11.21 DIABETIC GLOMERULOPATHY: ICD-10-CM

## 2021-07-01 DIAGNOSIS — R48.2 APRAXIA OF SPEECH: ICD-10-CM

## 2021-07-01 DIAGNOSIS — I48.0 PAROXYSMAL ATRIAL FIBRILLATION: ICD-10-CM

## 2021-07-01 PROCEDURE — 99213 PR OFFICE/OUTPT VISIT, EST, LEVL III, 20-29 MIN: ICD-10-PCS | Mod: S$GLB,,, | Performed by: FAMILY MEDICINE

## 2021-07-01 PROCEDURE — 1159F PR MEDICATION LIST DOCUMENTED IN MEDICAL RECORD: ICD-10-PCS | Mod: S$GLB,,, | Performed by: FAMILY MEDICINE

## 2021-07-01 PROCEDURE — 1101F PR PT FALLS ASSESS DOC 0-1 FALLS W/OUT INJ PAST YR: ICD-10-PCS | Mod: CPTII,S$GLB,, | Performed by: FAMILY MEDICINE

## 2021-07-01 PROCEDURE — 1101F PT FALLS ASSESS-DOCD LE1/YR: CPT | Mod: CPTII,S$GLB,, | Performed by: FAMILY MEDICINE

## 2021-07-01 PROCEDURE — 3288F FALL RISK ASSESSMENT DOCD: CPT | Mod: CPTII,S$GLB,, | Performed by: FAMILY MEDICINE

## 2021-07-01 PROCEDURE — 1159F MED LIST DOCD IN RCRD: CPT | Mod: S$GLB,,, | Performed by: FAMILY MEDICINE

## 2021-07-01 PROCEDURE — 99213 OFFICE O/P EST LOW 20 MIN: CPT | Mod: S$GLB,,, | Performed by: FAMILY MEDICINE

## 2021-07-01 PROCEDURE — 3288F PR FALLS RISK ASSESSMENT DOCUMENTED: ICD-10-PCS | Mod: CPTII,S$GLB,, | Performed by: FAMILY MEDICINE

## 2021-08-16 LAB
LEFT EYE DM RETINOPATHY: NEGATIVE
RIGHT EYE DM RETINOPATHY: NEGATIVE

## 2021-08-18 ENCOUNTER — PATIENT OUTREACH (OUTPATIENT)
Dept: ADMINISTRATIVE | Facility: HOSPITAL | Age: 76
End: 2021-08-18

## 2021-10-05 ENCOUNTER — LAB VISIT (OUTPATIENT)
Dept: LAB | Facility: CLINIC | Age: 76
End: 2021-10-05
Payer: MEDICARE

## 2021-10-05 ENCOUNTER — OFFICE VISIT (OUTPATIENT)
Dept: FAMILY MEDICINE | Facility: CLINIC | Age: 76
End: 2021-10-05
Payer: MEDICARE

## 2021-10-05 ENCOUNTER — TELEPHONE (OUTPATIENT)
Dept: FAMILY MEDICINE | Facility: CLINIC | Age: 76
End: 2021-10-05

## 2021-10-05 VITALS
OXYGEN SATURATION: 97 % | WEIGHT: 177 LBS | BODY MASS INDEX: 30.22 KG/M2 | HEART RATE: 71 BPM | HEIGHT: 64 IN | DIASTOLIC BLOOD PRESSURE: 80 MMHG | TEMPERATURE: 99 F | SYSTOLIC BLOOD PRESSURE: 124 MMHG

## 2021-10-05 DIAGNOSIS — Z12.5 ENCOUNTER FOR SCREENING FOR MALIGNANT NEOPLASM OF PROSTATE: ICD-10-CM

## 2021-10-05 DIAGNOSIS — I48.0 PAROXYSMAL ATRIAL FIBRILLATION: ICD-10-CM

## 2021-10-05 DIAGNOSIS — I69.351 HEMIPARESIS AFFECTING RIGHT SIDE AS LATE EFFECT OF CEREBROVASCULAR ACCIDENT: ICD-10-CM

## 2021-10-05 DIAGNOSIS — R39.11 BENIGN PROSTATIC HYPERPLASIA WITH URINARY HESITANCY: ICD-10-CM

## 2021-10-05 DIAGNOSIS — F33.42 MAJOR DEPRESSIVE DISORDER, RECURRENT EPISODE, IN FULL REMISSION: ICD-10-CM

## 2021-10-05 DIAGNOSIS — E11.21 DIABETIC GLOMERULOPATHY: ICD-10-CM

## 2021-10-05 DIAGNOSIS — N40.1 BENIGN PROSTATIC HYPERPLASIA WITH URINARY HESITANCY: ICD-10-CM

## 2021-10-05 DIAGNOSIS — R48.2 APRAXIA OF SPEECH: ICD-10-CM

## 2021-10-05 DIAGNOSIS — N18.31 STAGE 3A CHRONIC KIDNEY DISEASE: ICD-10-CM

## 2021-10-05 DIAGNOSIS — E11.21 DIABETIC GLOMERULOPATHY: Primary | ICD-10-CM

## 2021-10-05 LAB
ALBUMIN/CREAT UR: 113 UG/MG (ref 0–30)
ANION GAP SERPL CALC-SCNC: 11 MMOL/L (ref 8–16)
BUN SERPL-MCNC: 26 MG/DL (ref 8–23)
CALCIUM SERPL-MCNC: 9.9 MG/DL (ref 8.7–10.5)
CHLORIDE SERPL-SCNC: 102 MMOL/L (ref 95–110)
CO2 SERPL-SCNC: 25 MMOL/L (ref 23–29)
COMPLEXED PSA SERPL-MCNC: 1.6 NG/ML (ref 0–4)
CREAT SERPL-MCNC: 1.6 MG/DL (ref 0.5–1.4)
CREAT UR-MCNC: 23 MG/DL (ref 23–375)
EST. GFR  (AFRICAN AMERICAN): 47 ML/MIN/1.73 M^2
EST. GFR  (NON AFRICAN AMERICAN): 41 ML/MIN/1.73 M^2
ESTIMATED AVG GLUCOSE: 183 MG/DL (ref 68–131)
GLUCOSE SERPL-MCNC: 217 MG/DL (ref 70–110)
HBA1C MFR BLD: 8 % (ref 4–5.6)
MICROALBUMIN UR DL<=1MG/L-MCNC: 26 UG/ML
POTASSIUM SERPL-SCNC: 4.7 MMOL/L (ref 3.5–5.1)
SODIUM SERPL-SCNC: 138 MMOL/L (ref 136–145)

## 2021-10-05 PROCEDURE — 1126F AMNT PAIN NOTED NONE PRSNT: CPT | Mod: CPTII,S$GLB,, | Performed by: FAMILY MEDICINE

## 2021-10-05 PROCEDURE — 80048 BASIC METABOLIC PNL TOTAL CA: CPT | Performed by: FAMILY MEDICINE

## 2021-10-05 PROCEDURE — 3288F PR FALLS RISK ASSESSMENT DOCUMENTED: ICD-10-PCS | Mod: CPTII,S$GLB,, | Performed by: FAMILY MEDICINE

## 2021-10-05 PROCEDURE — 3079F DIAST BP 80-89 MM HG: CPT | Mod: CPTII,S$GLB,, | Performed by: FAMILY MEDICINE

## 2021-10-05 PROCEDURE — 84153 ASSAY OF PSA TOTAL: CPT | Performed by: FAMILY MEDICINE

## 2021-10-05 PROCEDURE — 3288F FALL RISK ASSESSMENT DOCD: CPT | Mod: CPTII,S$GLB,, | Performed by: FAMILY MEDICINE

## 2021-10-05 PROCEDURE — 1101F PR PT FALLS ASSESS DOC 0-1 FALLS W/OUT INJ PAST YR: ICD-10-PCS | Mod: CPTII,S$GLB,, | Performed by: FAMILY MEDICINE

## 2021-10-05 PROCEDURE — 1159F MED LIST DOCD IN RCRD: CPT | Mod: CPTII,S$GLB,, | Performed by: FAMILY MEDICINE

## 2021-10-05 PROCEDURE — 3079F PR MOST RECENT DIASTOLIC BLOOD PRESSURE 80-89 MM HG: ICD-10-PCS | Mod: CPTII,S$GLB,, | Performed by: FAMILY MEDICINE

## 2021-10-05 PROCEDURE — 82570 ASSAY OF URINE CREATININE: CPT | Performed by: FAMILY MEDICINE

## 2021-10-05 PROCEDURE — 1126F PR PAIN SEVERITY QUANTIFIED, NO PAIN PRESENT: ICD-10-PCS | Mod: CPTII,S$GLB,, | Performed by: FAMILY MEDICINE

## 2021-10-05 PROCEDURE — 3074F SYST BP LT 130 MM HG: CPT | Mod: CPTII,S$GLB,, | Performed by: FAMILY MEDICINE

## 2021-10-05 PROCEDURE — 3074F PR MOST RECENT SYSTOLIC BLOOD PRESSURE < 130 MM HG: ICD-10-PCS | Mod: CPTII,S$GLB,, | Performed by: FAMILY MEDICINE

## 2021-10-05 PROCEDURE — 36415 PR COLLECTION VENOUS BLOOD,VENIPUNCTURE: ICD-10-PCS | Mod: ,,, | Performed by: FAMILY MEDICINE

## 2021-10-05 PROCEDURE — 99214 PR OFFICE/OUTPT VISIT, EST, LEVL IV, 30-39 MIN: ICD-10-PCS | Mod: S$GLB,,, | Performed by: FAMILY MEDICINE

## 2021-10-05 PROCEDURE — 83036 HEMOGLOBIN GLYCOSYLATED A1C: CPT | Performed by: FAMILY MEDICINE

## 2021-10-05 PROCEDURE — 99214 OFFICE O/P EST MOD 30 MIN: CPT | Mod: S$GLB,,, | Performed by: FAMILY MEDICINE

## 2021-10-05 PROCEDURE — 1159F PR MEDICATION LIST DOCUMENTED IN MEDICAL RECORD: ICD-10-PCS | Mod: CPTII,S$GLB,, | Performed by: FAMILY MEDICINE

## 2021-10-05 PROCEDURE — 1101F PT FALLS ASSESS-DOCD LE1/YR: CPT | Mod: CPTII,S$GLB,, | Performed by: FAMILY MEDICINE

## 2021-10-05 PROCEDURE — 36415 COLL VENOUS BLD VENIPUNCTURE: CPT | Mod: ,,, | Performed by: FAMILY MEDICINE

## 2021-10-05 RX ORDER — TAMSULOSIN HYDROCHLORIDE 0.4 MG/1
0.4 CAPSULE ORAL NIGHTLY
Qty: 90 CAPSULE | Refills: 3 | Status: SHIPPED | OUTPATIENT
Start: 2021-10-05 | End: 2022-01-04 | Stop reason: SDUPTHER

## 2021-10-07 ENCOUNTER — TELEPHONE (OUTPATIENT)
Dept: FAMILY MEDICINE | Facility: CLINIC | Age: 76
End: 2021-10-07

## 2021-10-12 ENCOUNTER — TELEPHONE (OUTPATIENT)
Dept: FAMILY MEDICINE | Facility: CLINIC | Age: 76
End: 2021-10-12

## 2021-10-21 ENCOUNTER — CLINICAL SUPPORT (OUTPATIENT)
Dept: FAMILY MEDICINE | Facility: CLINIC | Age: 76
End: 2021-10-21
Payer: MEDICARE

## 2021-10-21 DIAGNOSIS — Z23 NEEDS FLU SHOT: Primary | ICD-10-CM

## 2021-10-21 PROCEDURE — G0008 ADMIN INFLUENZA VIRUS VAC: HCPCS | Mod: S$GLB,,, | Performed by: FAMILY MEDICINE

## 2021-10-21 PROCEDURE — 90694 FLU VACCINE - QUADRIVALENT - ADJUVANTED: ICD-10-PCS | Mod: S$GLB,,, | Performed by: FAMILY MEDICINE

## 2021-10-21 PROCEDURE — G0008 FLU VACCINE - QUADRIVALENT - ADJUVANTED: ICD-10-PCS | Mod: S$GLB,,, | Performed by: FAMILY MEDICINE

## 2021-10-21 PROCEDURE — 99499 NO LOS: ICD-10-PCS | Mod: S$GLB,,, | Performed by: FAMILY MEDICINE

## 2021-10-21 PROCEDURE — 90694 VACC AIIV4 NO PRSRV 0.5ML IM: CPT | Mod: S$GLB,,, | Performed by: FAMILY MEDICINE

## 2021-10-21 PROCEDURE — 99499 UNLISTED E&M SERVICE: CPT | Mod: S$GLB,,, | Performed by: FAMILY MEDICINE

## 2021-11-30 ENCOUNTER — PATIENT MESSAGE (OUTPATIENT)
Dept: FAMILY MEDICINE | Facility: CLINIC | Age: 76
End: 2021-11-30
Payer: MEDICARE

## 2021-11-30 ENCOUNTER — TELEPHONE (OUTPATIENT)
Dept: FAMILY MEDICINE | Facility: CLINIC | Age: 76
End: 2021-11-30
Payer: MEDICARE

## 2021-12-01 ENCOUNTER — OFFICE VISIT (OUTPATIENT)
Dept: FAMILY MEDICINE | Facility: CLINIC | Age: 76
End: 2021-12-01
Payer: MEDICARE

## 2021-12-01 VITALS
BODY MASS INDEX: 31.75 KG/M2 | SYSTOLIC BLOOD PRESSURE: 122 MMHG | HEART RATE: 81 BPM | TEMPERATURE: 98 F | DIASTOLIC BLOOD PRESSURE: 74 MMHG | HEIGHT: 63 IN | OXYGEN SATURATION: 98 % | WEIGHT: 179.19 LBS | RESPIRATION RATE: 18 BRPM

## 2021-12-01 DIAGNOSIS — N18.32 TYPE 2 DIABETES MELLITUS WITH STAGE 3B CHRONIC KIDNEY DISEASE, WITHOUT LONG-TERM CURRENT USE OF INSULIN: Primary | ICD-10-CM

## 2021-12-01 DIAGNOSIS — R47.01 APHASIA: ICD-10-CM

## 2021-12-01 DIAGNOSIS — R30.0 DYSURIA: ICD-10-CM

## 2021-12-01 DIAGNOSIS — E11.22 TYPE 2 DIABETES MELLITUS WITH STAGE 3B CHRONIC KIDNEY DISEASE, WITHOUT LONG-TERM CURRENT USE OF INSULIN: Primary | ICD-10-CM

## 2021-12-01 LAB
BILIRUB SERPL-MCNC: NEGATIVE MG/DL
BLOOD URINE, POC: NEGATIVE
CLARITY, POC UA: CLEAR
COLOR, POC UA: YELLOW
GLUCOSE UR QL STRIP: NORMAL
KETONES UR QL STRIP: NEGATIVE
LEUKOCYTE ESTERASE URINE, POC: NEGATIVE
NITRITE, POC UA: NEGATIVE
PH, POC UA: 5.5
PROTEIN, POC: NEGATIVE
SPECIFIC GRAVITY, POC UA: >=1.03
UROBILINOGEN, POC UA: NORMAL

## 2021-12-01 PROCEDURE — 81002 POCT URINE DIPSTICK WITHOUT MICROSCOPE: ICD-10-PCS | Mod: S$GLB,,, | Performed by: STUDENT IN AN ORGANIZED HEALTH CARE EDUCATION/TRAINING PROGRAM

## 2021-12-01 PROCEDURE — 99214 OFFICE O/P EST MOD 30 MIN: CPT | Mod: S$GLB,,, | Performed by: STUDENT IN AN ORGANIZED HEALTH CARE EDUCATION/TRAINING PROGRAM

## 2021-12-01 PROCEDURE — 81002 URINALYSIS NONAUTO W/O SCOPE: CPT | Mod: S$GLB,,, | Performed by: STUDENT IN AN ORGANIZED HEALTH CARE EDUCATION/TRAINING PROGRAM

## 2021-12-01 PROCEDURE — 99214 PR OFFICE/OUTPT VISIT, EST, LEVL IV, 30-39 MIN: ICD-10-PCS | Mod: S$GLB,,, | Performed by: STUDENT IN AN ORGANIZED HEALTH CARE EDUCATION/TRAINING PROGRAM

## 2021-12-06 ENCOUNTER — TELEPHONE (OUTPATIENT)
Dept: FAMILY MEDICINE | Facility: CLINIC | Age: 76
End: 2021-12-06
Payer: MEDICARE

## 2021-12-06 DIAGNOSIS — E11.22 TYPE 2 DIABETES MELLITUS WITH STAGE 3B CHRONIC KIDNEY DISEASE, WITHOUT LONG-TERM CURRENT USE OF INSULIN: Primary | ICD-10-CM

## 2021-12-06 DIAGNOSIS — N18.32 TYPE 2 DIABETES MELLITUS WITH STAGE 3B CHRONIC KIDNEY DISEASE, WITHOUT LONG-TERM CURRENT USE OF INSULIN: Primary | ICD-10-CM

## 2021-12-06 RX ORDER — PEN NEEDLE, DIABETIC 30 GX3/16"
NEEDLE, DISPOSABLE MISCELLANEOUS
Qty: 30 EACH | Refills: 1 | Status: SHIPPED | OUTPATIENT
Start: 2021-12-06 | End: 2022-02-03 | Stop reason: SDUPTHER

## 2021-12-06 RX ORDER — INSULIN GLARGINE 100 [IU]/ML
5 INJECTION, SOLUTION SUBCUTANEOUS NIGHTLY
Qty: 3 ML | Refills: 1 | Status: SHIPPED | OUTPATIENT
Start: 2021-12-06 | End: 2021-12-15

## 2021-12-07 ENCOUNTER — TELEPHONE (OUTPATIENT)
Dept: FAMILY MEDICINE | Facility: CLINIC | Age: 76
End: 2021-12-07
Payer: MEDICARE

## 2021-12-15 ENCOUNTER — TELEPHONE (OUTPATIENT)
Dept: FAMILY MEDICINE | Facility: CLINIC | Age: 76
End: 2021-12-15

## 2021-12-15 DIAGNOSIS — N18.32 TYPE 2 DIABETES MELLITUS WITH STAGE 3B CHRONIC KIDNEY DISEASE, WITHOUT LONG-TERM CURRENT USE OF INSULIN: ICD-10-CM

## 2021-12-15 DIAGNOSIS — E11.22 TYPE 2 DIABETES MELLITUS WITH STAGE 3B CHRONIC KIDNEY DISEASE, WITHOUT LONG-TERM CURRENT USE OF INSULIN: ICD-10-CM

## 2021-12-15 RX ORDER — INSULIN GLARGINE 100 [IU]/ML
10 INJECTION, SOLUTION SUBCUTANEOUS NIGHTLY
Qty: 3 ML | Refills: 1
Start: 2021-12-15 | End: 2022-02-03 | Stop reason: SDUPTHER

## 2021-12-16 ENCOUNTER — TELEPHONE (OUTPATIENT)
Dept: FAMILY MEDICINE | Facility: CLINIC | Age: 76
End: 2021-12-16
Payer: MEDICARE

## 2021-12-21 DIAGNOSIS — I50.9 CONGESTIVE HEART FAILURE, UNSPECIFIED HF CHRONICITY, UNSPECIFIED HEART FAILURE TYPE: ICD-10-CM

## 2021-12-21 DIAGNOSIS — E78.5 HYPERLIPIDEMIA, UNSPECIFIED HYPERLIPIDEMIA TYPE: ICD-10-CM

## 2021-12-21 RX ORDER — PRAVASTATIN SODIUM 40 MG/1
40 TABLET ORAL DAILY
Qty: 90 TABLET | Refills: 3 | Status: SHIPPED | OUTPATIENT
Start: 2021-12-21 | End: 2021-12-22 | Stop reason: SDUPTHER

## 2021-12-21 RX ORDER — FUROSEMIDE 20 MG/1
20 TABLET ORAL 2 TIMES DAILY
Qty: 180 TABLET | Refills: 3 | Status: SHIPPED | OUTPATIENT
Start: 2021-12-21 | End: 2021-12-22 | Stop reason: SDUPTHER

## 2021-12-22 DIAGNOSIS — I50.9 CONGESTIVE HEART FAILURE, UNSPECIFIED HF CHRONICITY, UNSPECIFIED HEART FAILURE TYPE: ICD-10-CM

## 2021-12-22 DIAGNOSIS — E78.5 HYPERLIPIDEMIA, UNSPECIFIED HYPERLIPIDEMIA TYPE: ICD-10-CM

## 2022-01-03 RX ORDER — PRAVASTATIN SODIUM 40 MG/1
40 TABLET ORAL DAILY
Qty: 90 TABLET | Refills: 3 | Status: SHIPPED | OUTPATIENT
Start: 2022-01-03 | End: 2022-01-04 | Stop reason: SDUPTHER

## 2022-01-03 RX ORDER — FUROSEMIDE 20 MG/1
20 TABLET ORAL 2 TIMES DAILY
Qty: 180 TABLET | Refills: 3 | Status: SHIPPED | OUTPATIENT
Start: 2022-01-03 | End: 2022-01-04 | Stop reason: SDUPTHER

## 2022-01-04 ENCOUNTER — OFFICE VISIT (OUTPATIENT)
Dept: FAMILY MEDICINE | Facility: CLINIC | Age: 77
End: 2022-01-04
Payer: MEDICARE

## 2022-01-04 ENCOUNTER — LAB VISIT (OUTPATIENT)
Dept: LAB | Facility: CLINIC | Age: 77
End: 2022-01-04
Payer: MEDICARE

## 2022-01-04 VITALS
SYSTOLIC BLOOD PRESSURE: 124 MMHG | DIASTOLIC BLOOD PRESSURE: 78 MMHG | TEMPERATURE: 98 F | BODY MASS INDEX: 32.59 KG/M2 | WEIGHT: 184 LBS | HEART RATE: 80 BPM | OXYGEN SATURATION: 95 %

## 2022-01-04 DIAGNOSIS — N40.1 BENIGN PROSTATIC HYPERPLASIA WITH URINARY HESITANCY: ICD-10-CM

## 2022-01-04 DIAGNOSIS — E11.21 DIABETIC GLOMERULOPATHY: ICD-10-CM

## 2022-01-04 DIAGNOSIS — N18.32 TYPE 2 DIABETES MELLITUS WITH STAGE 3B CHRONIC KIDNEY DISEASE, WITHOUT LONG-TERM CURRENT USE OF INSULIN: ICD-10-CM

## 2022-01-04 DIAGNOSIS — E11.22 TYPE 2 DIABETES MELLITUS WITH STAGE 3B CHRONIC KIDNEY DISEASE, WITHOUT LONG-TERM CURRENT USE OF INSULIN: ICD-10-CM

## 2022-01-04 DIAGNOSIS — K21.9 GASTROESOPHAGEAL REFLUX DISEASE, UNSPECIFIED WHETHER ESOPHAGITIS PRESENT: ICD-10-CM

## 2022-01-04 DIAGNOSIS — E78.5 HYPERLIPIDEMIA, UNSPECIFIED HYPERLIPIDEMIA TYPE: ICD-10-CM

## 2022-01-04 DIAGNOSIS — I50.9 CONGESTIVE HEART FAILURE, UNSPECIFIED HF CHRONICITY, UNSPECIFIED HEART FAILURE TYPE: ICD-10-CM

## 2022-01-04 DIAGNOSIS — R39.11 BENIGN PROSTATIC HYPERPLASIA WITH URINARY HESITANCY: ICD-10-CM

## 2022-01-04 DIAGNOSIS — F41.9 ANXIETY DISORDER, UNSPECIFIED TYPE: ICD-10-CM

## 2022-01-04 DIAGNOSIS — N18.32 TYPE 2 DIABETES MELLITUS WITH STAGE 3B CHRONIC KIDNEY DISEASE, WITHOUT LONG-TERM CURRENT USE OF INSULIN: Primary | ICD-10-CM

## 2022-01-04 DIAGNOSIS — I48.0 PAROXYSMAL ATRIAL FIBRILLATION: ICD-10-CM

## 2022-01-04 DIAGNOSIS — I66.02 OCCLUSION AND STENOSIS OF LEFT MIDDLE CEREBRAL ARTERY: ICD-10-CM

## 2022-01-04 DIAGNOSIS — I10 ESSENTIAL HYPERTENSION: ICD-10-CM

## 2022-01-04 DIAGNOSIS — E11.22 TYPE 2 DIABETES MELLITUS WITH STAGE 3B CHRONIC KIDNEY DISEASE, WITHOUT LONG-TERM CURRENT USE OF INSULIN: Primary | ICD-10-CM

## 2022-01-04 LAB
ESTIMATED AVG GLUCOSE: 200 MG/DL (ref 68–131)
HBA1C MFR BLD: 8.6 % (ref 4–5.6)

## 2022-01-04 PROCEDURE — 3052F HG A1C>EQUAL 8.0%<EQUAL 9.0%: CPT | Mod: CPTII,S$GLB,, | Performed by: FAMILY MEDICINE

## 2022-01-04 PROCEDURE — 1101F PR PT FALLS ASSESS DOC 0-1 FALLS W/OUT INJ PAST YR: ICD-10-PCS | Mod: CPTII,S$GLB,, | Performed by: FAMILY MEDICINE

## 2022-01-04 PROCEDURE — 99999 PR PBB SHADOW E&M-EST. PATIENT-LVL III: CPT | Mod: PBBFAC,,, | Performed by: FAMILY MEDICINE

## 2022-01-04 PROCEDURE — 99999 PR PBB SHADOW E&M-EST. PATIENT-LVL III: ICD-10-PCS | Mod: PBBFAC,,, | Performed by: FAMILY MEDICINE

## 2022-01-04 PROCEDURE — 3288F FALL RISK ASSESSMENT DOCD: CPT | Mod: CPTII,S$GLB,, | Performed by: FAMILY MEDICINE

## 2022-01-04 PROCEDURE — 1159F PR MEDICATION LIST DOCUMENTED IN MEDICAL RECORD: ICD-10-PCS | Mod: CPTII,S$GLB,, | Performed by: FAMILY MEDICINE

## 2022-01-04 PROCEDURE — 1101F PT FALLS ASSESS-DOCD LE1/YR: CPT | Mod: CPTII,S$GLB,, | Performed by: FAMILY MEDICINE

## 2022-01-04 PROCEDURE — 3052F PR MOST RECENT HEMOGLOBIN A1C LEVEL 8.0 - < 9.0%: ICD-10-PCS | Mod: CPTII,S$GLB,, | Performed by: FAMILY MEDICINE

## 2022-01-04 PROCEDURE — 3288F PR FALLS RISK ASSESSMENT DOCUMENTED: ICD-10-PCS | Mod: CPTII,S$GLB,, | Performed by: FAMILY MEDICINE

## 2022-01-04 PROCEDURE — 3074F PR MOST RECENT SYSTOLIC BLOOD PRESSURE < 130 MM HG: ICD-10-PCS | Mod: CPTII,S$GLB,, | Performed by: FAMILY MEDICINE

## 2022-01-04 PROCEDURE — 36415 COLL VENOUS BLD VENIPUNCTURE: CPT | Mod: PN,,, | Performed by: FAMILY MEDICINE

## 2022-01-04 PROCEDURE — 83036 HEMOGLOBIN GLYCOSYLATED A1C: CPT | Performed by: FAMILY MEDICINE

## 2022-01-04 PROCEDURE — 99214 PR OFFICE/OUTPT VISIT, EST, LEVL IV, 30-39 MIN: ICD-10-PCS | Mod: S$GLB,,, | Performed by: FAMILY MEDICINE

## 2022-01-04 PROCEDURE — 3074F SYST BP LT 130 MM HG: CPT | Mod: CPTII,S$GLB,, | Performed by: FAMILY MEDICINE

## 2022-01-04 PROCEDURE — 99214 OFFICE O/P EST MOD 30 MIN: CPT | Mod: S$GLB,,, | Performed by: FAMILY MEDICINE

## 2022-01-04 PROCEDURE — 3078F PR MOST RECENT DIASTOLIC BLOOD PRESSURE < 80 MM HG: ICD-10-PCS | Mod: CPTII,S$GLB,, | Performed by: FAMILY MEDICINE

## 2022-01-04 PROCEDURE — 3078F DIAST BP <80 MM HG: CPT | Mod: CPTII,S$GLB,, | Performed by: FAMILY MEDICINE

## 2022-01-04 PROCEDURE — 36415 PR COLLECTION VENOUS BLOOD,VENIPUNCTURE: ICD-10-PCS | Mod: PN,,, | Performed by: FAMILY MEDICINE

## 2022-01-04 PROCEDURE — 1159F MED LIST DOCD IN RCRD: CPT | Mod: CPTII,S$GLB,, | Performed by: FAMILY MEDICINE

## 2022-01-04 RX ORDER — PANTOPRAZOLE SODIUM 40 MG/1
40 TABLET, DELAYED RELEASE ORAL DAILY
Qty: 90 TABLET | Refills: 3 | Status: SHIPPED | OUTPATIENT
Start: 2022-01-04 | End: 2023-01-12 | Stop reason: SDUPTHER

## 2022-01-04 RX ORDER — DILTIAZEM HYDROCHLORIDE 60 MG/1
60 TABLET, FILM COATED ORAL 2 TIMES DAILY
Qty: 180 TABLET | Refills: 3 | Status: SHIPPED | OUTPATIENT
Start: 2022-01-04 | End: 2022-11-08

## 2022-01-04 RX ORDER — TERAZOSIN 2 MG/1
2 CAPSULE ORAL NIGHTLY
Qty: 90 CAPSULE | Refills: 3 | Status: SHIPPED | OUTPATIENT
Start: 2022-01-04 | End: 2022-11-08

## 2022-01-04 RX ORDER — PRAVASTATIN SODIUM 40 MG/1
40 TABLET ORAL DAILY
Qty: 90 TABLET | Refills: 3 | Status: SHIPPED | OUTPATIENT
Start: 2022-01-04 | End: 2023-01-12 | Stop reason: SDUPTHER

## 2022-01-04 RX ORDER — METOPROLOL TARTRATE 100 MG/1
100 TABLET ORAL 2 TIMES DAILY
Qty: 180 TABLET | Refills: 3 | Status: SHIPPED | OUTPATIENT
Start: 2022-01-04 | End: 2023-01-12 | Stop reason: SDUPTHER

## 2022-01-04 RX ORDER — HYDROXYZINE HYDROCHLORIDE 25 MG/1
25 TABLET, FILM COATED ORAL 2 TIMES DAILY
Qty: 180 TABLET | Refills: 3 | Status: SHIPPED | OUTPATIENT
Start: 2022-01-04 | End: 2022-11-08

## 2022-01-04 RX ORDER — QUETIAPINE FUMARATE 50 MG/1
50 TABLET, FILM COATED ORAL NIGHTLY
Qty: 90 TABLET | Refills: 3 | Status: SHIPPED | OUTPATIENT
Start: 2022-01-04 | End: 2023-01-12 | Stop reason: SDUPTHER

## 2022-01-04 RX ORDER — TAMSULOSIN HYDROCHLORIDE 0.4 MG/1
0.4 CAPSULE ORAL NIGHTLY
Qty: 90 CAPSULE | Refills: 3 | Status: SHIPPED | OUTPATIENT
Start: 2022-01-04 | End: 2023-02-09 | Stop reason: SDUPTHER

## 2022-01-04 RX ORDER — FUROSEMIDE 20 MG/1
20 TABLET ORAL 2 TIMES DAILY
Qty: 180 TABLET | Refills: 3 | Status: SHIPPED | OUTPATIENT
Start: 2022-01-04 | End: 2022-11-08

## 2022-01-04 RX ORDER — CANDESARTAN 8 MG/1
8 TABLET ORAL DAILY
Qty: 90 TABLET | Refills: 3 | Status: SHIPPED | OUTPATIENT
Start: 2022-01-04 | End: 2022-11-08

## 2022-01-04 RX ORDER — GLIMEPIRIDE 2 MG/1
TABLET ORAL
Qty: 270 TABLET | Refills: 3 | Status: CANCELLED | OUTPATIENT
Start: 2022-01-04

## 2022-01-04 RX ORDER — INSULIN GLARGINE 100 [IU]/ML
10 INJECTION, SOLUTION SUBCUTANEOUS NIGHTLY
Qty: 3 ML | Refills: 1 | Status: CANCELLED
Start: 2022-01-04 | End: 2023-01-04

## 2022-01-04 NOTE — PROGRESS NOTES
SUBJECTIVE:    Patient ID: Agapito Bass is a 76 y.o. male.    Chief Complaint: Follow-up, Cough (Pt present today for routine f/u visit but also reports cough, headache, sore throat, and congestion x 6 days), Headache, Sore Throat, and Nasal Congestion    76-year-old man status post CVA with expressive aphasia is seen in follow-up for diabetes.  He additionally has complained of a cough and congestion with a low-grade temp which is actually improving.  He was started on insulin on 12/07/2071 consisting of Lantus 10 units q.d..  His last A1c had increased 8% from a prior level 6.7%   he is also on metformin 500 -and  1 a day.  He is on some glimepiride 4 mg in the morning and 2 mg in the evening .  His diary is reviewed.  Fasting blood sugars range 114 to 182 with a median of 129-154.  His p.m. postprandial glucoses range from 116-83.   Median is 144-199.        Office Visit on 12/01/2021   Component Date Value Ref Range Status    Color, UA 12/01/2021 Yellow   Final    pH, UA 12/01/2021 5.5   Final    WBC, UA 12/01/2021 negative   Final    Nitrite, UA 12/01/2021 negative   Final    Protein, UA 12/01/2021 negative   Final    Glucose, UA 12/01/2021 100 mg/dl   Final    Ketones, UA 12/01/2021 negative   Final    Urobilinogen, UA 12/01/2021 0.2 E.U./dL   Final    Bilirubin, UA 12/01/2021 negative   Final    Blood, UA 12/01/2021 negative   Final    Clarity, UA 12/01/2021 Clear   Final    Spec Grav UA 12/01/2021 >=1.030   Final   Lab Visit on 10/05/2021   Component Date Value Ref Range Status    Hemoglobin A1C 10/05/2021 8.0* 4.0 - 5.6 % Final    Estimated Avg Glucose 10/05/2021 183* 68 - 131 mg/dL Final    PSA, Screen 10/05/2021 1.6  0.00 - 4.00 ng/mL Final    Sodium 10/05/2021 138  136 - 145 mmol/L Final    Potassium 10/05/2021 4.7  3.5 - 5.1 mmol/L Final    Chloride 10/05/2021 102  95 - 110 mmol/L Final    CO2 10/05/2021 25  23 - 29 mmol/L Final    Glucose 10/05/2021 217* 70 - 110 mg/dL Final     BUN 10/05/2021 26* 8 - 23 mg/dL Final    Creatinine 10/05/2021 1.6* 0.5 - 1.4 mg/dL Final    Calcium 10/05/2021 9.9  8.7 - 10.5 mg/dL Final    Anion Gap 10/05/2021 11  8 - 16 mmol/L Final    eGFR if  10/05/2021 47* >60 mL/min/1.73 m^2 Final    eGFR if non African American 10/05/2021 41* >60 mL/min/1.73 m^2 Final    Microalbumin, Urine 10/05/2021 26.0  ug/mL Final    Creatinine, Urine 10/05/2021 23.0  23.0 - 375.0 mg/dL Final    Microalb/Creat Ratio 10/05/2021 113.0* 0.0 - 30.0 ug/mg Final   Patient Outreach on 08/18/2021   Component Date Value Ref Range Status    Left Eye DM Retinopathy 08/16/2021 Negative   Final    Right Eye DM Retinopathy 08/16/2021 Negative   Final       Past Medical History:   Diagnosis Date    CHF (congestive heart failure) 10/17/2019    Hyperlipemia, mixed     Hypertension     Stroke (cerebrum)      Social History     Socioeconomic History    Marital status:    Occupational History    Occupation: retired   Tobacco Use    Smoking status: Never Smoker    Smokeless tobacco: Former User     Types: Chew   Substance and Sexual Activity    Alcohol use: Not Currently    Drug use: Not Currently    Sexual activity: Not Currently     Past Surgical History:   Procedure Laterality Date    ANGIOGRAPHY  10/01/2019    APPENDECTOMY  1952    CARDIAC CATHETERIZATION  07/20/2019    CARDIAC DEFIBRILLATOR PLACEMENT  03/13/2020    COLONOSCOPY  01/01/2015    ECHOCARDIOGRAPHY  07/20/2019    HERNIA REPAIR  2002     Family History   Problem Relation Age of Onset    Lung cancer Mother        Review of patient's allergies indicates:   Allergen Reactions    Invokana [canagliflozin]     Trulicity [dulaglutide]        Current Outpatient Medications:     ACCU-CHEK SOFTCLIX LANCETS MISC, Accu-Chek Softclix Lancets, Disp: , Rfl:     apixaban (ELIQUIS) 5 mg Tab, Take 1 tablet (5 mg total) by mouth 2 (two) times daily., Disp: 180 tablet, Rfl: 3    ascorbic acid, vitamin C,  "500 mg/5 mL Liqd, ascorbic acid (vitamin C) 500 mg/5 mL oral liquid  once daily., Disp: , Rfl:     blood sugar diagnostic (BLOOD GLUCOSE TEST) Strp, 3 strips by Misc.(Non-Drug; Combo Route) route once daily., Disp: 270 strip, Rfl: 3    blood-glucose meter (ACCU-CHEK MATHEW PLUS METER MISC), Accu-Chek Mathew Plus Meter, Disp: , Rfl:     candesartan (ATACAND) 8 MG tablet, Take 1 tablet (8 mg total) by mouth once daily., Disp: 90 tablet, Rfl: 3    cholecalciferol, vitamin D3, 125 mcg (5,000 unit) Tab, vitamin d 5000 iu, Disp: , Rfl:     diltiaZEM (CARDIZEM) 60 MG tablet, Take 1 tablet (60 mg total) by mouth 2 (two) times a day., Disp: 180 tablet, Rfl: 3    docusate sodium (COLACE) 100 MG capsule, 100 mg., Disp: , Rfl:     ELIQUIS 5 mg Tab, , Disp: , Rfl:     furosemide (LASIX) 20 MG tablet, Take 1 tablet (20 mg total) by mouth 2 (two) times a day., Disp: 180 tablet, Rfl: 3    glimepiride (AMARYL) 2 MG tablet, 2 q am and 1 q pm, Disp: 270 tablet, Rfl: 3    hydrOXYzine HCL (ATARAX) 25 MG tablet, Take 1 tablet (25 mg total) by mouth 2 (two) times a day., Disp: 180 tablet, Rfl: 3    insulin (LANTUS SOLOSTAR U-100 INSULIN) glargine 100 units/mL (3mL) SubQ pen, Inject 10 Units into the skin every evening., Disp: 3 mL, Rfl: 1    metFORMIN (GLUCOPHAGE-XR) 500 MG ER 24hr tablet, 1 q am and 2 q hs (Patient taking differently: Take 500 mg by mouth once daily.), Disp: 270 tablet, Rfl: 3    metoprolol tartrate (LOPRESSOR) 100 MG tablet, Take 1 tablet (100 mg total) by mouth 2 (two) times daily., Disp: 180 tablet, Rfl: 3    multivitamin with minerals tablet, complete senior vitamins and m, Disp: , Rfl:     pantoprazole (PROTONIX) 40 MG tablet, Take 1 tablet (40 mg total) by mouth once daily., Disp: 90 tablet, Rfl: 3    pen needle, diabetic 32 gauge x 5/32" Ndle, Use daily with lantus injection at night for diabetes with hyperglycemia, Disp: 30 each, Rfl: 1    pravastatin (PRAVACHOL) 40 MG tablet, Take 1 tablet (40 mg " total) by mouth once daily., Disp: 90 tablet, Rfl: 3    QUEtiapine (SEROQUEL) 50 MG tablet, Take 1 tablet (50 mg total) by mouth nightly., Disp: 90 tablet, Rfl: 3    tamsulosin (FLOMAX) 0.4 mg Cap, Take 1 capsule (0.4 mg total) by mouth every evening., Disp: 90 capsule, Rfl: 3    terazosin (HYTRIN) 2 MG capsule, Take 1 capsule (2 mg total) by mouth nightly., Disp: 90 capsule, Rfl: 3    Review of Systems   Constitutional: Negative for activity change and appetite change.        Constitutional:        No chest pain   no edema  No orthostatic symptoms.     Cardiovascular: Negative for chest pain, palpitations and leg swelling.   Genitourinary:        Nocturia x 3   BPH symptoms of daily frequency and urgency  No dysuria          Respiratory: Negative for chest tightness and shortness of breath.    Cardiovascular: Negative for chest pain and palpitations.   Gastrointestinal: Negative for abdominal pain.   Endocrine: Positive for polyuria. Negative for polydipsia.   Genitourinary: Positive for bladder incontinence and frequency. Negative for difficulty urinating, dysuria and hematuria.        Nocturia x 2   Integumentary:  Negative for rash.   Psychiatric/Behavioral: Negative for sleep disturbance.           Objective:      Vitals:    01/04/22 1004   BP: 124/78   Pulse: 80   Temp: 98.3 °F (36.8 °C)   TempSrc: Oral   SpO2: 95%   Weight: 83.5 kg (184 lb)     Physical Exam  Vitals reviewed.   Constitutional:       Comments: Physical Exam  Vitals and nursing note reviewed.   Constitutional:       Appearance: Normal appearance.   Neck:      Vascular: No carotid bruit.   Cardiovascular:      Rate and Rhythm: Normal rate and regular rhythm.      Heart sounds: Normal heart sounds.      Comments: RRR AT TIME OF EXAM  Pulmonary:      Effort: Pulmonary effort is normal.      Breath sounds: Normal breath sounds.   Musculoskeletal:      Right lower leg: No edema.      Left lower leg: No edema.   Neurological:      Mental Status:  He is alert.      Comments: Minimal rt sided weakness   Expressive aphasia             Assessment:       1. Type 2 diabetes mellitus with stage 3b chronic kidney disease, without long-term current use of insulin    2. Paroxysmal atrial fibrillation    3. Congestive heart failure, unspecified HF chronicity, unspecified heart failure type    4. Anxiety disorder, unspecified type    5. Diabetic glomerulopathy    6. Gastroesophageal reflux disease, unspecified whether esophagitis present    7. Hyperlipidemia, unspecified hyperlipidemia type    8. 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 Benign prostatic hyperplasia with urinary hesitancy    10. Benign prostatic hyperplasia without lower urinary tract symptoms    11. Essential hypertension         Plan:       Type 2 diabetes mellitus with stage 3b chronic kidney disease, without long-term current use of insulin  -     blood sugar diagnostic (BLOOD GLUCOSE TEST) Strp; 3 strips by Misc.(Non-Drug; Combo Route) route once daily.  Dispense: 270 strip; Refill: 3  -     Hemoglobin A1C; Future; Expected date: 01/04/2022    Paroxysmal atrial fibrillation  -     apixaban (ELIQUIS) 5 mg Tab; Take 1 tablet (5 mg total) by mouth 2 (two) times daily.  Dispense: 180 tablet; Refill: 3  -     diltiaZEM (CARDIZEM) 60 MG tablet; Take 1 tablet (60 mg total) by mouth 2 (two) times a day.  Dispense: 180 tablet; Refill: 3    Congestive heart failure, unspecified HF chronicity, unspecified heart failure type  -     furosemide (LASIX) 20 MG tablet; Take 1 tablet (20 mg total) by mouth 2 (two) times a day.  Dispense: 180 tablet; Refill: 3    Anxiety disorder, unspecified type  -     hydrOXYzine HCL (ATARAX) 25 MG tablet; Take 1 tablet (25 mg total) by mouth 2 (two) times a day.  Dispense: 180 tablet; Refill: 3    Diabetic glomerulopathy    Gastroesophageal reflux disease, unspecified whether esophagitis present  -     pantoprazole (PROTONIX) 40 MG tablet; Take 1 tablet (40 mg  total) by mouth once daily.  Dispense: 90 tablet; Refill: 3    Hyperlipidemia, unspecified hyperlipidemia type  -     pravastatin (PRAVACHOL) 40 MG tablet; Take 1 tablet (40 mg total) by mouth once daily.  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  Dispense: 90 tablet; Refill: 3    Benign prostatic hyperplasia with urinary hesitancy  -     tamsulosin (FLOMAX) 0.4 mg Cap; Take 1 capsule (0.4 mg total) by mouth every evening.  Dispense: 90 capsule; Refill: 3  -     terazosin (HYTRIN) 2 MG capsule; Take 1 capsule (2 mg total) by mouth nightly.  Dispense: 90 capsule; Refill: 3    Benign prostatic hyperplasia without lower urinary tract symptoms    Essential hypertension  -     candesartan (ATACAND) 8 MG tablet; Take 1 tablet (8 mg total) by mouth once daily.  Dispense: 90 tablet; Refill: 3  -     metoprolol tartrate (LOPRESSOR) 100 MG tablet; Take 1 tablet (100 mg total) by mouth 2 (two) times daily.  Dispense: 180 tablet; Refill: 3  -     terazosin (HYTRIN) 2 MG capsule; Take 1 capsule (2 mg total) by mouth nightly.  Dispense: 90 capsule; Refill: 3    decrease Amaryl to 2 mg  -- 1  In am   Increase Lantus to 20 units a day   Continue metformin 1 a day  Maintain Diary   Ge tLab today -(A1c)    Follow up in about 1 month (around 2/4/2022).        1/4/2022 César Mejia M.D.

## 2022-01-04 NOTE — PATIENT INSTRUCTIONS
decrease Amaryl to 2 mg  -- 1  In am   Increase Lantus to 20 units a day   Continue metformin 1 a day  Maintain Diary   GetLab today -(A1c)

## 2022-01-06 DIAGNOSIS — I10 ESSENTIAL HYPERTENSION: ICD-10-CM

## 2022-01-06 DIAGNOSIS — N40.1 BENIGN PROSTATIC HYPERPLASIA WITH URINARY HESITANCY: ICD-10-CM

## 2022-01-06 DIAGNOSIS — R39.11 BENIGN PROSTATIC HYPERPLASIA WITH URINARY HESITANCY: ICD-10-CM

## 2022-01-12 ENCOUNTER — TELEPHONE (OUTPATIENT)
Dept: FAMILY MEDICINE | Facility: CLINIC | Age: 77
End: 2022-01-12
Payer: MEDICARE

## 2022-01-12 NOTE — TELEPHONE ENCOUNTER
----- Message from Brayden Fierro sent at 1/12/2022 10:02 AM CST -----  Type: Needs Medical Advice  Who Called:  Pt's wife - Hanna Burr Call Back Number: [phone]  Additional Information: Needs to speak to someone asap having issues with ins filling Rx .  Please advise -- Thank you

## 2022-01-12 NOTE — TELEPHONE ENCOUNTER
spoke to Hanna Rodgers will not fill pt terazosin and tests strips,when I return to that clinic tomorrow I will contact Vishal.

## 2022-01-13 NOTE — TELEPHONE ENCOUNTER
----- Message from Brayden Fierro sent at 1/13/2022  8:55 AM CST -----  Type:  Pharmacy Calling to Clarify an RX    Name of Caller:  Wilmer   Pharmacy Name:  [facility]  Prescription Name:  terazosin and tests strips  What do they need to clarify?:  current therapy  Best Call Back Number:  [phone]  Additional Information:  Please advise -- Thank you

## 2022-01-13 NOTE — TELEPHONE ENCOUNTER
Called [facility] [phone] clarified flomax and hytrin,pt takes both.They will fill rx.pt notified

## 2022-02-03 ENCOUNTER — OFFICE VISIT (OUTPATIENT)
Dept: FAMILY MEDICINE | Facility: CLINIC | Age: 77
End: 2022-02-03
Payer: MEDICARE

## 2022-02-03 VITALS
HEIGHT: 63 IN | TEMPERATURE: 99 F | WEIGHT: 188 LBS | HEART RATE: 72 BPM | OXYGEN SATURATION: 96 % | DIASTOLIC BLOOD PRESSURE: 80 MMHG | SYSTOLIC BLOOD PRESSURE: 128 MMHG | BODY MASS INDEX: 33.31 KG/M2

## 2022-02-03 DIAGNOSIS — N18.32 TYPE 2 DIABETES MELLITUS WITH STAGE 3B CHRONIC KIDNEY DISEASE, WITHOUT LONG-TERM CURRENT USE OF INSULIN: ICD-10-CM

## 2022-02-03 DIAGNOSIS — E11.22 TYPE 2 DIABETES MELLITUS WITH STAGE 3B CHRONIC KIDNEY DISEASE, WITHOUT LONG-TERM CURRENT USE OF INSULIN: ICD-10-CM

## 2022-02-03 DIAGNOSIS — F33.42 MAJOR DEPRESSIVE DISORDER, RECURRENT EPISODE, IN FULL REMISSION: ICD-10-CM

## 2022-02-03 DIAGNOSIS — I69.351 HEMIPARESIS AFFECTING RIGHT SIDE AS LATE EFFECT OF CEREBROVASCULAR ACCIDENT: ICD-10-CM

## 2022-02-03 LAB — GLUCOSE SERPL-MCNC: 115 MG/DL (ref 70–110)

## 2022-02-03 PROCEDURE — 3074F PR MOST RECENT SYSTOLIC BLOOD PRESSURE < 130 MM HG: ICD-10-PCS | Mod: CPTII,S$GLB,, | Performed by: FAMILY MEDICINE

## 2022-02-03 PROCEDURE — 82962 GLUCOSE BLOOD TEST: CPT | Mod: S$GLB,,, | Performed by: FAMILY MEDICINE

## 2022-02-03 PROCEDURE — 1126F AMNT PAIN NOTED NONE PRSNT: CPT | Mod: CPTII,S$GLB,, | Performed by: FAMILY MEDICINE

## 2022-02-03 PROCEDURE — 99999 PR PBB SHADOW E&M-EST. PATIENT-LVL III: ICD-10-PCS | Mod: PBBFAC,,, | Performed by: FAMILY MEDICINE

## 2022-02-03 PROCEDURE — 3079F DIAST BP 80-89 MM HG: CPT | Mod: CPTII,S$GLB,, | Performed by: FAMILY MEDICINE

## 2022-02-03 PROCEDURE — 1126F PR PAIN SEVERITY QUANTIFIED, NO PAIN PRESENT: ICD-10-PCS | Mod: CPTII,S$GLB,, | Performed by: FAMILY MEDICINE

## 2022-02-03 PROCEDURE — 99999 PR PBB SHADOW E&M-EST. PATIENT-LVL III: CPT | Mod: PBBFAC,,, | Performed by: FAMILY MEDICINE

## 2022-02-03 PROCEDURE — 3079F PR MOST RECENT DIASTOLIC BLOOD PRESSURE 80-89 MM HG: ICD-10-PCS | Mod: CPTII,S$GLB,, | Performed by: FAMILY MEDICINE

## 2022-02-03 PROCEDURE — 3052F PR MOST RECENT HEMOGLOBIN A1C LEVEL 8.0 - < 9.0%: ICD-10-PCS | Mod: CPTII,S$GLB,, | Performed by: FAMILY MEDICINE

## 2022-02-03 PROCEDURE — 99214 OFFICE O/P EST MOD 30 MIN: CPT | Mod: S$GLB,,, | Performed by: FAMILY MEDICINE

## 2022-02-03 PROCEDURE — 1101F PT FALLS ASSESS-DOCD LE1/YR: CPT | Mod: CPTII,S$GLB,, | Performed by: FAMILY MEDICINE

## 2022-02-03 PROCEDURE — 99214 PR OFFICE/OUTPT VISIT, EST, LEVL IV, 30-39 MIN: ICD-10-PCS | Mod: S$GLB,,, | Performed by: FAMILY MEDICINE

## 2022-02-03 PROCEDURE — 3052F HG A1C>EQUAL 8.0%<EQUAL 9.0%: CPT | Mod: CPTII,S$GLB,, | Performed by: FAMILY MEDICINE

## 2022-02-03 PROCEDURE — 3074F SYST BP LT 130 MM HG: CPT | Mod: CPTII,S$GLB,, | Performed by: FAMILY MEDICINE

## 2022-02-03 PROCEDURE — 3288F FALL RISK ASSESSMENT DOCD: CPT | Mod: CPTII,S$GLB,, | Performed by: FAMILY MEDICINE

## 2022-02-03 PROCEDURE — 82962 POCT GLUCOSE, HAND-HELD DEVICE: ICD-10-PCS | Mod: S$GLB,,, | Performed by: FAMILY MEDICINE

## 2022-02-03 PROCEDURE — 3288F PR FALLS RISK ASSESSMENT DOCUMENTED: ICD-10-PCS | Mod: CPTII,S$GLB,, | Performed by: FAMILY MEDICINE

## 2022-02-03 PROCEDURE — 1101F PR PT FALLS ASSESS DOC 0-1 FALLS W/OUT INJ PAST YR: ICD-10-PCS | Mod: CPTII,S$GLB,, | Performed by: FAMILY MEDICINE

## 2022-02-03 RX ORDER — PEN NEEDLE, DIABETIC 30 GX3/16"
NEEDLE, DISPOSABLE MISCELLANEOUS
Qty: 30 EACH | Refills: 1 | Status: SHIPPED | OUTPATIENT
Start: 2022-02-03 | End: 2022-05-24 | Stop reason: SDUPTHER

## 2022-02-03 RX ORDER — TERAZOSIN 2 MG/1
2 CAPSULE ORAL NIGHTLY
Qty: 90 CAPSULE | Refills: 3 | OUTPATIENT
Start: 2022-02-03

## 2022-02-03 RX ORDER — INSULIN GLARGINE 100 [IU]/ML
25 INJECTION, SOLUTION SUBCUTANEOUS NIGHTLY
Qty: 7.5 ML | Refills: 11
Start: 2022-02-03 | End: 2022-02-10

## 2022-02-03 RX ORDER — INSULIN GLARGINE 100 [IU]/ML
25 INJECTION, SOLUTION SUBCUTANEOUS NIGHTLY
Start: 2022-02-03 | End: 2022-02-03

## 2022-02-03 NOTE — PROGRESS NOTES
SUBJECTIVE:    Patient ID: Agapito Bass is a 76 y.o. male.    Chief Complaint: Follow-up and Diabetes (See a1c and home blood sugar readings)    Diabetic follow-up this 76-year-old male.  He has recently been started on Lantus insulin.  At last visit his glimepiride was decreased and Lantus was increased to 20 units q.d..  He states he is feels better.  In reviewing his diary his fasting blood sugars range from 82 to 178 with a median of .  Postprandial blood sugars range from 113-198 with a median of 150-180.          Lab Visit on 01/04/2022   Component Date Value Ref Range Status    Hemoglobin A1C 01/04/2022 8.6* 4.0 - 5.6 % Final    Estimated Avg Glucose 01/04/2022 200* 68 - 131 mg/dL Final   Office Visit on 12/01/2021   Component Date Value Ref Range Status    Color, UA 12/01/2021 Yellow   Final    pH, UA 12/01/2021 5.5   Final    WBC, UA 12/01/2021 negative   Final    Nitrite, UA 12/01/2021 negative   Final    Protein, UA 12/01/2021 negative   Final    Glucose, UA 12/01/2021 100 mg/dl   Final    Ketones, UA 12/01/2021 negative   Final    Urobilinogen, UA 12/01/2021 0.2 E.U./dL   Final    Bilirubin, UA 12/01/2021 negative   Final    Blood, UA 12/01/2021 negative   Final    Clarity, UA 12/01/2021 Clear   Final    Spec Grav UA 12/01/2021 >=1.030   Final   Lab Visit on 10/05/2021   Component Date Value Ref Range Status    Hemoglobin A1C 10/05/2021 8.0* 4.0 - 5.6 % Final    Estimated Avg Glucose 10/05/2021 183* 68 - 131 mg/dL Final    PSA, Screen 10/05/2021 1.6  0.00 - 4.00 ng/mL Final    Sodium 10/05/2021 138  136 - 145 mmol/L Final    Potassium 10/05/2021 4.7  3.5 - 5.1 mmol/L Final    Chloride 10/05/2021 102  95 - 110 mmol/L Final    CO2 10/05/2021 25  23 - 29 mmol/L Final    Glucose 10/05/2021 217* 70 - 110 mg/dL Final    BUN 10/05/2021 26* 8 - 23 mg/dL Final    Creatinine 10/05/2021 1.6* 0.5 - 1.4 mg/dL Final    Calcium 10/05/2021 9.9  8.7 - 10.5 mg/dL Final    Anion Gap  10/05/2021 11  8 - 16 mmol/L Final    eGFR if  10/05/2021 47* >60 mL/min/1.73 m^2 Final    eGFR if non African American 10/05/2021 41* >60 mL/min/1.73 m^2 Final    Microalbumin, Urine 10/05/2021 26.0  ug/mL Final    Creatinine, Urine 10/05/2021 23.0  23.0 - 375.0 mg/dL Final    Microalb/Creat Ratio 10/05/2021 113.0* 0.0 - 30.0 ug/mg Final   Patient Outreach on 08/18/2021   Component Date Value Ref Range Status    Left Eye DM Retinopathy 08/16/2021 Negative   Final    Right Eye DM Retinopathy 08/16/2021 Negative   Final       Past Medical History:   Diagnosis Date    CHF (congestive heart failure) 10/17/2019    Hyperlipemia, mixed     Hypertension     Stroke (cerebrum)      Social History     Socioeconomic History    Marital status:    Occupational History    Occupation: retired   Tobacco Use    Smoking status: Never Smoker    Smokeless tobacco: Former User     Types: Chew   Substance and Sexual Activity    Alcohol use: Not Currently    Drug use: Not Currently    Sexual activity: Not Currently     Past Surgical History:   Procedure Laterality Date    ANGIOGRAPHY  10/01/2019    APPENDECTOMY  1952    CARDIAC CATHETERIZATION  07/20/2019    CARDIAC DEFIBRILLATOR PLACEMENT  03/13/2020    COLONOSCOPY  01/01/2015    ECHOCARDIOGRAPHY  07/20/2019    HERNIA REPAIR  2002     Family History   Problem Relation Age of Onset    Lung cancer Mother        Review of patient's allergies indicates:   Allergen Reactions    Invokana [canagliflozin]     Trulicity [dulaglutide]        Current Outpatient Medications:     ACCU-CHEK SOFTCLIX LANCETS MISC, Accu-Chek Softclix Lancets, Disp: , Rfl:     apixaban (ELIQUIS) 5 mg Tab, Take 1 tablet (5 mg total) by mouth 2 (two) times daily., Disp: 180 tablet, Rfl: 3    ascorbic acid, vitamin C, 500 mg/5 mL Liqd, ascorbic acid (vitamin C) 500 mg/5 mL oral liquid  once daily., Disp: , Rfl:     candesartan (ATACAND) 8 MG tablet, Take 1 tablet (8 mg  total) by mouth once daily., Disp: 90 tablet, Rfl: 3    cholecalciferol, vitamin D3, 125 mcg (5,000 unit) Tab, vitamin d 5000 iu, Disp: , Rfl:     diltiaZEM (CARDIZEM) 60 MG tablet, Take 1 tablet (60 mg total) by mouth 2 (two) times a day., Disp: 180 tablet, Rfl: 3    docusate sodium (COLACE) 100 MG capsule, 100 mg., Disp: , Rfl:     furosemide (LASIX) 20 MG tablet, Take 1 tablet (20 mg total) by mouth 2 (two) times a day., Disp: 180 tablet, Rfl: 3    glimepiride (AMARYL) 2 MG tablet, 2 q am and 1 q pm (Patient taking differently: Take 2 mg by mouth daily with breakfast.), Disp: 270 tablet, Rfl: 3    hydrOXYzine HCL (ATARAX) 25 MG tablet, Take 1 tablet (25 mg total) by mouth 2 (two) times a day., Disp: 180 tablet, Rfl: 3    metFORMIN (GLUCOPHAGE-XR) 500 MG ER 24hr tablet, 1 q am and 2 q hs (Patient taking differently: Take 500 mg by mouth once daily.), Disp: 270 tablet, Rfl: 3    metoprolol tartrate (LOPRESSOR) 100 MG tablet, Take 1 tablet (100 mg total) by mouth 2 (two) times daily., Disp: 180 tablet, Rfl: 3    multivitamin with minerals tablet, complete senior vitamins and m, Disp: , Rfl:     pantoprazole (PROTONIX) 40 MG tablet, Take 1 tablet (40 mg total) by mouth once daily., Disp: 90 tablet, Rfl: 3    pravastatin (PRAVACHOL) 40 MG tablet, Take 1 tablet (40 mg total) by mouth once daily., Disp: 90 tablet, Rfl: 3    QUEtiapine (SEROQUEL) 50 MG tablet, Take 1 tablet (50 mg total) by mouth nightly., Disp: 90 tablet, Rfl: 3    tamsulosin (FLOMAX) 0.4 mg Cap, Take 1 capsule (0.4 mg total) by mouth every evening., Disp: 90 capsule, Rfl: 3    terazosin (HYTRIN) 2 MG capsule, Take 1 capsule (2 mg total) by mouth nightly., Disp: 90 capsule, Rfl: 3    blood sugar diagnostic (BLOOD GLUCOSE TEST) Strp, 3 strips by Misc.(Non-Drug; Combo Route) route once daily., Disp: 270 strip, Rfl: 3    blood-glucose meter (ACCU-CHEK MATHEW PLUS METER MISC), Accu-Chek Mathew Plus Meter, Disp: , Rfl:     ELIQUIS 5 mg Tab, ,  "Disp: , Rfl:     insulin (LANTUS SOLOSTAR U-100 INSULIN) glargine 100 units/mL (3mL) SubQ pen, Inject 25 Units into the skin every evening., Disp: 7.5 mL, Rfl: 11    pen needle, diabetic 32 gauge x 5/32" Ndle, Use daily with lantus injection at night for diabetes with hyperglycemia, Disp: 30 each, Rfl: 1    Review of Systems   Constitutional: Negative for activity change and appetite change.        Constitutional:        No chest pain   no edema  No orthostatic symptoms.     Cardiovascular: Negative for chest pain, palpitations and leg swelling.   Genitourinary:        Nocturia x 3   BPH symptoms of daily frequency and urgency  No dysuria          Respiratory: Negative for chest tightness and shortness of breath.    Cardiovascular: Negative for chest pain and palpitations.   Gastrointestinal: Negative for abdominal pain.   Endocrine: Negative for polydipsia.   Genitourinary: Positive for bladder incontinence and frequency. Negative for difficulty urinating, dysuria and hematuria.        Nocturia x 2   Integumentary:  Negative for rash.   Psychiatric/Behavioral: Negative for sleep disturbance.           Objective:      Vitals:    02/03/22 1119   BP: 128/80   Pulse: 72   Temp: 98.5 °F (36.9 °C)   SpO2: 96%   Weight: 85.3 kg (188 lb)   Height: 5' 3" (1.6 m)     Physical Exam  Vitals reviewed.   Constitutional:       Comments: Physical Exam  Vitals and nursing note reviewed.   Constitutional:       Appearance: Normal appearance.   Neck:      Vascular: No carotid bruit.   Cardiovascular:      Rate and Rhythm: Normal rate and regular rhythm.      Heart sounds: Normal heart sounds.      Comments: RRR AT TIME OF EXAM  Pulmonary:      Effort: Pulmonary effort is normal.      Breath sounds: Normal breath sounds.   Musculoskeletal:      Right lower leg: No edema.      Left lower leg: No edema.   Neurological:      Mental Status: He is alert.      Comments: Minimal rt sided weakness   Expressive aphasia         " "    Assessment:       1. Type 2 diabetes mellitus with stage 3b chronic kidney disease, without long-term current use of insulin    2. Major depressive disorder, recurrent episode, in full remission    3. Hemiparesis affecting right side as late effect of cerebrovascular accident         Plan:       Type 2 diabetes mellitus with stage 3b chronic kidney disease, without long-term current use of insulin  -     pen needle, diabetic 32 gauge x 5/32" Ndle; Use daily with lantus injection at night for diabetes with hyperglycemia  Dispense: 30 each; Refill: 1  -     Discontinue: insulin (LANTUS SOLOSTAR U-100 INSULIN) glargine 100 units/mL (3mL) SubQ pen; Inject 25 Units into the skin every evening.  -     POCT Glucose, Hand-Held Device  -     Hemoglobin A1C; Future; Expected date: 04/29/2022  -     insulin (LANTUS SOLOSTAR U-100 INSULIN) glargine 100 units/mL (3mL) SubQ pen; Inject 25 Units into the skin every evening.  Dispense: 7.5 mL; Refill: 11  -     Lipid Panel; Future; Expected date: 04/15/2022    Major depressive disorder, recurrent episode, in full remission    Hemiparesis affecting right side as late effect of cerebrovascular accident    Increase Lantus 25 units q.d..  Stop glimepiride.  Continue metformin 1 a day.  Decrease fasting and postprandial blood sugars to 3 days a week.  Repeat hemoglobin A1c in 2 months.    Follow up in about 2 months (around 4/3/2022).        2/3/2022 César Mejia M.D.      "

## 2022-02-03 NOTE — PATIENT INSTRUCTIONS
Increase Lantus 25 units q.d..  Stop glimepiride.  Continue metformin 1 a day.  Decrease fasting and postprandial blood sugars to 3 days a week.  Repeat lab in 2 months.

## 2022-02-08 DIAGNOSIS — E11.22 TYPE 2 DIABETES MELLITUS WITH STAGE 3B CHRONIC KIDNEY DISEASE, WITHOUT LONG-TERM CURRENT USE OF INSULIN: ICD-10-CM

## 2022-02-08 DIAGNOSIS — N18.32 TYPE 2 DIABETES MELLITUS WITH STAGE 3B CHRONIC KIDNEY DISEASE, WITHOUT LONG-TERM CURRENT USE OF INSULIN: ICD-10-CM

## 2022-02-08 RX ORDER — INSULIN GLARGINE 100 [IU]/ML
25 INJECTION, SOLUTION SUBCUTANEOUS NIGHTLY
Qty: 7.5 ML | Refills: 11 | Status: CANCELLED
Start: 2022-02-08 | End: 2023-02-08

## 2022-02-08 NOTE — TELEPHONE ENCOUNTER
----- Message from Brayden Fierro sent at 2/8/2022  2:47 PM CST -----  Type:  RX Refill Request    Who Called:  Pt's wife - Hanna  Refill or New Rx:  refill  RX Name and Strength:  insulin (LANTUS SOLOSTAR U-100 INSULIN) glargine 100 units/mL (3mL) SubQ pen    Preferred Pharmacy with phone number:    [facility] - [facility] [address]  [address] [facility] [address]  Phone: [phone] Fax: [phone]    ocal or Mail Order:  Mail order  Ordering Provider:  Roberto Burr Call Back Number:  [phone] (home)     Additional Information:  [facility] never received the RX they did however get the needles.  She would also like a call back has other questions as well.  Please advise -- Thank you

## 2022-02-09 ENCOUNTER — PATIENT MESSAGE (OUTPATIENT)
Dept: FAMILY MEDICINE | Facility: CLINIC | Age: 77
End: 2022-02-09
Payer: MEDICARE

## 2022-02-10 ENCOUNTER — TELEPHONE (OUTPATIENT)
Dept: FAMILY MEDICINE | Facility: CLINIC | Age: 77
End: 2022-02-10
Payer: MEDICARE

## 2022-02-10 RX ORDER — INSULIN GLARGINE 100 [IU]/ML
25 INJECTION, SOLUTION SUBCUTANEOUS NIGHTLY
Qty: 24 ML | Refills: 3 | Status: SHIPPED | OUTPATIENT
Start: 2022-02-10 | End: 2024-02-05

## 2022-02-10 NOTE — TELEPHONE ENCOUNTER
----- Message from Nydia Hernandez sent at 2/10/2022 10:42 AM CST -----  Contact: pt wife  Type: Needs Medical Advice    Who Called: pt  Best Call Back Number: [phone]  Inquiry/Question: calling to see about insulin pen prescription, states that they have not received anything at this time, please advise  Thank you~

## 2022-02-10 NOTE — TELEPHONE ENCOUNTER
Spoke to wife, pt needs appt for feet exam for shoes, scheduled on 2-24,pt to bring in CMN form to get shoes thru [facility].

## 2022-02-24 ENCOUNTER — OFFICE VISIT (OUTPATIENT)
Dept: FAMILY MEDICINE | Facility: CLINIC | Age: 77
End: 2022-02-24
Payer: MEDICARE

## 2022-02-24 VITALS
WEIGHT: 186 LBS | OXYGEN SATURATION: 97 % | HEART RATE: 70 BPM | TEMPERATURE: 99 F | BODY MASS INDEX: 32.96 KG/M2 | DIASTOLIC BLOOD PRESSURE: 80 MMHG | SYSTOLIC BLOOD PRESSURE: 124 MMHG | HEIGHT: 63 IN

## 2022-02-24 DIAGNOSIS — I77.810 DILATATION OF THORACIC AORTA: ICD-10-CM

## 2022-02-24 DIAGNOSIS — N18.32 TYPE 2 DIABETES MELLITUS WITH STAGE 3B CHRONIC KIDNEY DISEASE, WITHOUT LONG-TERM CURRENT USE OF INSULIN: Primary | ICD-10-CM

## 2022-02-24 DIAGNOSIS — E11.43 DIABETIC AUTONOMIC NEUROPATHY ASSOCIATED WITH TYPE 2 DIABETES MELLITUS: ICD-10-CM

## 2022-02-24 DIAGNOSIS — E11.22 TYPE 2 DIABETES MELLITUS WITH STAGE 3B CHRONIC KIDNEY DISEASE, WITHOUT LONG-TERM CURRENT USE OF INSULIN: Primary | ICD-10-CM

## 2022-02-24 PROCEDURE — 3074F PR MOST RECENT SYSTOLIC BLOOD PRESSURE < 130 MM HG: ICD-10-PCS | Mod: CPTII,S$GLB,, | Performed by: FAMILY MEDICINE

## 2022-02-24 PROCEDURE — 1159F PR MEDICATION LIST DOCUMENTED IN MEDICAL RECORD: ICD-10-PCS | Mod: CPTII,S$GLB,, | Performed by: FAMILY MEDICINE

## 2022-02-24 PROCEDURE — 3074F SYST BP LT 130 MM HG: CPT | Mod: CPTII,S$GLB,, | Performed by: FAMILY MEDICINE

## 2022-02-24 PROCEDURE — 1101F PT FALLS ASSESS-DOCD LE1/YR: CPT | Mod: CPTII,S$GLB,, | Performed by: FAMILY MEDICINE

## 2022-02-24 PROCEDURE — 99213 OFFICE O/P EST LOW 20 MIN: CPT | Mod: S$GLB,,, | Performed by: FAMILY MEDICINE

## 2022-02-24 PROCEDURE — 3052F PR MOST RECENT HEMOGLOBIN A1C LEVEL 8.0 - < 9.0%: ICD-10-PCS | Mod: CPTII,S$GLB,, | Performed by: FAMILY MEDICINE

## 2022-02-24 PROCEDURE — 1159F MED LIST DOCD IN RCRD: CPT | Mod: CPTII,S$GLB,, | Performed by: FAMILY MEDICINE

## 2022-02-24 PROCEDURE — 3288F PR FALLS RISK ASSESSMENT DOCUMENTED: ICD-10-PCS | Mod: CPTII,S$GLB,, | Performed by: FAMILY MEDICINE

## 2022-02-24 PROCEDURE — 3079F DIAST BP 80-89 MM HG: CPT | Mod: CPTII,S$GLB,, | Performed by: FAMILY MEDICINE

## 2022-02-24 PROCEDURE — 99999 PR PBB SHADOW E&M-EST. PATIENT-LVL IV: ICD-10-PCS | Mod: PBBFAC,,, | Performed by: FAMILY MEDICINE

## 2022-02-24 PROCEDURE — 99213 PR OFFICE/OUTPT VISIT, EST, LEVL III, 20-29 MIN: ICD-10-PCS | Mod: S$GLB,,, | Performed by: FAMILY MEDICINE

## 2022-02-24 PROCEDURE — 3052F HG A1C>EQUAL 8.0%<EQUAL 9.0%: CPT | Mod: CPTII,S$GLB,, | Performed by: FAMILY MEDICINE

## 2022-02-24 PROCEDURE — 99999 PR PBB SHADOW E&M-EST. PATIENT-LVL IV: CPT | Mod: PBBFAC,,, | Performed by: FAMILY MEDICINE

## 2022-02-24 PROCEDURE — 1126F PR PAIN SEVERITY QUANTIFIED, NO PAIN PRESENT: ICD-10-PCS | Mod: CPTII,S$GLB,, | Performed by: FAMILY MEDICINE

## 2022-02-24 PROCEDURE — 1101F PR PT FALLS ASSESS DOC 0-1 FALLS W/OUT INJ PAST YR: ICD-10-PCS | Mod: CPTII,S$GLB,, | Performed by: FAMILY MEDICINE

## 2022-02-24 PROCEDURE — 1126F AMNT PAIN NOTED NONE PRSNT: CPT | Mod: CPTII,S$GLB,, | Performed by: FAMILY MEDICINE

## 2022-02-24 PROCEDURE — 3079F PR MOST RECENT DIASTOLIC BLOOD PRESSURE 80-89 MM HG: ICD-10-PCS | Mod: CPTII,S$GLB,, | Performed by: FAMILY MEDICINE

## 2022-02-24 PROCEDURE — 3288F FALL RISK ASSESSMENT DOCD: CPT | Mod: CPTII,S$GLB,, | Performed by: FAMILY MEDICINE

## 2022-02-24 NOTE — PROGRESS NOTES
SUBJECTIVE:    Patient ID: Agapito Bass is a 76 y.o. male.    Chief Complaint: Follow-up and Diabetes (Pt here for diabetic foot exam for shoes,,,see CMN form)    76-year-old diabetic 2 recently placed on insulin.  He is at Lantus 25 units a day and metformin 1 a day currently.  Fasting blood sugar at home are in the 120 range.  Patient is scheduled for his routine regular follow-up next month.  He is here today because of foot pain.        Office Visit on 02/03/2022   Component Date Value Ref Range Status    POC Glucose 02/03/2022 115 (A) 70 - 110 MG/DL Final   Lab Visit on 01/04/2022   Component Date Value Ref Range Status    Hemoglobin A1C 01/04/2022 8.6 (A) 4.0 - 5.6 % Final    Estimated Avg Glucose 01/04/2022 200 (A) 68 - 131 mg/dL Final   Office Visit on 12/01/2021   Component Date Value Ref Range Status    Color, UA 12/01/2021 Yellow   Final    pH, UA 12/01/2021 5.5   Final    WBC, UA 12/01/2021 negative   Final    Nitrite, UA 12/01/2021 negative   Final    Protein, POC 12/01/2021 negative   Final    Glucose, UA 12/01/2021 100 mg/dl   Final    Ketones, UA 12/01/2021 negative   Final    Urobilinogen, UA 12/01/2021 0.2 E.U./dL   Final    Bilirubin, POC 12/01/2021 negative   Final    Blood, UA 12/01/2021 negative   Final    Clarity, UA 12/01/2021 Clear   Final    Spec Grav UA 12/01/2021 >=1.030   Final   Lab Visit on 10/05/2021   Component Date Value Ref Range Status    Hemoglobin A1C 10/05/2021 8.0 (A) 4.0 - 5.6 % Final    Estimated Avg Glucose 10/05/2021 183 (A) 68 - 131 mg/dL Final    PSA, Screen 10/05/2021 1.6  0.00 - 4.00 ng/mL Final    Sodium 10/05/2021 138  136 - 145 mmol/L Final    Potassium 10/05/2021 4.7  3.5 - 5.1 mmol/L Final    Chloride 10/05/2021 102  95 - 110 mmol/L Final    CO2 10/05/2021 25  23 - 29 mmol/L Final    Glucose 10/05/2021 217 (A) 70 - 110 mg/dL Final    BUN 10/05/2021 26 (A) 8 - 23 mg/dL Final    Creatinine 10/05/2021 1.6 (A) 0.5 - 1.4 mg/dL Final     Calcium 10/05/2021 9.9  8.7 - 10.5 mg/dL Final    Anion Gap 10/05/2021 11  8 - 16 mmol/L Final    eGFR if  10/05/2021 47 (A) >60 mL/min/1.73 m^2 Final    eGFR if non  10/05/2021 41 (A) >60 mL/min/1.73 m^2 Final    Microalbumin, Urine 10/05/2021 26.0  ug/mL Final    Creatinine, Urine 10/05/2021 23.0  23.0 - 375.0 mg/dL Final    Microalb/Creat Ratio 10/05/2021 113.0 (A) 0.0 - 30.0 ug/mg Final       Past Medical History:   Diagnosis Date    CHF (congestive heart failure) 10/17/2019    Hyperlipemia, mixed     Hypertension     Stroke (cerebrum)      Social History     Socioeconomic History    Marital status:    Occupational History    Occupation: retired   Tobacco Use    Smoking status: Never Smoker    Smokeless tobacco: Former User     Types: Chew   Substance and Sexual Activity    Alcohol use: Not Currently    Drug use: Not Currently    Sexual activity: Not Currently     Past Surgical History:   Procedure Laterality Date    ANGIOGRAPHY  10/01/2019    APPENDECTOMY  1952    CARDIAC CATHETERIZATION  07/20/2019    CARDIAC DEFIBRILLATOR PLACEMENT  03/13/2020    COLONOSCOPY  01/01/2015    ECHOCARDIOGRAPHY  07/20/2019    HERNIA REPAIR  2002     Family History   Problem Relation Age of Onset    Lung cancer Mother        Review of patient's allergies indicates:   Allergen Reactions    Invokana [canagliflozin]     Trulicity [dulaglutide]        Current Outpatient Medications:     ACCU-CHEK SOFTCLIX LANCETS MISC, Accu-Chek Softclix Lancets, Disp: , Rfl:     apixaban (ELIQUIS) 5 mg Tab, Take 1 tablet (5 mg total) by mouth 2 (two) times daily., Disp: 180 tablet, Rfl: 3    ascorbic acid, vitamin C, 500 mg/5 mL Liqd, ascorbic acid (vitamin C) 500 mg/5 mL oral liquid  once daily., Disp: , Rfl:     blood sugar diagnostic (BLOOD GLUCOSE TEST) Strp, 3 strips by Misc.(Non-Drug; Combo Route) route once daily., Disp: 270 strip, Rfl: 3    blood-glucose meter (ACCU-CHEK  "MATHEW PLUS METER MISC), Accu-Chek Mathew Plus Meter, Disp: , Rfl:     candesartan (ATACAND) 8 MG tablet, Take 1 tablet (8 mg total) by mouth once daily., Disp: 90 tablet, Rfl: 3    cholecalciferol, vitamin D3, 125 mcg (5,000 unit) Tab, vitamin d 5000 iu, Disp: , Rfl:     diltiaZEM (CARDIZEM) 60 MG tablet, Take 1 tablet (60 mg total) by mouth 2 (two) times a day., Disp: 180 tablet, Rfl: 3    docusate sodium (COLACE) 100 MG capsule, 100 mg., Disp: , Rfl:     ELIQUIS 5 mg Tab, , Disp: , Rfl:     furosemide (LASIX) 20 MG tablet, Take 1 tablet (20 mg total) by mouth 2 (two) times a day., Disp: 180 tablet, Rfl: 3    glimepiride (AMARYL) 2 MG tablet, 2 q am and 1 q pm (Patient taking differently: Take 2 mg by mouth daily with breakfast.), Disp: 270 tablet, Rfl: 3    hydrOXYzine HCL (ATARAX) 25 MG tablet, Take 1 tablet (25 mg total) by mouth 2 (two) times a day., Disp: 180 tablet, Rfl: 3    insulin (LANTUS SOLOSTAR U-100 INSULIN) glargine 100 units/mL (3mL) SubQ pen, Inject 25 Units into the skin every evening., Disp: 24 mL, Rfl: 3    metFORMIN (GLUCOPHAGE-XR) 500 MG ER 24hr tablet, 1 q am and 2 q hs (Patient taking differently: Take 500 mg by mouth once daily.), Disp: 270 tablet, Rfl: 3    metoprolol tartrate (LOPRESSOR) 100 MG tablet, Take 1 tablet (100 mg total) by mouth 2 (two) times daily., Disp: 180 tablet, Rfl: 3    multivitamin with minerals tablet, complete senior vitamins and m, Disp: , Rfl:     pantoprazole (PROTONIX) 40 MG tablet, Take 1 tablet (40 mg total) by mouth once daily., Disp: 90 tablet, Rfl: 3    pen needle, diabetic 32 gauge x 5/32" Ndle, Use daily with lantus injection at night for diabetes with hyperglycemia, Disp: 30 each, Rfl: 1    pravastatin (PRAVACHOL) 40 MG tablet, Take 1 tablet (40 mg total) by mouth once daily., Disp: 90 tablet, Rfl: 3    QUEtiapine (SEROQUEL) 50 MG tablet, Take 1 tablet (50 mg total) by mouth nightly., Disp: 90 tablet, Rfl: 3    tamsulosin (FLOMAX) 0.4 mg " "Cap, Take 1 capsule (0.4 mg total) by mouth every evening., Disp: 90 capsule, Rfl: 3    terazosin (HYTRIN) 2 MG capsule, Take 1 capsule (2 mg total) by mouth nightly., Disp: 90 capsule, Rfl: 3    Review of Systems   Neurological:        Decreased sensation in feet bilaterally.  Some pain across the metatarsal heads with some burning sensation.             Objective:      Vitals:    02/24/22 1532   BP: 124/80   Pulse: 70   Temp: 98.5 °F (36.9 °C)   SpO2: 97%   Weight: 84.4 kg (186 lb)   Height: 5' 3" (1.6 m)     Physical Exam  Musculoskeletal:      Comments: Bilateral early callus formations on soles medial aspect.  Decreased sensation bilaterally.     Neurological:      Sensory: Sensory deficit (Decreased sensation in the feet) present.           Assessment:       1. Type 2 diabetes mellitus with stage 3b chronic kidney disease, without long-term current use of insulin    2. Dilatation of thoracic aorta    3. Diabetic autonomic neuropathy associated with type 2 diabetes mellitus         Plan:       Type 2 diabetes mellitus with stage 3b chronic kidney disease, without long-term current use of insulin    Dilatation of thoracic aorta    Diabetic autonomic neuropathy associated with type 2 diabetes mellitus    Continue foot care.  Obtain lab work prior to next office visit.  Recommend diabetic shoes..  Follow up Keep regularly scheduled appointment.        2/24/2022 César Mejia M.D.      "

## 2022-02-25 ENCOUNTER — TELEPHONE (OUTPATIENT)
Dept: FAMILY MEDICINE | Facility: CLINIC | Age: 77
End: 2022-02-25
Payer: MEDICARE

## 2022-02-25 NOTE — TELEPHONE ENCOUNTER
----- Message from Natty Griffin MA sent at 2/25/2022  9:01 AM CST -----  Type: Needs Medical Advice    Who Called: tayler caba   Best Call Back Number: [phone]  Inquiry/Question: Please call ms caba regarding a diabetic shoe for ISAAC SCHWAB [[id number]]  Thank you~

## 2022-03-03 ENCOUNTER — TELEPHONE (OUTPATIENT)
Dept: FAMILY MEDICINE | Facility: CLINIC | Age: 77
End: 2022-03-03
Payer: MEDICARE

## 2022-03-03 NOTE — TELEPHONE ENCOUNTER
Received PA request for recent tamsulosin prescription. Submitted PA via covermymeds. Pending review by health plan currently.

## 2022-03-29 ENCOUNTER — PATIENT MESSAGE (OUTPATIENT)
Dept: FAMILY MEDICINE | Facility: CLINIC | Age: 77
End: 2022-03-29
Payer: MEDICARE

## 2022-03-31 DIAGNOSIS — D64.9 ANEMIA, UNSPECIFIED TYPE: Primary | ICD-10-CM

## 2022-04-01 ENCOUNTER — LAB VISIT (OUTPATIENT)
Dept: LAB | Facility: CLINIC | Age: 77
End: 2022-04-01
Payer: MEDICARE

## 2022-04-01 DIAGNOSIS — D64.9 ANEMIA, UNSPECIFIED TYPE: ICD-10-CM

## 2022-04-01 DIAGNOSIS — E11.22 TYPE 2 DIABETES MELLITUS WITH STAGE 3B CHRONIC KIDNEY DISEASE, WITHOUT LONG-TERM CURRENT USE OF INSULIN: ICD-10-CM

## 2022-04-01 DIAGNOSIS — N18.32 TYPE 2 DIABETES MELLITUS WITH STAGE 3B CHRONIC KIDNEY DISEASE, WITHOUT LONG-TERM CURRENT USE OF INSULIN: ICD-10-CM

## 2022-04-01 LAB
BASOPHILS # BLD AUTO: 0.03 K/UL (ref 0–0.2)
BASOPHILS NFR BLD: 0.4 % (ref 0–1.9)
CHOLEST SERPL-MCNC: 103 MG/DL (ref 120–199)
CHOLEST/HDLC SERPL: 3.8 {RATIO} (ref 2–5)
DIFFERENTIAL METHOD: ABNORMAL
EOSINOPHIL # BLD AUTO: 0.3 K/UL (ref 0–0.5)
EOSINOPHIL NFR BLD: 3.3 % (ref 0–8)
ERYTHROCYTE [DISTWIDTH] IN BLOOD BY AUTOMATED COUNT: 13.7 % (ref 11.5–14.5)
ESTIMATED AVG GLUCOSE: 180 MG/DL (ref 68–131)
HBA1C MFR BLD: 7.9 % (ref 4–5.6)
HCT VFR BLD AUTO: 36.3 % (ref 40–54)
HDLC SERPL-MCNC: 27 MG/DL (ref 40–75)
HDLC SERPL: 26.2 % (ref 20–50)
HGB BLD-MCNC: 11.3 G/DL (ref 14–18)
IMM GRANULOCYTES # BLD AUTO: 0.03 K/UL (ref 0–0.04)
IMM GRANULOCYTES NFR BLD AUTO: 0.4 % (ref 0–0.5)
LDLC SERPL CALC-MCNC: 60.6 MG/DL (ref 63–159)
LYMPHOCYTES # BLD AUTO: 1 K/UL (ref 1–4.8)
LYMPHOCYTES NFR BLD: 12.2 % (ref 18–48)
MCH RBC QN AUTO: 28 PG (ref 27–31)
MCHC RBC AUTO-ENTMCNC: 31.1 G/DL (ref 32–36)
MCV RBC AUTO: 90 FL (ref 82–98)
MONOCYTES # BLD AUTO: 0.6 K/UL (ref 0.3–1)
MONOCYTES NFR BLD: 7.8 % (ref 4–15)
NEUTROPHILS # BLD AUTO: 6.2 K/UL (ref 1.8–7.7)
NEUTROPHILS NFR BLD: 75.9 % (ref 38–73)
NONHDLC SERPL-MCNC: 76 MG/DL
NRBC BLD-RTO: 0 /100 WBC
PLATELET # BLD AUTO: 169 K/UL (ref 150–450)
PMV BLD AUTO: 10.5 FL (ref 9.2–12.9)
RBC # BLD AUTO: 4.03 M/UL (ref 4.6–6.2)
TRIGL SERPL-MCNC: 77 MG/DL (ref 30–150)
WBC # BLD AUTO: 8.1 K/UL (ref 3.9–12.7)

## 2022-04-01 PROCEDURE — 83036 HEMOGLOBIN GLYCOSYLATED A1C: CPT | Performed by: FAMILY MEDICINE

## 2022-04-01 PROCEDURE — 36415 COLL VENOUS BLD VENIPUNCTURE: CPT | Mod: PN,,, | Performed by: FAMILY MEDICINE

## 2022-04-01 PROCEDURE — 80061 LIPID PANEL: CPT | Performed by: FAMILY MEDICINE

## 2022-04-01 PROCEDURE — 85025 COMPLETE CBC W/AUTO DIFF WBC: CPT | Performed by: FAMILY MEDICINE

## 2022-04-01 PROCEDURE — 84466 ASSAY OF TRANSFERRIN: CPT | Performed by: FAMILY MEDICINE

## 2022-04-01 PROCEDURE — 36415 PR COLLECTION VENOUS BLOOD,VENIPUNCTURE: ICD-10-PCS | Mod: PN,,, | Performed by: FAMILY MEDICINE

## 2022-04-02 LAB
IRON SERPL-MCNC: 52 UG/DL (ref 45–160)
SATURATED IRON: 17 % (ref 20–50)
TOTAL IRON BINDING CAPACITY: 312 UG/DL (ref 250–450)
TRANSFERRIN SERPL-MCNC: 211 MG/DL (ref 200–375)

## 2022-04-05 ENCOUNTER — OFFICE VISIT (OUTPATIENT)
Dept: FAMILY MEDICINE | Facility: CLINIC | Age: 77
End: 2022-04-05
Payer: MEDICARE

## 2022-04-05 VITALS
HEART RATE: 65 BPM | OXYGEN SATURATION: 96 % | WEIGHT: 186 LBS | HEIGHT: 63 IN | BODY MASS INDEX: 32.96 KG/M2 | DIASTOLIC BLOOD PRESSURE: 82 MMHG | TEMPERATURE: 99 F | SYSTOLIC BLOOD PRESSURE: 120 MMHG

## 2022-04-05 DIAGNOSIS — G47.30 SLEEP APNEA, UNSPECIFIED TYPE: ICD-10-CM

## 2022-04-05 DIAGNOSIS — N18.32 TYPE 2 DIABETES MELLITUS WITH STAGE 3B CHRONIC KIDNEY DISEASE, WITHOUT LONG-TERM CURRENT USE OF INSULIN: Primary | ICD-10-CM

## 2022-04-05 DIAGNOSIS — E11.22 TYPE 2 DIABETES MELLITUS WITH STAGE 3B CHRONIC KIDNEY DISEASE, WITHOUT LONG-TERM CURRENT USE OF INSULIN: Primary | ICD-10-CM

## 2022-04-05 DIAGNOSIS — R05.9 COUGH: ICD-10-CM

## 2022-04-05 LAB — GLUCOSE SERPL-MCNC: 238 MG/DL (ref 70–110)

## 2022-04-05 PROCEDURE — 1126F PR PAIN SEVERITY QUANTIFIED, NO PAIN PRESENT: ICD-10-PCS | Mod: CPTII,S$GLB,, | Performed by: FAMILY MEDICINE

## 2022-04-05 PROCEDURE — 99999 PR PBB SHADOW E&M-EST. PATIENT-LVL V: CPT | Mod: PBBFAC,,, | Performed by: FAMILY MEDICINE

## 2022-04-05 PROCEDURE — 3288F FALL RISK ASSESSMENT DOCD: CPT | Mod: CPTII,S$GLB,, | Performed by: FAMILY MEDICINE

## 2022-04-05 PROCEDURE — 1159F PR MEDICATION LIST DOCUMENTED IN MEDICAL RECORD: ICD-10-PCS | Mod: CPTII,S$GLB,, | Performed by: FAMILY MEDICINE

## 2022-04-05 PROCEDURE — 1159F MED LIST DOCD IN RCRD: CPT | Mod: CPTII,S$GLB,, | Performed by: FAMILY MEDICINE

## 2022-04-05 PROCEDURE — 99214 OFFICE O/P EST MOD 30 MIN: CPT | Mod: S$GLB,,, | Performed by: FAMILY MEDICINE

## 2022-04-05 PROCEDURE — 3079F DIAST BP 80-89 MM HG: CPT | Mod: CPTII,S$GLB,, | Performed by: FAMILY MEDICINE

## 2022-04-05 PROCEDURE — 3079F PR MOST RECENT DIASTOLIC BLOOD PRESSURE 80-89 MM HG: ICD-10-PCS | Mod: CPTII,S$GLB,, | Performed by: FAMILY MEDICINE

## 2022-04-05 PROCEDURE — 3288F PR FALLS RISK ASSESSMENT DOCUMENTED: ICD-10-PCS | Mod: CPTII,S$GLB,, | Performed by: FAMILY MEDICINE

## 2022-04-05 PROCEDURE — 3051F PR MOST RECENT HEMOGLOBIN A1C LEVEL 7.0 - < 8.0%: ICD-10-PCS | Mod: CPTII,S$GLB,, | Performed by: FAMILY MEDICINE

## 2022-04-05 PROCEDURE — 99999 PR PBB SHADOW E&M-EST. PATIENT-LVL V: ICD-10-PCS | Mod: PBBFAC,,, | Performed by: FAMILY MEDICINE

## 2022-04-05 PROCEDURE — 82962 POCT GLUCOSE, HAND-HELD DEVICE: ICD-10-PCS | Mod: S$GLB,,, | Performed by: FAMILY MEDICINE

## 2022-04-05 PROCEDURE — 82962 GLUCOSE BLOOD TEST: CPT | Mod: S$GLB,,, | Performed by: FAMILY MEDICINE

## 2022-04-05 PROCEDURE — 3074F SYST BP LT 130 MM HG: CPT | Mod: CPTII,S$GLB,, | Performed by: FAMILY MEDICINE

## 2022-04-05 PROCEDURE — 1101F PT FALLS ASSESS-DOCD LE1/YR: CPT | Mod: CPTII,S$GLB,, | Performed by: FAMILY MEDICINE

## 2022-04-05 PROCEDURE — 99214 PR OFFICE/OUTPT VISIT, EST, LEVL IV, 30-39 MIN: ICD-10-PCS | Mod: S$GLB,,, | Performed by: FAMILY MEDICINE

## 2022-04-05 PROCEDURE — 1101F PR PT FALLS ASSESS DOC 0-1 FALLS W/OUT INJ PAST YR: ICD-10-PCS | Mod: CPTII,S$GLB,, | Performed by: FAMILY MEDICINE

## 2022-04-05 PROCEDURE — 3074F PR MOST RECENT SYSTOLIC BLOOD PRESSURE < 130 MM HG: ICD-10-PCS | Mod: CPTII,S$GLB,, | Performed by: FAMILY MEDICINE

## 2022-04-05 PROCEDURE — 1126F AMNT PAIN NOTED NONE PRSNT: CPT | Mod: CPTII,S$GLB,, | Performed by: FAMILY MEDICINE

## 2022-04-05 PROCEDURE — 3051F HG A1C>EQUAL 7.0%<8.0%: CPT | Mod: CPTII,S$GLB,, | Performed by: FAMILY MEDICINE

## 2022-04-05 RX ORDER — GUAIFENESIN AND DEXTROMETHORPHAN HYDROBROMIDE 600; 30 MG/1; MG/1
1 TABLET, EXTENDED RELEASE ORAL 2 TIMES DAILY
Qty: 20 TABLET | Refills: 0 | Status: SHIPPED | OUTPATIENT
Start: 2022-04-05 | End: 2022-04-15

## 2022-04-05 NOTE — PROGRESS NOTES
"  SUBJECTIVE:    Patient ID: Agapito Bass is a 76 y.o. male.    Chief Complaint: Follow-up and Diabetes (See labs/Pt states blood sugars fasting 96 to 119/Pt due foot exam has new diabetic shoes)    Hemoglobin A1C 4.0 - 5.6 %    Now 7.9 High       3 months 8.6 High     Diabetic follow-up on this 76-year-old male.  He is status post CVA.  He does have some expressive aphasia.  He is diabetic with stone diabetic nephropathy.  His hemoglobin A1c had an increased 8.6.  We have converted him to Lantus insulin at 25 units per day.  His hemoglobin A1c is come down to 7.9%.  His home diary reveals morning blood sugars from  and evening sugars from 0117 to 147.  Blood pressure is well controlled.    He does complain of a recent onset of a mild nonproductive cough without fevers.    Patient is not using CPAP for 3 months    He has a [employer] C Pap machine which has been recalled.  The company from which he got the CPAP machine has not senta new machine  "They are on their list."          Office Visit on 04/05/2022   Component Date Value Ref Range Status    POC Glucose 04/05/2022 238 (A) 70 - 110 MG/DL Final   Lab Visit on 04/01/2022   Component Date Value Ref Range Status    Hemoglobin A1C 04/01/2022 7.9 (A) 4.0 - 5.6 % Final    Estimated Avg Glucose 04/01/2022 180 (A) 68 - 131 mg/dL Final    Cholesterol 04/01/2022 103 (A) 120 - 199 mg/dL Final    Triglycerides 04/01/2022 77  30 - 150 mg/dL Final    HDL 04/01/2022 27 (A) 40 - 75 mg/dL Final    LDL Cholesterol 04/01/2022 60.6 (A) 63.0 - 159.0 mg/dL Final    HDL/Cholesterol Ratio 04/01/2022 26.2  20.0 - 50.0 % Final    Total Cholesterol/HDL Ratio 04/01/2022 3.8  2.0 - 5.0 Final    Non-HDL Cholesterol 04/01/2022 76  mg/dL Final    WBC 04/01/2022 8.10  3.90 - 12.70 K/uL Final    RBC 04/01/2022 4.03 (A) 4.60 - 6.20 M/uL Final    Hemoglobin 04/01/2022 11.3 (A) 14.0 - 18.0 g/dL Final    Hematocrit 04/01/2022 36.3 (A) 40.0 - 54.0 % Final    MCV 04/01/2022 90  82 " - 98 fL Final    MCH 04/01/2022 28.0  27.0 - 31.0 pg Final    MCHC 04/01/2022 31.1 (A) 32.0 - 36.0 g/dL Final    RDW 04/01/2022 13.7  11.5 - 14.5 % Final    Platelets 04/01/2022 169  150 - 450 K/uL Final    MPV 04/01/2022 10.5  9.2 - 12.9 fL Final    Immature Granulocytes 04/01/2022 0.4  0.0 - 0.5 % Final    Gran # (ANC) 04/01/2022 6.2  1.8 - 7.7 K/uL Final    Immature Grans (Abs) 04/01/2022 0.03  0.00 - 0.04 K/uL Final    Lymph # 04/01/2022 1.0  1.0 - 4.8 K/uL Final    Mono # 04/01/2022 0.6  0.3 - 1.0 K/uL Final    Eos # 04/01/2022 0.3  0.0 - 0.5 K/uL Final    Baso # 04/01/2022 0.03  0.00 - 0.20 K/uL Final    nRBC 04/01/2022 0  0 /100 WBC Final    Gran % 04/01/2022 75.9 (A) 38.0 - 73.0 % Final    Lymph % 04/01/2022 12.2 (A) 18.0 - 48.0 % Final    Mono % 04/01/2022 7.8  4.0 - 15.0 % Final    Eosinophil % 04/01/2022 3.3  0.0 - 8.0 % Final    Basophil % 04/01/2022 0.4  0.0 - 1.9 % Final    Differential Method 04/01/2022 Automated   Final    Iron 04/01/2022 52  45 - 160 ug/dL Final    Transferrin 04/01/2022 211  200 - 375 mg/dL Final    TIBC 04/01/2022 312  250 - 450 ug/dL Final    Saturated Iron 04/01/2022 17 (A) 20 - 50 % Final   Office Visit on 02/03/2022   Component Date Value Ref Range Status    POC Glucose 02/03/2022 115 (A) 70 - 110 MG/DL Final   Lab Visit on 01/04/2022   Component Date Value Ref Range Status    Hemoglobin A1C 01/04/2022 8.6 (A) 4.0 - 5.6 % Final    Estimated Avg Glucose 01/04/2022 200 (A) 68 - 131 mg/dL Final   Office Visit on 12/01/2021   Component Date Value Ref Range Status    Color, UA 12/01/2021 Yellow   Final    pH, UA 12/01/2021 5.5   Final    WBC, UA 12/01/2021 negative   Final    Nitrite, UA 12/01/2021 negative   Final    Protein, POC 12/01/2021 negative   Final    Glucose, UA 12/01/2021 100 mg/dl   Final    Ketones, UA 12/01/2021 negative   Final    Urobilinogen, UA 12/01/2021 0.2 E.U./dL   Final    Bilirubin, POC 12/01/2021 negative   Final     Blood, UA 12/01/2021 negative   Final    Clarity, UA 12/01/2021 Clear   Final    Spec Grav UA 12/01/2021 >=1.030   Final       Past Medical History:   Diagnosis Date    CHF (congestive heart failure) 10/17/2019    Hyperlipemia, mixed     Hypertension     Stroke (cerebrum)      Social History     Socioeconomic History    Marital status:    Occupational History    Occupation: retired   Tobacco Use    Smoking status: Never Smoker    Smokeless tobacco: Former User     Types: Chew   Substance and Sexual Activity    Alcohol use: Not Currently    Drug use: Not Currently    Sexual activity: Not Currently     Past Surgical History:   Procedure Laterality Date    ANGIOGRAPHY  10/01/2019    APPENDECTOMY  1952    CARDIAC CATHETERIZATION  07/20/2019    CARDIAC DEFIBRILLATOR PLACEMENT  03/13/2020    COLONOSCOPY  01/01/2015    ECHOCARDIOGRAPHY  07/20/2019    HERNIA REPAIR  2002     Family History   Problem Relation Age of Onset    Lung cancer Mother        Review of patient's allergies indicates:   Allergen Reactions    Invokana [canagliflozin]     Trulicity [dulaglutide]        Current Outpatient Medications:     apixaban (ELIQUIS) 5 mg Tab, Take 1 tablet (5 mg total) by mouth 2 (two) times daily., Disp: 180 tablet, Rfl: 3    ascorbic acid, vitamin C, 500 mg/5 mL Liqd, ascorbic acid (vitamin C) 500 mg/5 mL oral liquid  once daily., Disp: , Rfl:     blood sugar diagnostic (BLOOD GLUCOSE TEST) Strp, 3 strips by Misc.(Non-Drug; Combo Route) route once daily., Disp: 270 strip, Rfl: 3    blood-glucose meter (ACCU-CHEK MATHEW PLUS METER MISC), Accu-Chek Mathew Plus Meter, Disp: , Rfl:     candesartan (ATACAND) 8 MG tablet, Take 1 tablet (8 mg total) by mouth once daily., Disp: 90 tablet, Rfl: 3    cholecalciferol, vitamin D3, 125 mcg (5,000 unit) Tab, vitamin d 5000 iu, Disp: , Rfl:     diltiaZEM (CARDIZEM) 60 MG tablet, Take 1 tablet (60 mg total) by mouth 2 (two) times a day., Disp: 180 tablet, Rfl:  "3    docusate sodium (COLACE) 100 MG capsule, 100 mg., Disp: , Rfl:     ELIQUIS 5 mg Tab, , Disp: , Rfl:     furosemide (LASIX) 20 MG tablet, Take 1 tablet (20 mg total) by mouth 2 (two) times a day., Disp: 180 tablet, Rfl: 3    hydrOXYzine HCL (ATARAX) 25 MG tablet, Take 1 tablet (25 mg total) by mouth 2 (two) times a day., Disp: 180 tablet, Rfl: 3    insulin (LANTUS SOLOSTAR U-100 INSULIN) glargine 100 units/mL (3mL) SubQ pen, Inject 25 Units into the skin every evening., Disp: 24 mL, Rfl: 3    metoprolol tartrate (LOPRESSOR) 100 MG tablet, Take 1 tablet (100 mg total) by mouth 2 (two) times daily., Disp: 180 tablet, Rfl: 3    multivitamin with minerals tablet, complete senior vitamins and m, Disp: , Rfl:     pantoprazole (PROTONIX) 40 MG tablet, Take 1 tablet (40 mg total) by mouth once daily., Disp: 90 tablet, Rfl: 3    pen needle, diabetic 32 gauge x 5/32" Ndle, Use daily with lantus injection at night for diabetes with hyperglycemia, Disp: 30 each, Rfl: 1    pravastatin (PRAVACHOL) 40 MG tablet, Take 1 tablet (40 mg total) by mouth once daily., Disp: 90 tablet, Rfl: 3    QUEtiapine (SEROQUEL) 50 MG tablet, Take 1 tablet (50 mg total) by mouth nightly., Disp: 90 tablet, Rfl: 3    tamsulosin (FLOMAX) 0.4 mg Cap, Take 1 capsule (0.4 mg total) by mouth every evening., Disp: 90 capsule, Rfl: 3    terazosin (HYTRIN) 2 MG capsule, Take 1 capsule (2 mg total) by mouth nightly., Disp: 90 capsule, Rfl: 3    ACCU-CHEK SOFTCLIX LANCETS MISC, Accu-Chek Softclix Lancets, Disp: , Rfl:     dextromethorphan-guaiFENesin  mg (MUCINEX DM)  mg per 12 hr tablet, Take 1 tablet by mouth 2 (two) times daily. for 10 days, Disp: 20 tablet, Rfl: 0    glimepiride (AMARYL) 2 MG tablet, 2 q am and 1 q pm (Patient not taking: Reported on 4/5/2022), Disp: 270 tablet, Rfl: 3    metFORMIN (GLUCOPHAGE-XR) 500 MG ER 24hr tablet, 1 q am and 2 q hs (Patient taking differently: Take 500 mg by mouth once daily. Takes I " "qd), Disp: 270 tablet, Rfl: 3    Review of Systems   Constitutional:        Per HPI           Objective:      Vitals:    04/05/22 1351   BP: 120/82   Pulse: 65   Temp: 98.5 °F (36.9 °C)   SpO2: 96%   Weight: 84.4 kg (186 lb)   Height: 5' 3" (1.6 m)     Physical Exam  Constitutional:       Comments:   Blood pressure 120/80 pulse 65 regular  Heart is regular rate and rhythm without significant murmur.  Lungs are clear in all fields.  No peripheral edema.  Foot exam shows vascular intact and neurologically intact  No calluses at this time open           Assessment:       1. Type 2 diabetes mellitus with stage 3b chronic kidney disease, without long-term current use of insulin    2. Sleep apnea, unspecified type    3. Cough         Plan:       Type 2 diabetes mellitus with stage 3b chronic kidney disease, without long-term current use of insulin  -     POCT Glucose, Hand-Held Device    Sleep apnea, unspecified type  -     CPAP FOR HOME USE    Cough  -     dextromethorphan-guaiFENesin  mg (MUCINEX DM)  mg per 12 hr tablet; Take 1 tablet by mouth 2 (two) times daily. for 10 days  Dispense: 20 tablet; Refill: 0    Increase Lantus 30 units q.a.m..  Continue metformin 1 a day  Follow up in about 3 months (around 7/5/2022).        4/5/2022 César Mejia M.D.      "

## 2022-05-24 ENCOUNTER — PATIENT MESSAGE (OUTPATIENT)
Dept: FAMILY MEDICINE | Facility: CLINIC | Age: 77
End: 2022-05-24
Payer: MEDICARE

## 2022-05-24 DIAGNOSIS — E11.22 TYPE 2 DIABETES MELLITUS WITH STAGE 3B CHRONIC KIDNEY DISEASE, WITHOUT LONG-TERM CURRENT USE OF INSULIN: ICD-10-CM

## 2022-05-24 DIAGNOSIS — N18.32 TYPE 2 DIABETES MELLITUS WITH STAGE 3B CHRONIC KIDNEY DISEASE, WITHOUT LONG-TERM CURRENT USE OF INSULIN: ICD-10-CM

## 2022-05-24 RX ORDER — PEN NEEDLE, DIABETIC 30 GX3/16"
NEEDLE, DISPOSABLE MISCELLANEOUS
Qty: 90 EACH | Refills: 3 | Status: SHIPPED | OUTPATIENT
Start: 2022-05-24

## 2022-07-07 ENCOUNTER — LAB VISIT (OUTPATIENT)
Dept: LAB | Facility: CLINIC | Age: 77
End: 2022-07-07
Payer: MEDICARE

## 2022-07-07 ENCOUNTER — OFFICE VISIT (OUTPATIENT)
Dept: FAMILY MEDICINE | Facility: CLINIC | Age: 77
End: 2022-07-07
Payer: MEDICARE

## 2022-07-07 VITALS
WEIGHT: 185 LBS | SYSTOLIC BLOOD PRESSURE: 110 MMHG | OXYGEN SATURATION: 95 % | DIASTOLIC BLOOD PRESSURE: 76 MMHG | TEMPERATURE: 98 F | BODY MASS INDEX: 32.77 KG/M2 | HEART RATE: 90 BPM

## 2022-07-07 DIAGNOSIS — E11.21 DIABETIC GLOMERULOPATHY: Primary | ICD-10-CM

## 2022-07-07 DIAGNOSIS — E11.21 DIABETIC GLOMERULOPATHY: ICD-10-CM

## 2022-07-07 DIAGNOSIS — E61.1 IRON DEFICIENCY: ICD-10-CM

## 2022-07-07 DIAGNOSIS — J30.2 SEASONAL ALLERGIC RHINITIS, UNSPECIFIED TRIGGER: ICD-10-CM

## 2022-07-07 LAB
ESTIMATED AVG GLUCOSE: 171 MG/DL (ref 68–131)
HBA1C MFR BLD: 7.6 % (ref 4–5.6)

## 2022-07-07 PROCEDURE — 99214 OFFICE O/P EST MOD 30 MIN: CPT | Mod: S$GLB,,, | Performed by: FAMILY MEDICINE

## 2022-07-07 PROCEDURE — 99214 PR OFFICE/OUTPT VISIT, EST, LEVL IV, 30-39 MIN: ICD-10-PCS | Mod: S$GLB,,, | Performed by: FAMILY MEDICINE

## 2022-07-07 PROCEDURE — 1159F MED LIST DOCD IN RCRD: CPT | Mod: CPTII,S$GLB,, | Performed by: FAMILY MEDICINE

## 2022-07-07 PROCEDURE — 1101F PR PT FALLS ASSESS DOC 0-1 FALLS W/OUT INJ PAST YR: ICD-10-PCS | Mod: CPTII,S$GLB,, | Performed by: FAMILY MEDICINE

## 2022-07-07 PROCEDURE — 1159F PR MEDICATION LIST DOCUMENTED IN MEDICAL RECORD: ICD-10-PCS | Mod: CPTII,S$GLB,, | Performed by: FAMILY MEDICINE

## 2022-07-07 PROCEDURE — 3288F FALL RISK ASSESSMENT DOCD: CPT | Mod: CPTII,S$GLB,, | Performed by: FAMILY MEDICINE

## 2022-07-07 PROCEDURE — 3074F PR MOST RECENT SYSTOLIC BLOOD PRESSURE < 130 MM HG: ICD-10-PCS | Mod: CPTII,S$GLB,, | Performed by: FAMILY MEDICINE

## 2022-07-07 PROCEDURE — 36415 COLL VENOUS BLD VENIPUNCTURE: CPT | Mod: PN,,, | Performed by: STUDENT IN AN ORGANIZED HEALTH CARE EDUCATION/TRAINING PROGRAM

## 2022-07-07 PROCEDURE — 3051F PR MOST RECENT HEMOGLOBIN A1C LEVEL 7.0 - < 8.0%: ICD-10-PCS | Mod: CPTII,S$GLB,, | Performed by: FAMILY MEDICINE

## 2022-07-07 PROCEDURE — 3074F SYST BP LT 130 MM HG: CPT | Mod: CPTII,S$GLB,, | Performed by: FAMILY MEDICINE

## 2022-07-07 PROCEDURE — 3288F PR FALLS RISK ASSESSMENT DOCUMENTED: ICD-10-PCS | Mod: CPTII,S$GLB,, | Performed by: FAMILY MEDICINE

## 2022-07-07 PROCEDURE — 99999 PR PBB SHADOW E&M-EST. PATIENT-LVL IV: CPT | Mod: PBBFAC,,, | Performed by: FAMILY MEDICINE

## 2022-07-07 PROCEDURE — 3078F DIAST BP <80 MM HG: CPT | Mod: CPTII,S$GLB,, | Performed by: FAMILY MEDICINE

## 2022-07-07 PROCEDURE — 36415 PR COLLECTION VENOUS BLOOD,VENIPUNCTURE: ICD-10-PCS | Mod: PN,,, | Performed by: STUDENT IN AN ORGANIZED HEALTH CARE EDUCATION/TRAINING PROGRAM

## 2022-07-07 PROCEDURE — 3078F PR MOST RECENT DIASTOLIC BLOOD PRESSURE < 80 MM HG: ICD-10-PCS | Mod: CPTII,S$GLB,, | Performed by: FAMILY MEDICINE

## 2022-07-07 PROCEDURE — 3051F HG A1C>EQUAL 7.0%<8.0%: CPT | Mod: CPTII,S$GLB,, | Performed by: FAMILY MEDICINE

## 2022-07-07 PROCEDURE — 99999 PR PBB SHADOW E&M-EST. PATIENT-LVL IV: ICD-10-PCS | Mod: PBBFAC,,, | Performed by: FAMILY MEDICINE

## 2022-07-07 PROCEDURE — 83036 HEMOGLOBIN GLYCOSYLATED A1C: CPT | Performed by: FAMILY MEDICINE

## 2022-07-07 PROCEDURE — 1101F PT FALLS ASSESS-DOCD LE1/YR: CPT | Mod: CPTII,S$GLB,, | Performed by: FAMILY MEDICINE

## 2022-07-07 NOTE — PROGRESS NOTES
SUBJECTIVE:    Patient ID: Agapito Bass is a 76 y.o. male.    Chief Complaint: No chief complaint on file.    Diabetic follow-up on this 76-year-old male.  He is status post CVA.  He does have some expressive aphasia.  He is diabetic with mild diabetic nephropathy.  His hemoglobin A1c had an increased 8.6.  We have converted him to Lantus insulin at 25 units per day.  His hemoglobin A1c is come down to 7.9%.  His home diary reveals morning blood sugars from  and a median around 122.  Blood pressure is well controlled.    Has been on Lantus 30 units a day for three months and metformin 500 1 q day .  He is off glimepiride.    He did receive a new CPAP machine and probably is using it without difficulty.    He does complain sneezing and watery eyes intermittently.  He does take Claritin for this periodically.  It is moderately effective        Office Visit on 04/05/2022   Component Date Value Ref Range Status    POC Glucose 04/05/2022 238 (A) 70 - 110 MG/DL Final   Lab Visit on 04/01/2022   Component Date Value Ref Range Status    Hemoglobin A1C 04/01/2022 7.9 (A) 4.0 - 5.6 % Final    Estimated Avg Glucose 04/01/2022 180 (A) 68 - 131 mg/dL Final    Cholesterol 04/01/2022 103 (A) 120 - 199 mg/dL Final    Triglycerides 04/01/2022 77  30 - 150 mg/dL Final    HDL 04/01/2022 27 (A) 40 - 75 mg/dL Final    LDL Cholesterol 04/01/2022 60.6 (A) 63.0 - 159.0 mg/dL Final    HDL/Cholesterol Ratio 04/01/2022 26.2  20.0 - 50.0 % Final    Total Cholesterol/HDL Ratio 04/01/2022 3.8  2.0 - 5.0 Final    Non-HDL Cholesterol 04/01/2022 76  mg/dL Final    WBC 04/01/2022 8.10  3.90 - 12.70 K/uL Final    RBC 04/01/2022 4.03 (A) 4.60 - 6.20 M/uL Final    Hemoglobin 04/01/2022 11.3 (A) 14.0 - 18.0 g/dL Final    Hematocrit 04/01/2022 36.3 (A) 40.0 - 54.0 % Final    MCV 04/01/2022 90  82 - 98 fL Final    MCH 04/01/2022 28.0  27.0 - 31.0 pg Final    MCHC 04/01/2022 31.1 (A) 32.0 - 36.0 g/dL Final    RDW 04/01/2022 13.7   11.5 - 14.5 % Final    Platelets 04/01/2022 169  150 - 450 K/uL Final    MPV 04/01/2022 10.5  9.2 - 12.9 fL Final    Immature Granulocytes 04/01/2022 0.4  0.0 - 0.5 % Final    Gran # (ANC) 04/01/2022 6.2  1.8 - 7.7 K/uL Final    Immature Grans (Abs) 04/01/2022 0.03  0.00 - 0.04 K/uL Final    Lymph # 04/01/2022 1.0  1.0 - 4.8 K/uL Final    Mono # 04/01/2022 0.6  0.3 - 1.0 K/uL Final    Eos # 04/01/2022 0.3  0.0 - 0.5 K/uL Final    Baso # 04/01/2022 0.03  0.00 - 0.20 K/uL Final    nRBC 04/01/2022 0  0 /100 WBC Final    Gran % 04/01/2022 75.9 (A) 38.0 - 73.0 % Final    Lymph % 04/01/2022 12.2 (A) 18.0 - 48.0 % Final    Mono % 04/01/2022 7.8  4.0 - 15.0 % Final    Eosinophil % 04/01/2022 3.3  0.0 - 8.0 % Final    Basophil % 04/01/2022 0.4  0.0 - 1.9 % Final    Differential Method 04/01/2022 Automated   Final    Iron 04/01/2022 52  45 - 160 ug/dL Final    Transferrin 04/01/2022 211  200 - 375 mg/dL Final    TIBC 04/01/2022 312  250 - 450 ug/dL Final    Saturated Iron 04/01/2022 17 (A) 20 - 50 % Final   Office Visit on 02/03/2022   Component Date Value Ref Range Status    POC Glucose 02/03/2022 115 (A) 70 - 110 MG/DL Final       Past Medical History:   Diagnosis Date    CHF (congestive heart failure) 10/17/2019    Hyperlipemia, mixed     Hypertension     Stroke (cerebrum)      Social History     Socioeconomic History    Marital status:    Occupational History    Occupation: retired   Tobacco Use    Smoking status: Never Smoker    Smokeless tobacco: Former User     Types: Chew   Substance and Sexual Activity    Alcohol use: Not Currently    Drug use: Not Currently    Sexual activity: Not Currently     Past Surgical History:   Procedure Laterality Date    ANGIOGRAPHY  10/01/2019    APPENDECTOMY  1952    CARDIAC CATHETERIZATION  07/20/2019    CARDIAC DEFIBRILLATOR PLACEMENT  03/13/2020    COLONOSCOPY  01/01/2015    ECHOCARDIOGRAPHY  07/20/2019    HERNIA REPAIR  2002     Family  History   Problem Relation Age of Onset    Lung cancer Mother        Review of patient's allergies indicates:   Allergen Reactions    Invokana [canagliflozin]     Trulicity [dulaglutide]        Current Outpatient Medications:     ACCU-CHEK SOFTCLIX LANCETS MISC, Accu-Chek Softclix Lancets, Disp: , Rfl:     apixaban (ELIQUIS) 5 mg Tab, Take 1 tablet (5 mg total) by mouth 2 (two) times daily., Disp: 180 tablet, Rfl: 3    ascorbic acid, vitamin C, 500 mg/5 mL Liqd, ascorbic acid (vitamin C) 500 mg/5 mL oral liquid  once daily., Disp: , Rfl:     blood sugar diagnostic (BLOOD GLUCOSE TEST) Strp, 3 strips by Misc.(Non-Drug; Combo Route) route once daily., Disp: 270 strip, Rfl: 3    blood-glucose meter (ACCU-CHEK MATHEW PLUS METER MISC), Accu-Chek Mathew Plus Meter, Disp: , Rfl:     candesartan (ATACAND) 8 MG tablet, Take 1 tablet (8 mg total) by mouth once daily., Disp: 90 tablet, Rfl: 3    cholecalciferol, vitamin D3, 125 mcg (5,000 unit) Tab, vitamin d 5000 iu, Disp: , Rfl:     diltiaZEM (CARDIZEM) 60 MG tablet, Take 1 tablet (60 mg total) by mouth 2 (two) times a day., Disp: 180 tablet, Rfl: 3    docusate sodium (COLACE) 100 MG capsule, 100 mg., Disp: , Rfl:     ELIQUIS 5 mg Tab, , Disp: , Rfl:     furosemide (LASIX) 20 MG tablet, Take 1 tablet (20 mg total) by mouth 2 (two) times a day., Disp: 180 tablet, Rfl: 3    hydrOXYzine HCL (ATARAX) 25 MG tablet, Take 1 tablet (25 mg total) by mouth 2 (two) times a day., Disp: 180 tablet, Rfl: 3    insulin (LANTUS SOLOSTAR U-100 INSULIN) glargine 100 units/mL (3mL) SubQ pen, Inject 25 Units into the skin every evening., Disp: 24 mL, Rfl: 3    metFORMIN (GLUCOPHAGE-XR) 500 MG ER 24hr tablet, 1 q am and 2 q hs (Patient taking differently: Take 500 mg by mouth once daily. Takes I qd), Disp: 270 tablet, Rfl: 3    metoprolol tartrate (LOPRESSOR) 100 MG tablet, Take 1 tablet (100 mg total) by mouth 2 (two) times daily., Disp: 180 tablet, Rfl: 3    multivitamin with  "minerals tablet, complete senior vitamins and m, Disp: , Rfl:     pantoprazole (PROTONIX) 40 MG tablet, Take 1 tablet (40 mg total) by mouth once daily., Disp: 90 tablet, Rfl: 3    pen needle, diabetic 32 gauge x 5/32" Ndle, Use daily with lantus injection at night for diabetes with hyperglycemia, Disp: 90 each, Rfl: 3    pravastatin (PRAVACHOL) 40 MG tablet, Take 1 tablet (40 mg total) by mouth once daily., Disp: 90 tablet, Rfl: 3    QUEtiapine (SEROQUEL) 50 MG tablet, Take 1 tablet (50 mg total) by mouth nightly., Disp: 90 tablet, Rfl: 3    tamsulosin (FLOMAX) 0.4 mg Cap, Take 1 capsule (0.4 mg total) by mouth every evening., Disp: 90 capsule, Rfl: 3    terazosin (HYTRIN) 2 MG capsule, Take 1 capsule (2 mg total) by mouth nightly., Disp: 90 capsule, Rfl: 3    Review of Systems   Constitutional:              He denies chest pain.  He denies shortness of breath.  His nocturia has improved to twice a night.  He denies edema.  He denies GERD or other GI symptoms   All other systems reviewed and are negative.          Objective:      Vitals:    07/07/22 1028   BP: 110/76   Pulse: 90   Temp: 98 °F (36.7 °C)   TempSrc: Oral   SpO2: 95%   Weight: 83.9 kg (185 lb)     Physical Exam  Constitutional:       Comments:         Blood pressure 110/76 and  Pulse 90 regular  Heart is regular rate and rhythm without significant murmur.  No carotid bruits are appreciated  Lungs are clear in all fields.  No peripheral edema.  Foot exam shows vascular intact and neurologically intact  No calluses at this time.           Assessment:       1. Diabetic glomerulopathy    2. Iron deficiency    3. Seasonal allergic rhinitis, unspecified trigger         Plan:       Diabetic glomerulopathy  -     Hemoglobin A1C; Future; Expected date: 07/07/2022  -     Hemoglobin A1C; Future; Expected date: 10/07/2022  -     Basic metabolic panel; Future; Expected date: 10/07/2022    Iron deficiency  -     CBC Auto Differential; Future; Expected date: " 10/07/2022    Seasonal allergic rhinitis, unspecified trigger    Take Claritin regularly for allergies.  If ineffective, try Allegra I 80 a day  Get lab ordered prior to next visit.   Obtain diabetic test today  Follow up in about 3 months (around 10/7/2022).        7/7/2022 César Mejia M.D.

## 2022-07-07 NOTE — PATIENT INSTRUCTIONS
Take Claritin regularly for allergies.  If ineffective, try Allegra I 80 a day  Get lab ordered prior to next visit.   Obtain diabetic test today

## 2022-07-12 ENCOUNTER — TELEPHONE (OUTPATIENT)
Dept: FAMILY MEDICINE | Facility: CLINIC | Age: 77
End: 2022-07-12
Payer: MEDICARE

## 2022-07-12 ENCOUNTER — PATIENT MESSAGE (OUTPATIENT)
Dept: FAMILY MEDICINE | Facility: CLINIC | Age: 77
End: 2022-07-12
Payer: MEDICARE

## 2022-07-12 NOTE — PROGRESS NOTES
Hgb A1c ( Diabetes test) is improving  Needs to be a little better.  Increase Lantus to 35 units a day

## 2022-07-12 NOTE — TELEPHONE ENCOUNTER
Spoke to wife,given a1c results and they already reviewed on the portal.Increase lantus to 35 units every day.      ----- Message from César Mejia MD sent at 7/12/2022  2:51 PM CDT -----  Hgb A1c ( Diabetes test) is improving  Needs to be a little better.  Increase Lantus to 35 units a day

## 2022-07-21 ENCOUNTER — PATIENT MESSAGE (OUTPATIENT)
Dept: FAMILY MEDICINE | Facility: CLINIC | Age: 77
End: 2022-07-21
Payer: MEDICARE

## 2022-09-06 ENCOUNTER — PATIENT MESSAGE (OUTPATIENT)
Dept: FAMILY MEDICINE | Facility: CLINIC | Age: 77
End: 2022-09-06
Payer: MEDICARE

## 2022-09-20 LAB
LEFT EYE DM RETINOPATHY: NEGATIVE
RIGHT EYE DM RETINOPATHY: NEGATIVE

## 2022-09-26 ENCOUNTER — OFFICE VISIT (OUTPATIENT)
Dept: FAMILY MEDICINE | Facility: CLINIC | Age: 77
End: 2022-09-26
Payer: MEDICARE

## 2022-09-26 VITALS
TEMPERATURE: 98 F | WEIGHT: 189.81 LBS | OXYGEN SATURATION: 96 % | BODY MASS INDEX: 33.62 KG/M2 | DIASTOLIC BLOOD PRESSURE: 64 MMHG | HEART RATE: 62 BPM | SYSTOLIC BLOOD PRESSURE: 120 MMHG

## 2022-09-26 DIAGNOSIS — H66.001 NON-RECURRENT ACUTE SUPPURATIVE OTITIS MEDIA OF RIGHT EAR WITHOUT SPONTANEOUS RUPTURE OF TYMPANIC MEMBRANE: Primary | ICD-10-CM

## 2022-09-26 DIAGNOSIS — R05.9 COUGH: ICD-10-CM

## 2022-09-26 PROCEDURE — 3074F SYST BP LT 130 MM HG: CPT | Mod: CPTII,S$GLB,,

## 2022-09-26 PROCEDURE — 1125F AMNT PAIN NOTED PAIN PRSNT: CPT | Mod: CPTII,S$GLB,,

## 2022-09-26 PROCEDURE — 99999 PR PBB SHADOW E&M-EST. PATIENT-LVL V: CPT | Mod: PBBFAC,,,

## 2022-09-26 PROCEDURE — 1101F PR PT FALLS ASSESS DOC 0-1 FALLS W/OUT INJ PAST YR: ICD-10-PCS | Mod: CPTII,S$GLB,,

## 2022-09-26 PROCEDURE — 99214 OFFICE O/P EST MOD 30 MIN: CPT | Mod: S$GLB,,,

## 2022-09-26 PROCEDURE — 1159F MED LIST DOCD IN RCRD: CPT | Mod: CPTII,S$GLB,,

## 2022-09-26 PROCEDURE — 3288F PR FALLS RISK ASSESSMENT DOCUMENTED: ICD-10-PCS | Mod: CPTII,S$GLB,,

## 2022-09-26 PROCEDURE — 3288F FALL RISK ASSESSMENT DOCD: CPT | Mod: CPTII,S$GLB,,

## 2022-09-26 PROCEDURE — 1101F PT FALLS ASSESS-DOCD LE1/YR: CPT | Mod: CPTII,S$GLB,,

## 2022-09-26 PROCEDURE — 99214 PR OFFICE/OUTPT VISIT, EST, LEVL IV, 30-39 MIN: ICD-10-PCS | Mod: S$GLB,,,

## 2022-09-26 PROCEDURE — 3078F DIAST BP <80 MM HG: CPT | Mod: CPTII,S$GLB,,

## 2022-09-26 PROCEDURE — 3078F PR MOST RECENT DIASTOLIC BLOOD PRESSURE < 80 MM HG: ICD-10-PCS | Mod: CPTII,S$GLB,,

## 2022-09-26 PROCEDURE — 99999 PR PBB SHADOW E&M-EST. PATIENT-LVL V: ICD-10-PCS | Mod: PBBFAC,,,

## 2022-09-26 PROCEDURE — 1125F PR PAIN SEVERITY QUANTIFIED, PAIN PRESENT: ICD-10-PCS | Mod: CPTII,S$GLB,,

## 2022-09-26 PROCEDURE — 3074F PR MOST RECENT SYSTOLIC BLOOD PRESSURE < 130 MM HG: ICD-10-PCS | Mod: CPTII,S$GLB,,

## 2022-09-26 PROCEDURE — 1160F RVW MEDS BY RX/DR IN RCRD: CPT | Mod: CPTII,S$GLB,,

## 2022-09-26 PROCEDURE — 1160F PR REVIEW ALL MEDS BY PRESCRIBER/CLIN PHARMACIST DOCUMENTED: ICD-10-PCS | Mod: CPTII,S$GLB,,

## 2022-09-26 PROCEDURE — 1159F PR MEDICATION LIST DOCUMENTED IN MEDICAL RECORD: ICD-10-PCS | Mod: CPTII,S$GLB,,

## 2022-09-26 RX ORDER — AMOXICILLIN AND CLAVULANATE POTASSIUM 875; 125 MG/1; MG/1
1 TABLET, FILM COATED ORAL 2 TIMES DAILY
Qty: 20 TABLET | Refills: 0 | Status: SHIPPED | OUTPATIENT
Start: 2022-09-26 | End: 2022-10-06

## 2022-09-30 ENCOUNTER — LAB VISIT (OUTPATIENT)
Dept: LAB | Facility: CLINIC | Age: 77
End: 2022-09-30
Payer: MEDICARE

## 2022-09-30 DIAGNOSIS — E61.1 IRON DEFICIENCY: ICD-10-CM

## 2022-09-30 DIAGNOSIS — E11.21 DIABETIC GLOMERULOPATHY: ICD-10-CM

## 2022-09-30 LAB
ANION GAP SERPL CALC-SCNC: 12 MMOL/L (ref 8–16)
BASOPHILS # BLD AUTO: 0.03 K/UL (ref 0–0.2)
BASOPHILS NFR BLD: 0.3 % (ref 0–1.9)
BUN SERPL-MCNC: 18 MG/DL (ref 8–23)
CALCIUM SERPL-MCNC: 9.4 MG/DL (ref 8.7–10.5)
CHLORIDE SERPL-SCNC: 106 MMOL/L (ref 95–110)
CO2 SERPL-SCNC: 24 MMOL/L (ref 23–29)
CREAT SERPL-MCNC: 1.3 MG/DL (ref 0.5–1.4)
DIFFERENTIAL METHOD: ABNORMAL
EOSINOPHIL # BLD AUTO: 0.2 K/UL (ref 0–0.5)
EOSINOPHIL NFR BLD: 1.9 % (ref 0–8)
ERYTHROCYTE [DISTWIDTH] IN BLOOD BY AUTOMATED COUNT: 14.1 % (ref 11.5–14.5)
EST. GFR  (NO RACE VARIABLE): 56 ML/MIN/1.73 M^2
ESTIMATED AVG GLUCOSE: 169 MG/DL (ref 68–131)
GLUCOSE SERPL-MCNC: 88 MG/DL (ref 70–110)
HBA1C MFR BLD: 7.5 % (ref 4–5.6)
HCT VFR BLD AUTO: 35.9 % (ref 40–54)
HGB BLD-MCNC: 11.8 G/DL (ref 14–18)
IMM GRANULOCYTES # BLD AUTO: 0.02 K/UL (ref 0–0.04)
IMM GRANULOCYTES NFR BLD AUTO: 0.2 % (ref 0–0.5)
LYMPHOCYTES # BLD AUTO: 0.9 K/UL (ref 1–4.8)
LYMPHOCYTES NFR BLD: 8.8 % (ref 18–48)
MCH RBC QN AUTO: 28.9 PG (ref 27–31)
MCHC RBC AUTO-ENTMCNC: 32.9 G/DL (ref 32–36)
MCV RBC AUTO: 88 FL (ref 82–98)
MONOCYTES # BLD AUTO: 0.6 K/UL (ref 0.3–1)
MONOCYTES NFR BLD: 6.5 % (ref 4–15)
NEUTROPHILS # BLD AUTO: 8 K/UL (ref 1.8–7.7)
NEUTROPHILS NFR BLD: 82.3 % (ref 38–73)
NRBC BLD-RTO: 0 /100 WBC
PLATELET # BLD AUTO: 143 K/UL (ref 150–450)
PMV BLD AUTO: 10.8 FL (ref 9.2–12.9)
POTASSIUM SERPL-SCNC: 3.5 MMOL/L (ref 3.5–5.1)
RBC # BLD AUTO: 4.08 M/UL (ref 4.6–6.2)
SODIUM SERPL-SCNC: 142 MMOL/L (ref 136–145)
WBC # BLD AUTO: 9.69 K/UL (ref 3.9–12.7)

## 2022-09-30 PROCEDURE — 80048 BASIC METABOLIC PNL TOTAL CA: CPT | Performed by: FAMILY MEDICINE

## 2022-09-30 PROCEDURE — 36415 COLL VENOUS BLD VENIPUNCTURE: CPT | Mod: PN,,, | Performed by: STUDENT IN AN ORGANIZED HEALTH CARE EDUCATION/TRAINING PROGRAM

## 2022-09-30 PROCEDURE — 85025 COMPLETE CBC W/AUTO DIFF WBC: CPT | Performed by: FAMILY MEDICINE

## 2022-09-30 PROCEDURE — 36415 PR COLLECTION VENOUS BLOOD,VENIPUNCTURE: ICD-10-PCS | Mod: PN,,, | Performed by: STUDENT IN AN ORGANIZED HEALTH CARE EDUCATION/TRAINING PROGRAM

## 2022-09-30 PROCEDURE — 83036 HEMOGLOBIN GLYCOSYLATED A1C: CPT | Performed by: FAMILY MEDICINE

## 2022-10-06 ENCOUNTER — OFFICE VISIT (OUTPATIENT)
Dept: FAMILY MEDICINE | Facility: CLINIC | Age: 77
End: 2022-10-06
Payer: MEDICARE

## 2022-10-06 VITALS
SYSTOLIC BLOOD PRESSURE: 130 MMHG | TEMPERATURE: 98 F | WEIGHT: 189 LBS | HEART RATE: 61 BPM | BODY MASS INDEX: 33.49 KG/M2 | DIASTOLIC BLOOD PRESSURE: 80 MMHG | OXYGEN SATURATION: 96 % | HEIGHT: 63 IN

## 2022-10-06 DIAGNOSIS — R48.2 APRAXIA OF SPEECH: ICD-10-CM

## 2022-10-06 DIAGNOSIS — J30.2 SEASONAL ALLERGIC RHINITIS, UNSPECIFIED TRIGGER: ICD-10-CM

## 2022-10-06 DIAGNOSIS — N18.32 TYPE 2 DIABETES MELLITUS WITH STAGE 3B CHRONIC KIDNEY DISEASE, WITHOUT LONG-TERM CURRENT USE OF INSULIN: Primary | ICD-10-CM

## 2022-10-06 DIAGNOSIS — E11.22 TYPE 2 DIABETES MELLITUS WITH STAGE 3B CHRONIC KIDNEY DISEASE, WITHOUT LONG-TERM CURRENT USE OF INSULIN: Primary | ICD-10-CM

## 2022-10-06 DIAGNOSIS — Z23 NEEDS FLU SHOT: ICD-10-CM

## 2022-10-06 DIAGNOSIS — M26.609 TEMPOROMANDIBULAR JOINT DYSFUNCTION: ICD-10-CM

## 2022-10-06 DIAGNOSIS — I69.351 HEMIPARESIS AFFECTING RIGHT SIDE AS LATE EFFECT OF CEREBROVASCULAR ACCIDENT: ICD-10-CM

## 2022-10-06 PROCEDURE — 1101F PT FALLS ASSESS-DOCD LE1/YR: CPT | Mod: CPTII,S$GLB,, | Performed by: FAMILY MEDICINE

## 2022-10-06 PROCEDURE — G0008 FLU VACCINE - QUADRIVALENT - ADJUVANTED: ICD-10-PCS | Mod: S$GLB,,, | Performed by: FAMILY MEDICINE

## 2022-10-06 PROCEDURE — 99214 OFFICE O/P EST MOD 30 MIN: CPT | Mod: S$GLB,,, | Performed by: FAMILY MEDICINE

## 2022-10-06 PROCEDURE — 3288F PR FALLS RISK ASSESSMENT DOCUMENTED: ICD-10-PCS | Mod: CPTII,S$GLB,, | Performed by: FAMILY MEDICINE

## 2022-10-06 PROCEDURE — 3288F FALL RISK ASSESSMENT DOCD: CPT | Mod: CPTII,S$GLB,, | Performed by: FAMILY MEDICINE

## 2022-10-06 PROCEDURE — G0008 ADMIN INFLUENZA VIRUS VAC: HCPCS | Mod: S$GLB,,, | Performed by: FAMILY MEDICINE

## 2022-10-06 PROCEDURE — 1125F PR PAIN SEVERITY QUANTIFIED, PAIN PRESENT: ICD-10-PCS | Mod: CPTII,S$GLB,, | Performed by: FAMILY MEDICINE

## 2022-10-06 PROCEDURE — 3075F PR MOST RECENT SYSTOLIC BLOOD PRESS GE 130-139MM HG: ICD-10-PCS | Mod: CPTII,S$GLB,, | Performed by: FAMILY MEDICINE

## 2022-10-06 PROCEDURE — 1101F PR PT FALLS ASSESS DOC 0-1 FALLS W/OUT INJ PAST YR: ICD-10-PCS | Mod: CPTII,S$GLB,, | Performed by: FAMILY MEDICINE

## 2022-10-06 PROCEDURE — 99214 PR OFFICE/OUTPT VISIT, EST, LEVL IV, 30-39 MIN: ICD-10-PCS | Mod: S$GLB,,, | Performed by: FAMILY MEDICINE

## 2022-10-06 PROCEDURE — 99999 PR PBB SHADOW E&M-EST. PATIENT-LVL IV: CPT | Mod: PBBFAC,,, | Performed by: FAMILY MEDICINE

## 2022-10-06 PROCEDURE — 3075F SYST BP GE 130 - 139MM HG: CPT | Mod: CPTII,S$GLB,, | Performed by: FAMILY MEDICINE

## 2022-10-06 PROCEDURE — 1159F PR MEDICATION LIST DOCUMENTED IN MEDICAL RECORD: ICD-10-PCS | Mod: CPTII,S$GLB,, | Performed by: FAMILY MEDICINE

## 2022-10-06 PROCEDURE — 90694 VACC AIIV4 NO PRSRV 0.5ML IM: CPT | Mod: S$GLB,,, | Performed by: FAMILY MEDICINE

## 2022-10-06 PROCEDURE — 1125F AMNT PAIN NOTED PAIN PRSNT: CPT | Mod: CPTII,S$GLB,, | Performed by: FAMILY MEDICINE

## 2022-10-06 PROCEDURE — 90694 FLU VACCINE - QUADRIVALENT - ADJUVANTED: ICD-10-PCS | Mod: S$GLB,,, | Performed by: FAMILY MEDICINE

## 2022-10-06 PROCEDURE — 3079F DIAST BP 80-89 MM HG: CPT | Mod: CPTII,S$GLB,, | Performed by: FAMILY MEDICINE

## 2022-10-06 PROCEDURE — 3079F PR MOST RECENT DIASTOLIC BLOOD PRESSURE 80-89 MM HG: ICD-10-PCS | Mod: CPTII,S$GLB,, | Performed by: FAMILY MEDICINE

## 2022-10-06 PROCEDURE — 1159F MED LIST DOCD IN RCRD: CPT | Mod: CPTII,S$GLB,, | Performed by: FAMILY MEDICINE

## 2022-10-06 PROCEDURE — 99999 PR PBB SHADOW E&M-EST. PATIENT-LVL IV: ICD-10-PCS | Mod: PBBFAC,,, | Performed by: FAMILY MEDICINE

## 2022-10-06 RX ORDER — FLUTICASONE PROPIONATE 50 MCG
1 SPRAY, SUSPENSION (ML) NASAL DAILY
Qty: 31.6 ML | Refills: 5 | Status: SHIPPED | OUTPATIENT
Start: 2022-10-06 | End: 2024-02-05

## 2022-10-06 RX ORDER — MINERAL OIL
180 ENEMA (ML) RECTAL DAILY
COMMUNITY
End: 2022-10-06 | Stop reason: SDUPTHER

## 2022-10-06 RX ORDER — MINERAL OIL
180 ENEMA (ML) RECTAL DAILY
Qty: 90 TABLET | Refills: 3 | Status: SHIPPED | OUTPATIENT
Start: 2022-10-06 | End: 2024-02-05

## 2022-10-06 NOTE — PROGRESS NOTES
SUBJECTIVE:    Patient ID: Agapito Bass is a 77 y.o. male.    Chief Complaint: Follow-up (Pt here for DM check up/See labs/C/o Rt. Ear pain still present,seen Ms Corado on 9-26-22/Also c/o right foot pain [id number]) and Diabetes    Follow-up on this 77-year-old diabetic.    He is status post CVA with a right partial hemiparesis and expressive aphasia.    His last A1c was 7.5%.    Recent renal function show an eGFR of 56 which is an improvement last year's value of 41 and 45.    He complains of some pain in his right 1st metatarsal area.    Onset 2-3 days prior.    Is wearing his normal, standard diabetic shoes.      There is no history of gout.        Additionally complains of right ear pain.    He had seen the nurse practitioner and was given antibiotics.    Continues with pain.    He chews gum regularly on the right side.            Lab Visit on 09/30/2022   Component Date Value Ref Range Status    Hemoglobin A1C 09/30/2022 7.5 (H)  4.0 - 5.6 % Final    Estimated Avg Glucose 09/30/2022 169 (H)  68 - 131 mg/dL Final    Sodium 09/30/2022 142  136 - 145 mmol/L Final    Potassium 09/30/2022 3.5  3.5 - 5.1 mmol/L Final    Chloride 09/30/2022 106  95 - 110 mmol/L Final    CO2 09/30/2022 24  23 - 29 mmol/L Final    Glucose 09/30/2022 88  70 - 110 mg/dL Final    BUN 09/30/2022 18  8 - 23 mg/dL Final    Creatinine 09/30/2022 1.3  0.5 - 1.4 mg/dL Final    Calcium 09/30/2022 9.4  8.7 - 10.5 mg/dL Final    Anion Gap 09/30/2022 12  8 - 16 mmol/L Final    eGFR 09/30/2022 56 (A)  >60 mL/min/1.73 m^2 Final    WBC 09/30/2022 9.69  3.90 - 12.70 K/uL Final    RBC 09/30/2022 4.08 (L)  4.60 - 6.20 M/uL Final    Hemoglobin 09/30/2022 11.8 (L)  14.0 - 18.0 g/dL Final    Hematocrit 09/30/2022 35.9 (L)  40.0 - 54.0 % Final    MCV 09/30/2022 88  82 - 98 fL Final    MCH 09/30/2022 28.9  27.0 - 31.0 pg Final    MCHC 09/30/2022 32.9  32.0 - 36.0 g/dL Final    RDW 09/30/2022 14.1  11.5 - 14.5 % Final    Platelets 09/30/2022 143 (L)  150 - 450  K/uL Final    MPV 09/30/2022 10.8  9.2 - 12.9 fL Final    Immature Granulocytes 09/30/2022 0.2  0.0 - 0.5 % Final    Gran # (ANC) 09/30/2022 8.0 (H)  1.8 - 7.7 K/uL Final    Immature Grans (Abs) 09/30/2022 0.02  0.00 - 0.04 K/uL Final    Lymph # 09/30/2022 0.9 (L)  1.0 - 4.8 K/uL Final    Mono # 09/30/2022 0.6  0.3 - 1.0 K/uL Final    Eos # 09/30/2022 0.2  0.0 - 0.5 K/uL Final    Baso # 09/30/2022 0.03  0.00 - 0.20 K/uL Final    nRBC 09/30/2022 0  0 /100 WBC Final    Gran % 09/30/2022 82.3 (H)  38.0 - 73.0 % Final    Lymph % 09/30/2022 8.8 (L)  18.0 - 48.0 % Final    Mono % 09/30/2022 6.5  4.0 - 15.0 % Final    Eosinophil % 09/30/2022 1.9  0.0 - 8.0 % Final    Basophil % 09/30/2022 0.3  0.0 - 1.9 % Final    Differential Method 09/30/2022 Automated   Final   Lab Visit on 07/07/2022   Component Date Value Ref Range Status    Hemoglobin A1C 07/07/2022 7.6 (H)  4.0 - 5.6 % Final    Estimated Avg Glucose 07/07/2022 171 (H)  68 - 131 mg/dL Final       Past Medical History:   Diagnosis Date    CHF (congestive heart failure) 10/17/2019    Hyperlipemia, mixed     Hypertension     Stroke (cerebrum)      Social History     Socioeconomic History    Marital status:    Occupational History    Occupation: retired   Tobacco Use    Smoking status: Never    Smokeless tobacco: Former     Types: Chew   Substance and Sexual Activity    Alcohol use: Not Currently    Drug use: Not Currently    Sexual activity: Not Currently     Past Surgical History:   Procedure Laterality Date    ANGIOGRAPHY  10/01/2019    APPENDECTOMY  1952    CARDIAC CATHETERIZATION  07/20/2019    CARDIAC DEFIBRILLATOR PLACEMENT  03/13/2020    COLONOSCOPY  01/01/2015    ECHOCARDIOGRAPHY  07/20/2019    HERNIA REPAIR  2002     Family History   Problem Relation Age of Onset    Lung cancer Mother        Review of patient's allergies indicates:   Allergen Reactions    Invokana [canagliflozin]     Trulicity [dulaglutide]        Current Outpatient Medications:      "ACCU-CHEK SOFTCLIX LANCETS MISC, Accu-Chek Softclix Lancets, Disp: , Rfl:     apixaban (ELIQUIS) 5 mg Tab, Take 1 tablet (5 mg total) by mouth 2 (two) times daily., Disp: 180 tablet, Rfl: 3    ascorbic acid, vitamin C, 500 mg/5 mL Liqd, ascorbic acid (vitamin C) 500 mg/5 mL oral liquid  once daily., Disp: , Rfl:     blood sugar diagnostic (BLOOD GLUCOSE TEST) Strp, 3 strips by Misc.(Non-Drug; Combo Route) route once daily., Disp: 270 strip, Rfl: 3    blood-glucose meter (ACCU-CHEK MATHEW PLUS METER MISC), Accu-Chek Mathew Plus Meter, Disp: , Rfl:     candesartan (ATACAND) 8 MG tablet, Take 1 tablet (8 mg total) by mouth once daily., Disp: 90 tablet, Rfl: 3    cholecalciferol, vitamin D3, 125 mcg (5,000 unit) Tab, vitamin d 5000 iu, Disp: , Rfl:     diltiaZEM (CARDIZEM) 60 MG tablet, Take 1 tablet (60 mg total) by mouth 2 (two) times a day., Disp: 180 tablet, Rfl: 3    furosemide (LASIX) 20 MG tablet, Take 1 tablet (20 mg total) by mouth 2 (two) times a day., Disp: 180 tablet, Rfl: 3    hydrOXYzine HCL (ATARAX) 25 MG tablet, Take 1 tablet (25 mg total) by mouth 2 (two) times a day., Disp: 180 tablet, Rfl: 3    insulin (LANTUS SOLOSTAR U-100 INSULIN) glargine 100 units/mL (3mL) SubQ pen, Inject 25 Units into the skin every evening. (Patient taking differently: Inject 35 Units into the skin every evening.), Disp: 24 mL, Rfl: 3    metFORMIN (GLUCOPHAGE-XR) 500 MG ER 24hr tablet, 1 q am and 2 q hs (Patient taking differently: Take 500 mg by mouth once. H.s.), Disp: 270 tablet, Rfl: 3    metoprolol tartrate (LOPRESSOR) 100 MG tablet, Take 1 tablet (100 mg total) by mouth 2 (two) times daily., Disp: 180 tablet, Rfl: 3    multivitamin with minerals tablet, complete senior vitamins and m, Disp: , Rfl:     pantoprazole (PROTONIX) 40 MG tablet, Take 1 tablet (40 mg total) by mouth once daily., Disp: 90 tablet, Rfl: 3    pen needle, diabetic 32 gauge x 5/32" Ndle, Use daily with lantus injection at night for diabetes with " "hyperglycemia, Disp: 90 each, Rfl: 3    pravastatin (PRAVACHOL) 40 MG tablet, Take 1 tablet (40 mg total) by mouth once daily., Disp: 90 tablet, Rfl: 3    QUEtiapine (SEROQUEL) 50 MG tablet, Take 1 tablet (50 mg total) by mouth nightly., Disp: 90 tablet, Rfl: 3    tamsulosin (FLOMAX) 0.4 mg Cap, Take 1 capsule (0.4 mg total) by mouth every evening., Disp: 90 capsule, Rfl: 3    terazosin (HYTRIN) 2 MG capsule, Take 1 capsule (2 mg total) by mouth nightly., Disp: 90 capsule, Rfl: 3    amoxicillin-clavulanate 875-125mg (AUGMENTIN) 875-125 mg per tablet, Take 1 tablet by mouth 2 (two) times daily. for 10 days (Patient not taking: Reported on 10/6/2022), Disp: 20 tablet, Rfl: 0    docusate sodium (COLACE) 100 MG capsule, 100 mg., Disp: , Rfl:     ELIQUIS 5 mg Tab, , Disp: , Rfl:     fexofenadine (ALLEGRA ALLERGY) 180 MG tablet, Take 1 tablet (180 mg total) by mouth once daily., Disp: 90 tablet, Rfl: 3    fluticasone propionate (FLONASE) 50 mcg/actuation nasal spray, 1 spray (50 mcg total) by Each Nostril route once daily., Disp: 31.6 mL, Rfl: 5    Review of Systems   Constitutional:               No chest pain.    No tachycardia.    No swelling in the ankles.    Pain in his right TMJ area.    Pain in his right 1st metatarsal.             Objective:      Vitals:    10/06/22 1034   BP: 130/80   Pulse: 61   Temp: 98 °F (36.7 °C)   SpO2: 96%   Weight: 85.7 kg (189 lb)   Height: 5' 3" (1.6 m)     Physical Exam  Constitutional:       Comments:       Heart is regular rate and rhythm.    No carotid bruits.    Lungs are clear to auscultation.    No pretibial edema is noted.      Right TMJ tenderness some mild swelling over the TMJ joint.      Mild tenderness in the 1st metatarsal head.    There is no erythema.    No swelling.    Full range of motion.      Diabetic foot exam reveals good pulses bilaterally.    There are no sores or calluses noted.               Assessment:       1. Type 2 diabetes mellitus with stage 3b chronic " kidney disease, without long-term current use of insulin    2. Hemiparesis affecting right side as late effect of cerebrovascular accident    3. Apraxia of speech    4. Temporomandibular joint dysfunction    5. Seasonal allergic rhinitis, unspecified trigger    6. Needs flu shot           Plan:       Type 2 diabetes mellitus with stage 3b chronic kidney disease, without long-term current use of insulin  -     Cancel: Hemoglobin A1c; Future; Expected date: 10/06/2022  -     Cancel: Basic metabolic panel; Future; Expected date: 10/06/2022  -     Basic metabolic panel; Future; Expected date: 01/06/2023  -     Hemoglobin A1c; Future; Expected date: 01/06/2023    Hemiparesis affecting right side as late effect of cerebrovascular accident    Apraxia of speech    Temporomandibular joint dysfunction    Seasonal allergic rhinitis, unspecified trigger  -     fexofenadine (ALLEGRA ALLERGY) 180 MG tablet; Take 1 tablet (180 mg total) by mouth once daily.  Dispense: 90 tablet; Refill: 3  -     fluticasone propionate (FLONASE) 50 mcg/actuation nasal spray; 1 spray (50 mcg total) by Each Nostril route once daily.  Dispense: 31.6 mL; Refill: 5    Needs flu shot    Follow up in about 3 months (around 1/6/2023).      Reduce chewing gum until jaw gets better.      Take iron pill 3 times a week.      Use topical Aspercreme or Rudy-Ham on right 1st metatarsal 3 times a day..      Use Flonase.      Get flu shot today.      10/6/2022 César Mejia M.D.

## 2022-10-06 NOTE — PATIENT INSTRUCTIONS
Reduce chewing gum until jaw gets better.      Take iron pill 3 times a week.      Use topical Aspercreme or Rudy-Ham on right 1st metatarsal 3 times a day..      Use Flonase.      Get flu shot today.

## 2022-10-27 ENCOUNTER — TELEPHONE (OUTPATIENT)
Dept: FAMILY MEDICINE | Facility: CLINIC | Age: 77
End: 2022-10-27
Payer: MEDICARE

## 2022-10-27 NOTE — TELEPHONE ENCOUNTER
Spoke to wife pt was in [facility] for CHF.He was discharged yesterday,Scheduled appt on 11-3-22 pt to bring in all medication bottles as some have changed.      ----- Message from Tanya Rodriguez sent at 10/27/2022  8:59 AM CDT -----  Contact: pt  Type: Needs Medical Advice         Who Called: pt  Best Call Back Number:[phone]  Additional Information: Requesting a call back regarding pt needs a hospital follow up. Please call back  Please Advise- Thank you

## 2022-11-08 ENCOUNTER — OFFICE VISIT (OUTPATIENT)
Dept: FAMILY MEDICINE | Facility: CLINIC | Age: 77
End: 2022-11-08
Payer: MEDICARE

## 2022-11-08 VITALS
SYSTOLIC BLOOD PRESSURE: 110 MMHG | TEMPERATURE: 98 F | BODY MASS INDEX: 32.07 KG/M2 | DIASTOLIC BLOOD PRESSURE: 70 MMHG | HEART RATE: 75 BPM | WEIGHT: 181 LBS | HEIGHT: 63 IN | OXYGEN SATURATION: 97 %

## 2022-11-08 DIAGNOSIS — I48.0 PAROXYSMAL ATRIAL FIBRILLATION: ICD-10-CM

## 2022-11-08 DIAGNOSIS — E11.21 MICROALBUMINURIC DIABETIC NEPHROPATHY: ICD-10-CM

## 2022-11-08 DIAGNOSIS — I50.9 CONGESTIVE HEART FAILURE, UNSPECIFIED HF CHRONICITY, UNSPECIFIED HEART FAILURE TYPE: Primary | ICD-10-CM

## 2022-11-08 DIAGNOSIS — E11.21 DIABETIC GLOMERULOPATHY: ICD-10-CM

## 2022-11-08 PROCEDURE — 3288F FALL RISK ASSESSMENT DOCD: CPT | Mod: CPTII,S$GLB,, | Performed by: FAMILY MEDICINE

## 2022-11-08 PROCEDURE — 1126F PR PAIN SEVERITY QUANTIFIED, NO PAIN PRESENT: ICD-10-PCS | Mod: CPTII,S$GLB,, | Performed by: FAMILY MEDICINE

## 2022-11-08 PROCEDURE — 1126F AMNT PAIN NOTED NONE PRSNT: CPT | Mod: CPTII,S$GLB,, | Performed by: FAMILY MEDICINE

## 2022-11-08 PROCEDURE — 1160F PR REVIEW ALL MEDS BY PRESCRIBER/CLIN PHARMACIST DOCUMENTED: ICD-10-PCS | Mod: CPTII,S$GLB,, | Performed by: FAMILY MEDICINE

## 2022-11-08 PROCEDURE — 99214 PR OFFICE/OUTPT VISIT, EST, LEVL IV, 30-39 MIN: ICD-10-PCS | Mod: S$GLB,,, | Performed by: FAMILY MEDICINE

## 2022-11-08 PROCEDURE — 99214 OFFICE O/P EST MOD 30 MIN: CPT | Mod: S$GLB,,, | Performed by: FAMILY MEDICINE

## 2022-11-08 PROCEDURE — 1101F PT FALLS ASSESS-DOCD LE1/YR: CPT | Mod: CPTII,S$GLB,, | Performed by: FAMILY MEDICINE

## 2022-11-08 PROCEDURE — 1159F MED LIST DOCD IN RCRD: CPT | Mod: CPTII,S$GLB,, | Performed by: FAMILY MEDICINE

## 2022-11-08 PROCEDURE — 3074F SYST BP LT 130 MM HG: CPT | Mod: CPTII,S$GLB,, | Performed by: FAMILY MEDICINE

## 2022-11-08 PROCEDURE — 1159F PR MEDICATION LIST DOCUMENTED IN MEDICAL RECORD: ICD-10-PCS | Mod: CPTII,S$GLB,, | Performed by: FAMILY MEDICINE

## 2022-11-08 PROCEDURE — 99999 PR PBB SHADOW E&M-EST. PATIENT-LVL IV: CPT | Mod: PBBFAC,,, | Performed by: FAMILY MEDICINE

## 2022-11-08 PROCEDURE — 3074F PR MOST RECENT SYSTOLIC BLOOD PRESSURE < 130 MM HG: ICD-10-PCS | Mod: CPTII,S$GLB,, | Performed by: FAMILY MEDICINE

## 2022-11-08 PROCEDURE — 1101F PR PT FALLS ASSESS DOC 0-1 FALLS W/OUT INJ PAST YR: ICD-10-PCS | Mod: CPTII,S$GLB,, | Performed by: FAMILY MEDICINE

## 2022-11-08 PROCEDURE — 3288F PR FALLS RISK ASSESSMENT DOCUMENTED: ICD-10-PCS | Mod: CPTII,S$GLB,, | Performed by: FAMILY MEDICINE

## 2022-11-08 PROCEDURE — 3078F PR MOST RECENT DIASTOLIC BLOOD PRESSURE < 80 MM HG: ICD-10-PCS | Mod: CPTII,S$GLB,, | Performed by: FAMILY MEDICINE

## 2022-11-08 PROCEDURE — 3078F DIAST BP <80 MM HG: CPT | Mod: CPTII,S$GLB,, | Performed by: FAMILY MEDICINE

## 2022-11-08 PROCEDURE — 1160F RVW MEDS BY RX/DR IN RCRD: CPT | Mod: CPTII,S$GLB,, | Performed by: FAMILY MEDICINE

## 2022-11-08 PROCEDURE — 99999 PR PBB SHADOW E&M-EST. PATIENT-LVL IV: ICD-10-PCS | Mod: PBBFAC,,, | Performed by: FAMILY MEDICINE

## 2022-11-08 RX ORDER — SACUBITRIL AND VALSARTAN 24; 26 MG/1; MG/1
1 TABLET, FILM COATED ORAL 2 TIMES DAILY
COMMUNITY

## 2022-11-08 RX ORDER — INSULIN ASPART 100 [IU]/ML
INJECTION, SOLUTION INTRAVENOUS; SUBCUTANEOUS
Qty: 3 ML | Refills: 0
Start: 2022-11-08

## 2022-11-08 RX ORDER — FUROSEMIDE 40 MG/1
40 TABLET ORAL 2 TIMES DAILY
Qty: 180 TABLET | Refills: 0
Start: 2022-11-08 | End: 2023-08-08 | Stop reason: SDUPTHER

## 2022-11-08 RX ORDER — FUROSEMIDE 20 MG/1
40 TABLET ORAL 2 TIMES DAILY
Status: CANCELLED | OUTPATIENT
Start: 2022-11-08

## 2022-11-08 NOTE — PROGRESS NOTES
SUBJECTIVE:    Patient ID: Agapito Bass is a 77 y.o. male.    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    He is admitted to [facility] for acute congestive heart failure.    He was in atrial fib at that time.    He apparently had an echocardiogram that showed a significant decrease in ejection fraction.    His ARB and calcium channel blockers were discontinued..    He was started on Entresto by Cardiology.    his Lasix was increased to 40 mg twice a day.    He is to follow up with his cardiologist Dr. Lane this week.    He has an appointment to see an endocrinologist in 3 weeks.    His metformin was discontinued.    he was placed on NovoLog 3 units 3 times a day following a sliding scale.    He is continued on his Lantus at 30 units a day.            Lab Visit on 09/30/2022   Component Date Value Ref Range Status    Hemoglobin A1C 09/30/2022 7.5 (H)  4.0 - 5.6 % Final    Estimated Avg Glucose 09/30/2022 169 (H)  68 - 131 mg/dL Final    Sodium 09/30/2022 142  136 - 145 mmol/L Final    Potassium 09/30/2022 3.5  3.5 - 5.1 mmol/L Final    Chloride 09/30/2022 106  95 - 110 mmol/L Final    CO2 09/30/2022 24  23 - 29 mmol/L Final    Glucose 09/30/2022 88  70 - 110 mg/dL Final    BUN 09/30/2022 18  8 - 23 mg/dL Final    Creatinine 09/30/2022 1.3  0.5 - 1.4 mg/dL Final    Calcium 09/30/2022 9.4  8.7 - 10.5 mg/dL Final    Anion Gap 09/30/2022 12  8 - 16 mmol/L Final    eGFR 09/30/2022 56 (A)  >60 mL/min/1.73 m^2 Final    WBC 09/30/2022 9.69  3.90 - 12.70 K/uL Final    RBC 09/30/2022 4.08 (L)  4.60 - 6.20 M/uL Final    Hemoglobin 09/30/2022 11.8 (L)  14.0 - 18.0 g/dL Final    Hematocrit 09/30/2022 35.9 (L)  40.0 - 54.0 % Final    MCV 09/30/2022 88  82 - 98 fL Final    MCH 09/30/2022 28.9  27.0 - 31.0 pg Final    MCHC 09/30/2022 32.9  32.0 - 36.0 g/dL Final    RDW 09/30/2022 14.1  11.5 - 14.5 % Final     Platelets 09/30/2022 143 (L)  150 - 450 K/uL Final    MPV 09/30/2022 10.8  9.2 - 12.9 fL Final    Immature Granulocytes 09/30/2022 0.2  0.0 - 0.5 % Final    Gran # (ANC) 09/30/2022 8.0 (H)  1.8 - 7.7 K/uL Final    Immature Grans (Abs) 09/30/2022 0.02  0.00 - 0.04 K/uL Final    Lymph # 09/30/2022 0.9 (L)  1.0 - 4.8 K/uL Final    Mono # 09/30/2022 0.6  0.3 - 1.0 K/uL Final    Eos # 09/30/2022 0.2  0.0 - 0.5 K/uL Final    Baso # 09/30/2022 0.03  0.00 - 0.20 K/uL Final    nRBC 09/30/2022 0  0 /100 WBC Final    Gran % 09/30/2022 82.3 (H)  38.0 - 73.0 % Final    Lymph % 09/30/2022 8.8 (L)  18.0 - 48.0 % Final    Mono % 09/30/2022 6.5  4.0 - 15.0 % Final    Eosinophil % 09/30/2022 1.9  0.0 - 8.0 % Final    Basophil % 09/30/2022 0.3  0.0 - 1.9 % Final    Differential Method 09/30/2022 Automated   Final   Lab Visit on 07/07/2022   Component Date Value Ref Range Status    Hemoglobin A1C 07/07/2022 7.6 (H)  4.0 - 5.6 % Final    Estimated Avg Glucose 07/07/2022 171 (H)  68 - 131 mg/dL Final       Past Medical History:   Diagnosis Date    CHF (congestive heart failure) 10/17/2019    Hyperlipemia, mixed     Hypertension     Stroke (cerebrum)      Social History     Socioeconomic History    Marital status:    Occupational History    Occupation: retired   Tobacco Use    Smoking status: Never    Smokeless tobacco: Former     Types: Chew   Substance and Sexual Activity    Alcohol use: Not Currently    Drug use: Not Currently    Sexual activity: Not Currently     Past Surgical History:   Procedure Laterality Date    ANGIOGRAPHY  10/01/2019    APPENDECTOMY  1952    CARDIAC CATHETERIZATION  07/20/2019    CARDIAC DEFIBRILLATOR PLACEMENT  03/13/2020    COLONOSCOPY  01/01/2015    ECHOCARDIOGRAPHY  07/20/2019    HERNIA REPAIR  2002     Family History   Problem Relation Age of Onset    Lung cancer Mother        Review of patient's allergies indicates:   Allergen Reactions    Invokana [canagliflozin]     Trulicity [dulaglutide]   "      Current Outpatient Medications:     ACCU-CHEK SOFTCLIX LANCETS MISC, Accu-Chek Softclix Lancets, Disp: , Rfl:     ascorbic acid, vitamin C, 500 mg/5 mL Liqd, ascorbic acid (vitamin C) 500 mg/5 mL oral liquid  once daily., Disp: , Rfl:     blood sugar diagnostic (BLOOD GLUCOSE TEST) Strp, 3 strips by Misc.(Non-Drug; Combo Route) route once daily., Disp: 270 strip, Rfl: 3    blood-glucose meter (ACCU-CHEK MATHEW PLUS METER MISC), Accu-Chek Mathew Plus Meter, Disp: , Rfl:     cholecalciferol, vitamin D3, 125 mcg (5,000 unit) Tab, vitamin d 5000 iu, Disp: , Rfl:     docusate sodium (COLACE) 100 MG capsule, 100 mg., Disp: , Rfl:     fexofenadine (ALLEGRA ALLERGY) 180 MG tablet, Take 1 tablet (180 mg total) by mouth once daily., Disp: 90 tablet, Rfl: 3    fluticasone propionate (FLONASE) 50 mcg/actuation nasal spray, 1 spray (50 mcg total) by Each Nostril route once daily., Disp: 31.6 mL, Rfl: 5    insulin (LANTUS SOLOSTAR U-100 INSULIN) glargine 100 units/mL (3mL) SubQ pen, Inject 25 Units into the skin every evening. (Patient taking differently: Inject 35 Units into the skin every evening. 30 units qd), Disp: 24 mL, Rfl: 3    metoprolol tartrate (LOPRESSOR) 100 MG tablet, Take 1 tablet (100 mg total) by mouth 2 (two) times daily., Disp: 180 tablet, Rfl: 3    multivitamin with minerals tablet, complete senior vitamins and m, Disp: , Rfl:     pantoprazole (PROTONIX) 40 MG tablet, Take 1 tablet (40 mg total) by mouth once daily., Disp: 90 tablet, Rfl: 3    pen needle, diabetic 32 gauge x 5/32" Ndle, Use daily with lantus injection at night for diabetes with hyperglycemia, Disp: 90 each, Rfl: 3    pravastatin (PRAVACHOL) 40 MG tablet, Take 1 tablet (40 mg total) by mouth once daily., Disp: 90 tablet, Rfl: 3    QUEtiapine (SEROQUEL) 50 MG tablet, Take 1 tablet (50 mg total) by mouth nightly., Disp: 90 tablet, Rfl: 3    sacubitriL-valsartan (ENTRESTO) 24-26 mg per tablet, Take 1 tablet by mouth 2 (two) times daily., Disp: " ", Rfl:     tamsulosin (FLOMAX) 0.4 mg Cap, Take 1 capsule (0.4 mg total) by mouth every evening., Disp: 90 capsule, Rfl: 3    apixaban (ELIQUIS) 5 mg Tab, Take 1 tablet (5 mg total) by mouth 2 (two) times daily., Disp: 180 tablet, Rfl: 3    ELIQUIS 5 mg Tab, , Disp: , Rfl:     furosemide (LASIX) 40 MG tablet, Take 1 tablet (40 mg total) by mouth 2 (two) times daily., Disp: 180 tablet, Rfl: 0    insulin aspart U-100 (NOVOLOG FLEXPEN U-100 INSULIN) 100 unit/mL (3 mL) InPn pen, 3 units 3 times a day.  follow sliding scale., Disp: 3 mL, Rfl: 0    Review of Systems   Constitutional:               Improved shortness of breath.    No abdominal swelling.    No peripheral edema.             Objective:      Vitals:    11/08/22 1312   BP: 110/70   Pulse: 75   Temp: 98.4 °F (36.9 °C)   SpO2: 97%   Weight: 82.1 kg (181 lb)   Height: 5' 3" (1.6 m)     Physical Exam  Constitutional:       Comments:       Lungs clear to auscultation.    there is no pretibial edema.    Heart is in a regular rate and rhythm at 75.    S1-S2 are perceived as normal.    there is a questionable S4.             Assessment:       1. Congestive heart failure, unspecified HF chronicity, unspecified heart failure type    2. Paroxysmal atrial fibrillation    3. Microalbuminuric diabetic nephropathy    4. Diabetic glomerulopathy           Plan:       Congestive heart failure, unspecified HF chronicity, unspecified heart failure type  -     furosemide (LASIX) 40 MG tablet; Take 1 tablet (40 mg total) by mouth 2 (two) times daily.  Dispense: 180 tablet; Refill: 0    Paroxysmal atrial fibrillation  -     apixaban (ELIQUIS) 5 mg Tab; Take 1 tablet (5 mg total) by mouth 2 (two) times daily.  Dispense: 180 tablet; Refill: 3    Microalbuminuric diabetic nephropathy  -     insulin aspart U-100 (NOVOLOG FLEXPEN U-100 INSULIN) 100 unit/mL (3 mL) InPn pen; 3 units 3 times a day.  follow sliding scale.  Dispense: 3 mL; Refill: 0    Diabetic glomerulopathy  -     insulin " aspart U-100 (NOVOLOG FLEXPEN U-100 INSULIN) 100 unit/mL (3 mL) InPn pen; 3 units 3 times a day.  follow sliding scale.  Dispense: 3 mL; Refill: 0    Follow up Keep appointment with Dr. Calhoun.      Follow-up with Endocrinology.     Follow-up with Cardiology.           11/8/2022 César Mejia M.D.

## 2023-01-10 ENCOUNTER — LAB VISIT (OUTPATIENT)
Dept: LAB | Facility: CLINIC | Age: 78
End: 2023-01-10
Payer: MEDICARE

## 2023-01-10 DIAGNOSIS — N18.32 TYPE 2 DIABETES MELLITUS WITH STAGE 3B CHRONIC KIDNEY DISEASE, WITHOUT LONG-TERM CURRENT USE OF INSULIN: ICD-10-CM

## 2023-01-10 DIAGNOSIS — E11.22 TYPE 2 DIABETES MELLITUS WITH STAGE 3B CHRONIC KIDNEY DISEASE, WITHOUT LONG-TERM CURRENT USE OF INSULIN: ICD-10-CM

## 2023-01-10 LAB
ANION GAP SERPL CALC-SCNC: 11 MMOL/L (ref 8–16)
BUN SERPL-MCNC: 27 MG/DL (ref 8–23)
CALCIUM SERPL-MCNC: 9.3 MG/DL (ref 8.7–10.5)
CHLORIDE SERPL-SCNC: 108 MMOL/L (ref 95–110)
CO2 SERPL-SCNC: 23 MMOL/L (ref 23–29)
CREAT SERPL-MCNC: 1.4 MG/DL (ref 0.5–1.4)
EST. GFR  (NO RACE VARIABLE): 51 ML/MIN/1.73 M^2
GLUCOSE SERPL-MCNC: 111 MG/DL (ref 70–110)
POTASSIUM SERPL-SCNC: 3.8 MMOL/L (ref 3.5–5.1)
SODIUM SERPL-SCNC: 142 MMOL/L (ref 136–145)

## 2023-01-10 PROCEDURE — 80048 BASIC METABOLIC PNL TOTAL CA: CPT | Performed by: FAMILY MEDICINE

## 2023-01-10 PROCEDURE — 83036 HEMOGLOBIN GLYCOSYLATED A1C: CPT | Performed by: FAMILY MEDICINE

## 2023-01-11 ENCOUNTER — PATIENT MESSAGE (OUTPATIENT)
Dept: FAMILY MEDICINE | Facility: CLINIC | Age: 78
End: 2023-01-11
Payer: MEDICARE

## 2023-01-11 LAB
ESTIMATED AVG GLUCOSE: 148 MG/DL (ref 68–131)
HBA1C MFR BLD: 6.8 % (ref 4–5.6)

## 2023-01-12 ENCOUNTER — TELEPHONE (OUTPATIENT)
Dept: FAMILY MEDICINE | Facility: CLINIC | Age: 78
End: 2023-01-12
Payer: MEDICARE

## 2023-01-12 ENCOUNTER — OFFICE VISIT (OUTPATIENT)
Dept: FAMILY MEDICINE | Facility: CLINIC | Age: 78
End: 2023-01-12
Payer: MEDICARE

## 2023-01-12 VITALS
DIASTOLIC BLOOD PRESSURE: 72 MMHG | TEMPERATURE: 98 F | WEIGHT: 184.31 LBS | SYSTOLIC BLOOD PRESSURE: 118 MMHG | HEART RATE: 71 BPM | HEIGHT: 63 IN | OXYGEN SATURATION: 95 % | BODY MASS INDEX: 32.66 KG/M2

## 2023-01-12 DIAGNOSIS — K21.9 GASTROESOPHAGEAL REFLUX DISEASE, UNSPECIFIED WHETHER ESOPHAGITIS PRESENT: ICD-10-CM

## 2023-01-12 DIAGNOSIS — I69.351 HEMIPARESIS AFFECTING RIGHT SIDE AS LATE EFFECT OF CEREBROVASCULAR ACCIDENT: ICD-10-CM

## 2023-01-12 DIAGNOSIS — I50.9 CONGESTIVE HEART FAILURE, UNSPECIFIED HF CHRONICITY, UNSPECIFIED HEART FAILURE TYPE: ICD-10-CM

## 2023-01-12 DIAGNOSIS — N18.31 TYPE 2 DIABETES MELLITUS WITH STAGE 3A CHRONIC KIDNEY DISEASE, WITHOUT LONG-TERM CURRENT USE OF INSULIN: ICD-10-CM

## 2023-01-12 DIAGNOSIS — E11.22 TYPE 2 DIABETES MELLITUS WITH STAGE 3A CHRONIC KIDNEY DISEASE, WITHOUT LONG-TERM CURRENT USE OF INSULIN: ICD-10-CM

## 2023-01-12 DIAGNOSIS — E66.9 OBESITY (BMI 30.0-34.9): ICD-10-CM

## 2023-01-12 DIAGNOSIS — E78.5 HYPERLIPIDEMIA, UNSPECIFIED HYPERLIPIDEMIA TYPE: ICD-10-CM

## 2023-01-12 DIAGNOSIS — F33.42 MAJOR DEPRESSIVE DISORDER, RECURRENT EPISODE, IN FULL REMISSION: ICD-10-CM

## 2023-01-12 DIAGNOSIS — I10 ESSENTIAL HYPERTENSION: ICD-10-CM

## 2023-01-12 DIAGNOSIS — Z76.0 ENCOUNTER FOR MEDICATION REFILL: Primary | ICD-10-CM

## 2023-01-12 DIAGNOSIS — Z97.4 HEARING AID WORN: ICD-10-CM

## 2023-01-12 DIAGNOSIS — N18.31 STAGE 3A CHRONIC KIDNEY DISEASE: ICD-10-CM

## 2023-01-12 PROCEDURE — 99214 OFFICE O/P EST MOD 30 MIN: CPT | Mod: ,,, | Performed by: FAMILY MEDICINE

## 2023-01-12 PROCEDURE — 99214 PR OFFICE/OUTPT VISIT, EST, LEVL IV, 30-39 MIN: ICD-10-PCS | Mod: ,,, | Performed by: FAMILY MEDICINE

## 2023-01-12 PROCEDURE — 1101F PR PT FALLS ASSESS DOC 0-1 FALLS W/OUT INJ PAST YR: ICD-10-PCS | Mod: CPTII,,, | Performed by: FAMILY MEDICINE

## 2023-01-12 PROCEDURE — 1160F RVW MEDS BY RX/DR IN RCRD: CPT | Mod: CPTII,,, | Performed by: FAMILY MEDICINE

## 2023-01-12 PROCEDURE — 1160F PR REVIEW ALL MEDS BY PRESCRIBER/CLIN PHARMACIST DOCUMENTED: ICD-10-PCS | Mod: CPTII,,, | Performed by: FAMILY MEDICINE

## 2023-01-12 PROCEDURE — 1101F PT FALLS ASSESS-DOCD LE1/YR: CPT | Mod: CPTII,,, | Performed by: FAMILY MEDICINE

## 2023-01-12 PROCEDURE — 3078F PR MOST RECENT DIASTOLIC BLOOD PRESSURE < 80 MM HG: ICD-10-PCS | Mod: CPTII,,, | Performed by: FAMILY MEDICINE

## 2023-01-12 PROCEDURE — 1159F PR MEDICATION LIST DOCUMENTED IN MEDICAL RECORD: ICD-10-PCS | Mod: CPTII,,, | Performed by: FAMILY MEDICINE

## 2023-01-12 PROCEDURE — 3074F PR MOST RECENT SYSTOLIC BLOOD PRESSURE < 130 MM HG: ICD-10-PCS | Mod: CPTII,,, | Performed by: FAMILY MEDICINE

## 2023-01-12 PROCEDURE — 3288F PR FALLS RISK ASSESSMENT DOCUMENTED: ICD-10-PCS | Mod: CPTII,,, | Performed by: FAMILY MEDICINE

## 2023-01-12 PROCEDURE — 1126F PR PAIN SEVERITY QUANTIFIED, NO PAIN PRESENT: ICD-10-PCS | Mod: CPTII,,, | Performed by: FAMILY MEDICINE

## 2023-01-12 PROCEDURE — 1159F MED LIST DOCD IN RCRD: CPT | Mod: CPTII,,, | Performed by: FAMILY MEDICINE

## 2023-01-12 PROCEDURE — 3074F SYST BP LT 130 MM HG: CPT | Mod: CPTII,,, | Performed by: FAMILY MEDICINE

## 2023-01-12 PROCEDURE — 3288F FALL RISK ASSESSMENT DOCD: CPT | Mod: CPTII,,, | Performed by: FAMILY MEDICINE

## 2023-01-12 PROCEDURE — 3078F DIAST BP <80 MM HG: CPT | Mod: CPTII,,, | Performed by: FAMILY MEDICINE

## 2023-01-12 PROCEDURE — 1126F AMNT PAIN NOTED NONE PRSNT: CPT | Mod: CPTII,,, | Performed by: FAMILY MEDICINE

## 2023-01-12 RX ORDER — METOPROLOL TARTRATE 100 MG/1
100 TABLET ORAL 2 TIMES DAILY
Qty: 180 TABLET | Refills: 3 | Status: SHIPPED | OUTPATIENT
Start: 2023-01-12

## 2023-01-12 RX ORDER — PANTOPRAZOLE SODIUM 40 MG/1
40 TABLET, DELAYED RELEASE ORAL DAILY
Qty: 90 TABLET | Refills: 3 | Status: SHIPPED | OUTPATIENT
Start: 2023-01-12 | End: 2023-11-30

## 2023-01-12 RX ORDER — QUETIAPINE FUMARATE 50 MG/1
50 TABLET, FILM COATED ORAL NIGHTLY
Qty: 90 TABLET | Refills: 3 | Status: SHIPPED | OUTPATIENT
Start: 2023-01-12

## 2023-01-12 RX ORDER — PRAVASTATIN SODIUM 40 MG/1
40 TABLET ORAL DAILY
Qty: 90 TABLET | Refills: 3 | Status: SHIPPED | OUTPATIENT
Start: 2023-01-12 | End: 2023-11-20

## 2023-01-12 NOTE — PROGRESS NOTES
Subjective:       Patient ID: Agapito Bass is a 77 y.o. male.    Chief Complaint: Follow-up and Medication Refill    This patient is new to me.  Agapito Bass presents to the clinic today with his wife for medication refills.  Patient reports he has been doing well with no complaints today.    Previous blood work reviewed with patient and his wife in detail today.  Patient will be due for fasting blood work in April and this was ordered today.    Patient educated on plan of care, verbalized understanding.    Review of Systems   Constitutional:  Negative for activity change, appetite change, chills, diaphoresis and fever.   HENT:  Negative for congestion, ear pain, postnasal drip, sinus pressure, sneezing and sore throat.    Eyes:  Negative for pain, discharge, redness and itching.   Respiratory:  Negative for apnea, cough, chest tightness, shortness of breath and wheezing.    Cardiovascular:  Negative for chest pain and leg swelling.   Gastrointestinal:  Negative for abdominal distention, abdominal pain, constipation, diarrhea, nausea and vomiting.   Genitourinary:  Negative for difficulty urinating, dysuria, flank pain and frequency.   Skin:  Negative for color change, rash and wound.   Neurological:  Negative for dizziness.     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   Constitutional:       General: He is not in acute distress.     Appearance: Normal appearance. He is well-developed.   HENT:      Head: Normocephalic.      Nose: Nose normal.   Eyes:      Conjunctiva/sclera: Conjunctivae normal.      Pupils: Pupils are equal, round, and reactive to light.   Cardiovascular:      Rate and Rhythm: Normal rate and regular rhythm.      Heart sounds: Normal heart sounds.   Pulmonary:      Effort: Pulmonary effort is normal. No respiratory distress.      Breath sounds: Normal breath sounds.   Musculoskeletal:      Cervical back: Normal range of motion and neck supple.      Right lower leg: No edema.      Left lower leg: No edema.   Skin:     General: Skin is warm and dry.      Findings: No rash.   Neurological:      Mental Status: He is alert and oriented to person, place, and time.   Psychiatric:         Mood and Affect: Mood normal.         Behavior: Behavior normal.       Lab Results   Component Value Date    WBC 9.69 09/30/2022    HGB 11.8 (L) 09/30/2022    HCT 35.9 (L) 09/30/2022     (L) 09/30/2022    CHOL 103 (L) 04/01/2022    TRIG 77 04/01/2022    HDL 27 (L) 04/01/2022    ALT 26 01/15/2021    AST 26 01/15/2021     01/10/2023    K 3.8 01/10/2023     01/10/2023    CREATININE 1.4 01/10/2023    BUN 27 (H) 01/10/2023    CO2 23 01/10/2023    PSA 1.6 10/05/2021    HGBA1C 6.8 (H) 01/10/2023     The ASCVD Risk score (Erving DK, et al., 2019) failed to calculate for the following reasons:    The valid total cholesterol range is 130 to 320 mg/dL  Visit Vitals  /72 (BP Location: Right arm, Patient Position: Sitting, BP Method: Large (Manual))   Pulse 71   Temp 98.4 °F (36.9 °C) (Oral)   Ht 5' 3" (1.6 m)   Wt 83.6 kg (184 lb 4.9 oz)   SpO2 95%   BMI 32.65 kg/m²      Assessment:       1. " Encounter for medication refill    2. Obesity (BMI 30.0-34.9)    3. Essential hypertension    4. Hyperlipidemia, unspecified hyperlipidemia type    5. Hemiparesis affecting right side as late effect of cerebrovascular accident    6. Congestive heart failure, unspecified HF chronicity, unspecified heart failure type    7. Type 2 diabetes mellitus with stage 3a chronic kidney disease, without long-term current use of insulin    8. Stage 3a chronic kidney disease    9. Gastroesophageal reflux disease, unspecified whether esophagitis present    10. Hearing aid worn    11. Major depressive disorder, recurrent episode, in full remission          Plan:       Agapito was seen today for follow-up and medication refill.    Diagnoses and all orders for this visit:    Encounter for medication refill    Obesity (BMI 30.0-34.9)  Body mass index is 32.65 kg/m².  Continue healthy diet and regular exercise as tolerated.  Continue medications as prescribed.  Follow up with PCP     Essential hypertension / Hyperlipidemia, unspecified hyperlipidemia type / Congestive heart failure, unspecified HF chronicity, unspecified heart failure type  -     metoprolol tartrate (LOPRESSOR) 100 MG tablet; Take 1 tablet (100 mg total) by mouth 2 (two) times daily.  -     pravastatin (PRAVACHOL) 40 MG tablet; Take 1 tablet (40 mg total) by mouth once daily.  -     Lipid Panel; Future  -     TSH; Future  -     T4, Free; Future  Stable  Continue medications as prescribed.  Follow up with PCP and cardiology    Hemiparesis affecting right side as late effect of cerebrovascular accident  Stable  Continue medications as prescribed.  Follow up with PCP and neurology    Type 2 diabetes mellitus with stage 3a chronic kidney disease, without long-term current use of insulin  -     blood sugar diagnostic (BLOOD GLUCOSE TEST) Strp; 3 strips by Misc.(Non-Drug; Combo Route) route once daily.  -     Microalbumin/Creatinine Ratio, Urine; Future  -     CBC Auto  Differential; Future  -     Comprehensive Metabolic Panel; Future  -     Hemoglobin A1C; Future  Stable  Continue medications as prescribed.  Follow up with PCP and endocrinology, [facility]    Stage 3a chronic kidney disease  Stable  Continue medications as prescribed.  Follow up with PCP     Gastroesophageal reflux disease, unspecified whether esophagitis present  -     pantoprazole (PROTONIX) 40 MG tablet; Take 1 tablet (40 mg total) by mouth once daily.  Stable  Continue medications as prescribed.  Follow up with PCP and gastroenterology    Hearing aid worn    Major depressive disorder, recurrent episode, in full remission  -     QUEtiapine (SEROQUEL) 50 MG tablet; Take 1 tablet (50 mg total) by mouth nightly.  Stable  Continue medications as prescribed.  Follow up with PCP        Follow up in about 1 month (around 2/12/2023), or if symptoms worsen or fail to improve.      Future Appointments       Date Provider Specialty Appt Notes    2/9/2023 Harika Calhoun MD Family Medicine Transfer care--- ref.by dr thomas    4/4/2023  Lab Fasting Labs    4/4/2023  Lab Urine           Last eye exam with Kendra Boyd on 9/2/2022

## 2023-01-12 NOTE — TELEPHONE ENCOUNTER
LVM to reschedule appointment as Provider will be out of clinic this morning. Requested pt or representative return call to discuss medication refills and to reschedule.     Also, sent portal message

## 2023-01-12 NOTE — PROGRESS NOTES
Patient, Agapito Bass (MRN #[id number]), presented with a recent Platelet count less than 150 K/uL consistent with the definition of thrombocytopenia (ICD10 - D69.6).    Platelets   Date Value Ref Range Status   09/30/2022 143 (L) 150 - 450 K/uL Final     The patient's thrombocytopenia was monitored, evaluated, addressed and/or treated. This addendum to the medical record is made on 01/12/2023.

## 2023-01-13 ENCOUNTER — PATIENT MESSAGE (OUTPATIENT)
Dept: FAMILY MEDICINE | Facility: CLINIC | Age: 78
End: 2023-01-13
Payer: MEDICARE

## 2023-01-13 ENCOUNTER — PATIENT OUTREACH (OUTPATIENT)
Dept: ADMINISTRATIVE | Facility: HOSPITAL | Age: 78
End: 2023-01-13
Payer: MEDICARE

## 2023-01-13 NOTE — LETTER
AUTHORIZATION FOR RELEASE OF   CONFIDENTIAL INFORMATION    DR FARFAN    We are seeing Agapito Bass, date of birth 1945, in the clinic at [facility] MEDICINE 1ST FLOOR. Harika Calhoun MD is the patient's PCP. Agapito Bass has an outstanding lab/procedure at the time we reviewed his chart. In order to help keep his health information updated, he has authorized us to request the following medical record(s):        (  )  MAMMOGRAM                                      (  )  COLONOSCOPY      (  )  PAP SMEAR                                          (  )  OUTSIDE LAB RESULTS     (  )  DEXA SCAN                                          (  X)  EYE EXAM            (  )  FOOT EXAM                                          (  )  ENTIRE RECORD     (  )  OUTSIDE IMMUNIZATIONS                 (  )  _______________         Please fax records to [facility], Elizabeth D Cole, MD, [phone]    Thank you in advance,       Jennifer OSUNA  Care Coordinator  [facility]  [address] [place] [id number]  Phone [phone]  Fax [phone]           Patient Name: Agapito Bass  : 1945  Patient Phone #: [phone]

## 2023-01-17 ENCOUNTER — PATIENT MESSAGE (OUTPATIENT)
Dept: FAMILY MEDICINE | Facility: CLINIC | Age: 78
End: 2023-01-17
Payer: MEDICARE

## 2023-01-17 DIAGNOSIS — N18.31 TYPE 2 DIABETES MELLITUS WITH STAGE 3A CHRONIC KIDNEY DISEASE, WITHOUT LONG-TERM CURRENT USE OF INSULIN: ICD-10-CM

## 2023-01-17 DIAGNOSIS — E11.22 TYPE 2 DIABETES MELLITUS WITH STAGE 3A CHRONIC KIDNEY DISEASE, WITHOUT LONG-TERM CURRENT USE OF INSULIN: ICD-10-CM

## 2023-01-23 ENCOUNTER — TELEPHONE (OUTPATIENT)
Dept: FAMILY MEDICINE | Facility: CLINIC | Age: 78
End: 2023-01-23
Payer: MEDICARE

## 2023-01-23 RX ORDER — LANCETS
1 EACH MISCELLANEOUS 3 TIMES DAILY
Qty: 200 EACH | Refills: 3 | Status: SHIPPED | OUTPATIENT
Start: 2023-01-23 | End: 2023-06-19

## 2023-01-23 NOTE — TELEPHONE ENCOUNTER
----- Message from Jacobo Clayton sent at 1/23/2023  2:22 PM CST -----  Regarding: clarification  Contact: rayray keith  Patient want to speak with a nurse regarding clarification on blood strips, please call back at [phone]

## 2023-01-27 NOTE — TELEPHONE ENCOUNTER
Spoke with [employer] regarding clarification of diabetic strips, this has been taken care of and mailed out

## 2023-02-06 ENCOUNTER — PATIENT MESSAGE (OUTPATIENT)
Dept: FAMILY MEDICINE | Facility: CLINIC | Age: 78
End: 2023-02-06
Payer: MEDICARE

## 2023-02-06 NOTE — TELEPHONE ENCOUNTER
Labs from 1/10/23 have been efaxed to [facility].   Fax: [phone]  Phone:[phone]  Address: [address] [place] [address] [place] [id number]

## 2023-02-09 ENCOUNTER — OFFICE VISIT (OUTPATIENT)
Dept: FAMILY MEDICINE | Facility: CLINIC | Age: 78
End: 2023-02-09
Payer: MEDICARE

## 2023-02-09 VITALS
BODY MASS INDEX: 32.86 KG/M2 | DIASTOLIC BLOOD PRESSURE: 80 MMHG | HEIGHT: 63 IN | WEIGHT: 185.44 LBS | HEART RATE: 77 BPM | OXYGEN SATURATION: 98 % | TEMPERATURE: 99 F | SYSTOLIC BLOOD PRESSURE: 120 MMHG

## 2023-02-09 DIAGNOSIS — K62.5 BRBPR (BRIGHT RED BLOOD PER RECTUM): ICD-10-CM

## 2023-02-09 DIAGNOSIS — N40.1 BENIGN PROSTATIC HYPERPLASIA WITH URINARY HESITANCY: ICD-10-CM

## 2023-02-09 DIAGNOSIS — E11.21 MICROALBUMINURIC DIABETIC NEPHROPATHY: ICD-10-CM

## 2023-02-09 DIAGNOSIS — I50.9 CONGESTIVE HEART FAILURE, UNSPECIFIED HF CHRONICITY, UNSPECIFIED HEART FAILURE TYPE: ICD-10-CM

## 2023-02-09 DIAGNOSIS — E11.22 TYPE 2 DIABETES MELLITUS WITH STAGE 3B CHRONIC KIDNEY DISEASE, WITHOUT LONG-TERM CURRENT USE OF INSULIN: ICD-10-CM

## 2023-02-09 DIAGNOSIS — N18.32 TYPE 2 DIABETES MELLITUS WITH STAGE 3B CHRONIC KIDNEY DISEASE, WITHOUT LONG-TERM CURRENT USE OF INSULIN: ICD-10-CM

## 2023-02-09 DIAGNOSIS — K64.9 HEMORRHOIDS, UNSPECIFIED HEMORRHOID TYPE: Primary | ICD-10-CM

## 2023-02-09 DIAGNOSIS — R39.11 BENIGN PROSTATIC HYPERPLASIA WITH URINARY HESITANCY: ICD-10-CM

## 2023-02-09 PROCEDURE — 3074F PR MOST RECENT SYSTOLIC BLOOD PRESSURE < 130 MM HG: ICD-10-PCS | Mod: CPTII,,, | Performed by: FAMILY MEDICINE

## 2023-02-09 PROCEDURE — 1101F PT FALLS ASSESS-DOCD LE1/YR: CPT | Mod: CPTII,,, | Performed by: FAMILY MEDICINE

## 2023-02-09 PROCEDURE — 3079F DIAST BP 80-89 MM HG: CPT | Mod: CPTII,,, | Performed by: FAMILY MEDICINE

## 2023-02-09 PROCEDURE — 1126F PR PAIN SEVERITY QUANTIFIED, NO PAIN PRESENT: ICD-10-PCS | Mod: CPTII,,, | Performed by: FAMILY MEDICINE

## 2023-02-09 PROCEDURE — 1101F PR PT FALLS ASSESS DOC 0-1 FALLS W/OUT INJ PAST YR: ICD-10-PCS | Mod: CPTII,,, | Performed by: FAMILY MEDICINE

## 2023-02-09 PROCEDURE — 1159F MED LIST DOCD IN RCRD: CPT | Mod: CPTII,,, | Performed by: FAMILY MEDICINE

## 2023-02-09 PROCEDURE — 99214 OFFICE O/P EST MOD 30 MIN: CPT | Mod: ,,, | Performed by: FAMILY MEDICINE

## 2023-02-09 PROCEDURE — 3288F PR FALLS RISK ASSESSMENT DOCUMENTED: ICD-10-PCS | Mod: CPTII,,, | Performed by: FAMILY MEDICINE

## 2023-02-09 PROCEDURE — 3288F FALL RISK ASSESSMENT DOCD: CPT | Mod: CPTII,,, | Performed by: FAMILY MEDICINE

## 2023-02-09 PROCEDURE — 3074F SYST BP LT 130 MM HG: CPT | Mod: CPTII,,, | Performed by: FAMILY MEDICINE

## 2023-02-09 PROCEDURE — 1126F AMNT PAIN NOTED NONE PRSNT: CPT | Mod: CPTII,,, | Performed by: FAMILY MEDICINE

## 2023-02-09 PROCEDURE — 99214 PR OFFICE/OUTPT VISIT, EST, LEVL IV, 30-39 MIN: ICD-10-PCS | Mod: ,,, | Performed by: FAMILY MEDICINE

## 2023-02-09 PROCEDURE — 3079F PR MOST RECENT DIASTOLIC BLOOD PRESSURE 80-89 MM HG: ICD-10-PCS | Mod: CPTII,,, | Performed by: FAMILY MEDICINE

## 2023-02-09 PROCEDURE — 1159F PR MEDICATION LIST DOCUMENTED IN MEDICAL RECORD: ICD-10-PCS | Mod: CPTII,,, | Performed by: FAMILY MEDICINE

## 2023-02-09 RX ORDER — PRAMOXINE HYDROCHLORIDE 10 MG/G
AEROSOL, FOAM TOPICAL 3 TIMES DAILY PRN
Qty: 15 G | Refills: 0 | Status: SHIPPED | OUTPATIENT
Start: 2023-02-09

## 2023-02-09 RX ORDER — TAMSULOSIN HYDROCHLORIDE 0.4 MG/1
0.4 CAPSULE ORAL NIGHTLY
Qty: 90 CAPSULE | Refills: 3 | Status: SHIPPED | OUTPATIENT
Start: 2023-02-09 | End: 2024-02-05

## 2023-02-09 NOTE — ASSESSMENT & PLAN NOTE
Continue current care, doing well with HA1C <7%  
Controlled, continue to follow renal function  
Controlled, continues to avoid salt  
Will follow up with Dr Mckeon  
(4) walks frequently

## 2023-02-09 NOTE — PROGRESS NOTES
Subjective:       Patient ID: Agapito Bass is a 77 y.o. male.    Chief Complaint: Establish Care (C/o hemorrhoids wants checked), Hypertension, and Diabetes    Mr Bass is here to establish care. He has had a stroke (2019), and is aphasic, right sided weakness, but still ambulates on his own, without assistance. He also has CHF, A-fib, Type 2 DM, CKD, stage 3, and he has a pacemaker. His cardiaologist is Dr Lane in [place], his endocrinologist is Ann Nguyen, and his GI is Dr Mckeon.    His only complaint today is of a possible internal hemorrhoid. There is no pain, but occasional BRBPR. We will send to Dr Mckeon to check out internal hemorrhoids. He has no other issues going on today.     Hypertension  Pertinent negatives include no palpitations or shortness of breath.   Diabetes  Pertinent negatives for diabetes include no fatigue.   Review of Systems   Constitutional:  Negative for appetite change, fatigue and unexpected weight change.   HENT:  Positive for postnasal drip and rhinorrhea. Negative for congestion and sore throat.    Eyes: Negative.    Respiratory:  Negative for shortness of breath and wheezing.    Cardiovascular:  Negative for palpitations.   Gastrointestinal:  Positive for blood in stool.        Internal hemorrhoids??    GERD, controlled on Protonix   Endocrine:        Diabetes, type 2 well controlled on insulin   Musculoskeletal:  Negative for gait problem.   Skin:         Just had some skin lesions removed from his upper extremities bu Dr Sharma   Allergic/Immunologic: Positive for environmental allergies.   Psychiatric/Behavioral:  Negative for dysphoric mood.      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   HENT:      Head: Normocephalic and atraumatic.      Nose: Nose normal.      Mouth/Throat:      Mouth: Mucous membranes are moist.   Eyes:      Extraocular Movements: Extraocular movements intact.      Pupils: Pupils are equal, round, and reactive to light.   Cardiovascular:      Rate and Rhythm: Normal rate and regular rhythm.   Pulmonary:      Effort: Pulmonary effort is normal. No respiratory distress.      Breath sounds: Normal breath sounds.   Musculoskeletal:      Cervical back: Normal range of motion.      Comments: Ambulating well without assistance   Neurological:      General: No focal deficit present.      Mental Status: He is alert and oriented to person, place, and time.   Psychiatric:         Mood and Affect: Mood normal.         Behavior: Behavior normal.         Thought Content: Thought content normal.         Judgment: Judgment normal.       Lab Results   Component Value Date    WBC 9.69 09/30/2022    HGB 11.8 (L) 09/30/2022    HCT 35.9 (L) 09/30/2022     (L) 09/30/2022    CHOL 103 (L) 04/01/2022    TRIG 77 04/01/2022    HDL 27 (L) 04/01/2022    ALT 26 01/15/2021    AST 26 01/15/2021     01/10/2023    K 3.8 01/10/2023     01/10/2023    CREATININE 1.4 01/10/2023    BUN 27 (H) 01/10/2023    CO2 23 01/10/2023    PSA 1.6 10/05/2021    HGBA1C 6.8 (H) 01/10/2023     The ASCVD  "Risk score (Laura COLLADO, et al., 2019) failed to calculate for the following reasons:    The valid total cholesterol range is 130 to 320 mg/dL  Visit Vitals  /80 (BP Location: Left arm, Patient Position: Sitting, BP Method: Large (Manual))   Pulse 77   Temp 98.5 °F (36.9 °C)   Ht 5' 3" (1.6 m)   Wt 84.1 kg (185 lb 6.5 oz)   SpO2 98%   BMI 32.84 kg/m²      Assessment:       1. Hemorrhoids, unspecified hemorrhoid type    2. Benign prostatic hyperplasia with urinary hesitancy    3. Congestive heart failure, unspecified HF chronicity, unspecified heart failure type    4. Microalbuminuric diabetic nephropathy    5. BRBPR (bright red blood per rectum)    6. Type 2 diabetes mellitus with stage 3b chronic kidney disease, without long-term current use of insulin          Plan:       1. Hemorrhoids, unspecified hemorrhoid type  -     pramoxine 1 % Foam; Place rectally 3 (three) times daily as needed (prn rectal pain/bleeding).  Dispense: 15 g; Refill: 0  -     Cancel: Ambulatory referral/consult to Gastroenterology; Future; Expected date: 02/16/2023  -     Ambulatory referral/consult to Gastroenterology; Future; Expected date: 02/16/2023    2. Benign prostatic hyperplasia with urinary hesitancy  -     tamsulosin (FLOMAX) 0.4 mg Cap; Take 1 capsule (0.4 mg total) by mouth every evening.  Dispense: 90 capsule; Refill: 3    3. Congestive heart failure, unspecified HF chronicity, unspecified heart failure type  Assessment & Plan:  Controlled, continues to avoid salt      4. Microalbuminuric diabetic nephropathy  Assessment & Plan:  Controlled, continue to follow renal function      5. BRBPR (bright red blood per rectum)  Assessment & Plan:  Will follow up with Dr Mckeon      6. Type 2 diabetes mellitus with stage 3b chronic kidney disease, without long-term current use of insulin  Assessment & Plan:  Continue current care, doing well with HA1C <7%         Follow up in about 13 weeks (around 5/11/2023), or if symptoms worsen " or fail to improve.      Future Appointments       Date Specialty Appt Notes    4/4/2023 Lab Fasting Labs    4/4/2023 Lab Urine

## 2023-03-07 NOTE — PROGRESS NOTES
Subjective:       Patient ID: Agapito Bass is a 77 y.o. male.    Chief Complaint: Otalgia (Right side ear pain x 1 week , denies any drainage) and Cough (Prod . With some stuffy and runny nose. No fever , no sore throat. Pt denies any body aches, joint pain, fatigue .)    Patient presents to the clinic with right ear pain and cough.     Symptoms started 1 week ago. Also has complaint of nasal congestion.   Taking Allegra with no relief.     Patient educated on plan of care, verbalized understanding.      Review of Systems   Constitutional: Negative.  Negative for fever.   HENT:  Positive for congestion and ear pain. Negative for postnasal drip, sinus pressure, sneezing and sore throat.    Eyes:  Negative for pain, discharge, redness and itching.   Respiratory:  Positive for cough and shortness of breath. Negative for apnea, chest tightness and wheezing.    Cardiovascular:  Negative for chest pain and leg swelling.   Gastrointestinal:  Negative for abdominal distention, abdominal pain, constipation, diarrhea, nausea and vomiting.   Genitourinary:  Negative for difficulty urinating, dysuria, flank pain and frequency.   Skin:  Negative for color change, rash and wound.   Neurological:  Negative for dizziness.   Psychiatric/Behavioral: Negative.     All other systems reviewed and are negative.    Patient Active Problem List   Diagnosis    CHF (congestive heart failure)    Aphasia    Apraxia of speech    Pulmonary hypertension    Microalbuminuric diabetic nephropathy    Cervical spondylosis    Major depressive disorder, recurrent episode, in full remission    Paroxysmal atrial fibrillation    Hemiparesis affecting right side as late effect of cerebrovascular accident    Diabetic glomerulopathy    Occlusion and stenosis of left middle cerebral artery    Dilatation of thoracic aorta    Gastroesophageal reflux disease    Stage 3a chronic kidney disease    Benign prostatic hyperplasia with urinary hesitancy    Type 2  To patient room for evaluation and management.   More painful contractions. No VB or LOF. Good FM. Able to sleep some overnight. Interested in epidural. 4/50/-2, soft  S/p Cytotec x1. Plan to start pitocin for augmentation.  Cat I tracing. Ctx q2-4 mins.   diabetes mellitus with stage 3b chronic kidney disease, without long-term current use of insulin    Diabetic autonomic neuropathy associated with type 2 diabetes mellitus       Objective:      Physical Exam  Vitals and nursing note reviewed.   Constitutional:       Appearance: Normal appearance. He is not ill-appearing.   HENT:      Head: Normocephalic and atraumatic.      Right Ear: Tenderness present. No drainage or swelling. No middle ear effusion. Tympanic membrane is injected and bulging. Tympanic membrane has decreased mobility.      Left Ear: No tenderness. Tympanic membrane is not injected or bulging.      Nose: Nose normal.   Eyes:      General: Lids are normal.   Cardiovascular:      Rate and Rhythm: Normal rate and regular rhythm.      Pulses: Normal pulses.      Heart sounds: Normal heart sounds.   Pulmonary:      Effort: Pulmonary effort is normal. No tachypnea or respiratory distress.      Breath sounds: Normal breath sounds. No wheezing.   Abdominal:      General: Bowel sounds are normal. There is no distension.      Palpations: Abdomen is soft.      Tenderness: There is no abdominal tenderness.   Musculoskeletal:         General: Normal range of motion.      Cervical back: Full passive range of motion without pain and normal range of motion.      Left lower leg: No edema.   Skin:     General: Skin is warm and dry.   Neurological:      Mental Status: He is alert and oriented to person, place, and time.   Psychiatric:         Mood and Affect: Mood normal.         Behavior: Behavior normal.       Lab Results   Component Value Date    WBC 8.10 04/01/2022    HGB 11.3 (L) 04/01/2022    HCT 36.3 (L) 04/01/2022     04/01/2022    CHOL 103 (L) 04/01/2022    TRIG 77 04/01/2022    HDL 27 (L) 04/01/2022    ALT 26 01/15/2021    AST 26 01/15/2021     10/05/2021    K 4.7 10/05/2021     10/05/2021    CREATININE 1.6 (H) 10/05/2021    BUN 26 (H) 10/05/2021    CO2 25 10/05/2021    PSA 1.6 10/05/2021     HGBA1C 7.6 (H) 07/07/2022     The ASCVD Risk score (Laura DK, et al., 2019) failed to calculate for the following reasons:    The valid total cholesterol range is 130 to 320 mg/dL  Visit Vitals  /64 (BP Location: Right arm, Patient Position: Sitting, BP Method: Medium (Manual))   Pulse 62   Temp 98.1 °F (36.7 °C) (Temporal)   Wt 86.1 kg (189 lb 13.1 oz)   SpO2 96%   BMI 33.62 kg/m²      Assessment:       1. Non-recurrent acute suppurative otitis media of right ear without spontaneous rupture of tympanic membrane        Plan:       1. Non-recurrent acute suppurative otitis media of right ear without spontaneous rupture of tympanic membrane/ Cough  -     amoxicillin-clavulanate 875-125mg (AUGMENTIN) 875-125 mg per tablet        - Continue Allegra        - Flonase Daily    Follow up if symptoms worsen or fail to improve.      Future Appointments       Date Provider Specialty Appt Notes    9/30/2022  Lab lab    10/6/2022 César Mejia MD Family Medicine 3 month f/u

## 2023-03-30 ENCOUNTER — PATIENT MESSAGE (OUTPATIENT)
Dept: FAMILY MEDICINE | Facility: CLINIC | Age: 78
End: 2023-03-30
Payer: MEDICARE

## 2023-03-30 ENCOUNTER — TELEPHONE (OUTPATIENT)
Dept: FAMILY MEDICINE | Facility: CLINIC | Age: 78
End: 2023-03-30
Payer: MEDICARE

## 2023-04-04 ENCOUNTER — LAB VISIT (OUTPATIENT)
Dept: LAB | Facility: CLINIC | Age: 78
End: 2023-04-04
Payer: MEDICARE

## 2023-04-04 DIAGNOSIS — N18.31 TYPE 2 DIABETES MELLITUS WITH STAGE 3A CHRONIC KIDNEY DISEASE, WITHOUT LONG-TERM CURRENT USE OF INSULIN: ICD-10-CM

## 2023-04-04 DIAGNOSIS — E11.22 TYPE 2 DIABETES MELLITUS WITH STAGE 3A CHRONIC KIDNEY DISEASE, WITHOUT LONG-TERM CURRENT USE OF INSULIN: ICD-10-CM

## 2023-04-04 DIAGNOSIS — E78.5 HYPERLIPIDEMIA, UNSPECIFIED HYPERLIPIDEMIA TYPE: ICD-10-CM

## 2023-04-04 LAB
ALBUMIN SERPL BCP-MCNC: 3.7 G/DL (ref 3.5–5.2)
ALP SERPL-CCNC: 64 U/L (ref 55–135)
ALT SERPL W/O P-5'-P-CCNC: 22 U/L (ref 10–44)
ANION GAP SERPL CALC-SCNC: 7 MMOL/L (ref 8–16)
AST SERPL-CCNC: 20 U/L (ref 10–40)
BASOPHILS # BLD AUTO: 0.03 K/UL (ref 0–0.2)
BASOPHILS NFR BLD: 0.4 % (ref 0–1.9)
BILIRUB SERPL-MCNC: 0.6 MG/DL (ref 0.1–1)
BUN SERPL-MCNC: 18 MG/DL (ref 8–23)
CALCIUM SERPL-MCNC: 9.2 MG/DL (ref 8.7–10.5)
CHLORIDE SERPL-SCNC: 107 MMOL/L (ref 95–110)
CHOLEST SERPL-MCNC: 142 MG/DL (ref 120–199)
CHOLEST/HDLC SERPL: 4.6 {RATIO} (ref 2–5)
CO2 SERPL-SCNC: 24 MMOL/L (ref 23–29)
CREAT SERPL-MCNC: 1.3 MG/DL (ref 0.5–1.4)
DIFFERENTIAL METHOD: ABNORMAL
EOSINOPHIL # BLD AUTO: 0.2 K/UL (ref 0–0.5)
EOSINOPHIL NFR BLD: 2.8 % (ref 0–8)
ERYTHROCYTE [DISTWIDTH] IN BLOOD BY AUTOMATED COUNT: 13.6 % (ref 11.5–14.5)
EST. GFR  (NO RACE VARIABLE): 56 ML/MIN/1.73 M^2
ESTIMATED AVG GLUCOSE: 148 MG/DL (ref 68–131)
GLUCOSE SERPL-MCNC: 109 MG/DL (ref 70–110)
HBA1C MFR BLD: 6.8 % (ref 4–5.6)
HCT VFR BLD AUTO: 40.4 % (ref 40–54)
HDLC SERPL-MCNC: 31 MG/DL (ref 40–75)
HDLC SERPL: 21.8 % (ref 20–50)
HGB BLD-MCNC: 12.8 G/DL (ref 14–18)
IMM GRANULOCYTES # BLD AUTO: 0.03 K/UL (ref 0–0.04)
IMM GRANULOCYTES NFR BLD AUTO: 0.4 % (ref 0–0.5)
LDLC SERPL CALC-MCNC: 90.4 MG/DL (ref 63–159)
LYMPHOCYTES # BLD AUTO: 1.2 K/UL (ref 1–4.8)
LYMPHOCYTES NFR BLD: 15.9 % (ref 18–48)
MCH RBC QN AUTO: 29 PG (ref 27–31)
MCHC RBC AUTO-ENTMCNC: 31.7 G/DL (ref 32–36)
MCV RBC AUTO: 92 FL (ref 82–98)
MONOCYTES # BLD AUTO: 0.7 K/UL (ref 0.3–1)
MONOCYTES NFR BLD: 8.4 % (ref 4–15)
NEUTROPHILS # BLD AUTO: 5.6 K/UL (ref 1.8–7.7)
NEUTROPHILS NFR BLD: 72.1 % (ref 38–73)
NONHDLC SERPL-MCNC: 111 MG/DL
NRBC BLD-RTO: 0 /100 WBC
PLATELET # BLD AUTO: 156 K/UL (ref 150–450)
PMV BLD AUTO: 11 FL (ref 9.2–12.9)
POTASSIUM SERPL-SCNC: 3.8 MMOL/L (ref 3.5–5.1)
PROT SERPL-MCNC: 7.2 G/DL (ref 6–8.4)
RBC # BLD AUTO: 4.41 M/UL (ref 4.6–6.2)
SODIUM SERPL-SCNC: 138 MMOL/L (ref 136–145)
T4 FREE SERPL-MCNC: 1 NG/DL (ref 0.71–1.51)
TRIGL SERPL-MCNC: 103 MG/DL (ref 30–150)
TSH SERPL DL<=0.005 MIU/L-ACNC: 3.66 UIU/ML (ref 0.4–4)
WBC # BLD AUTO: 7.74 K/UL (ref 3.9–12.7)

## 2023-04-04 PROCEDURE — 80053 COMPREHEN METABOLIC PANEL: CPT | Performed by: FAMILY MEDICINE

## 2023-04-04 PROCEDURE — 85025 COMPLETE CBC W/AUTO DIFF WBC: CPT | Performed by: FAMILY MEDICINE

## 2023-04-04 PROCEDURE — 84439 ASSAY OF FREE THYROXINE: CPT | Performed by: FAMILY MEDICINE

## 2023-04-04 PROCEDURE — 84443 ASSAY THYROID STIM HORMONE: CPT | Performed by: FAMILY MEDICINE

## 2023-04-04 PROCEDURE — 80061 LIPID PANEL: CPT | Performed by: FAMILY MEDICINE

## 2023-04-04 PROCEDURE — 83036 HEMOGLOBIN GLYCOSYLATED A1C: CPT | Performed by: FAMILY MEDICINE

## 2023-04-05 ENCOUNTER — TELEPHONE (OUTPATIENT)
Dept: FAMILY MEDICINE | Facility: CLINIC | Age: 78
End: 2023-04-05
Payer: MEDICARE

## 2023-04-05 NOTE — TELEPHONE ENCOUNTER
Faxed CMP,A1C and micro/ alb urine results to [facility] endocrinology at [phone]

## 2023-05-11 ENCOUNTER — PATIENT OUTREACH (OUTPATIENT)
Dept: ADMINISTRATIVE | Facility: HOSPITAL | Age: 78
End: 2023-05-11
Payer: MEDICARE

## 2023-05-11 ENCOUNTER — OFFICE VISIT (OUTPATIENT)
Dept: FAMILY MEDICINE | Facility: CLINIC | Age: 78
End: 2023-05-11
Payer: MEDICARE

## 2023-05-11 VITALS
TEMPERATURE: 99 F | HEART RATE: 70 BPM | OXYGEN SATURATION: 97 % | DIASTOLIC BLOOD PRESSURE: 80 MMHG | BODY MASS INDEX: 32.23 KG/M2 | SYSTOLIC BLOOD PRESSURE: 110 MMHG | HEIGHT: 63 IN | WEIGHT: 181.88 LBS

## 2023-05-11 DIAGNOSIS — I50.9 CONGESTIVE HEART FAILURE, UNSPECIFIED HF CHRONICITY, UNSPECIFIED HEART FAILURE TYPE: ICD-10-CM

## 2023-05-11 DIAGNOSIS — R47.01 APHASIA: ICD-10-CM

## 2023-05-11 DIAGNOSIS — S40.812A ABRASION OF LEFT UPPER ARM, INITIAL ENCOUNTER: ICD-10-CM

## 2023-05-11 DIAGNOSIS — Z23 ENCOUNTER FOR IMMUNIZATION: Primary | ICD-10-CM

## 2023-05-11 DIAGNOSIS — E11.21 MICROALBUMINURIC DIABETIC NEPHROPATHY: ICD-10-CM

## 2023-05-11 DIAGNOSIS — N18.32 TYPE 2 DIABETES MELLITUS WITH STAGE 3B CHRONIC KIDNEY DISEASE, WITHOUT LONG-TERM CURRENT USE OF INSULIN: ICD-10-CM

## 2023-05-11 DIAGNOSIS — E11.22 TYPE 2 DIABETES MELLITUS WITH STAGE 3B CHRONIC KIDNEY DISEASE, WITHOUT LONG-TERM CURRENT USE OF INSULIN: ICD-10-CM

## 2023-05-11 PROCEDURE — 3288F FALL RISK ASSESSMENT DOCD: CPT | Mod: CPTII,,, | Performed by: FAMILY MEDICINE

## 2023-05-11 PROCEDURE — 90714 TD VACCINE GREATER THAN OR EQUAL TO 7YO WITH PRESERVATIVE IM: ICD-10-PCS | Mod: ,,, | Performed by: FAMILY MEDICINE

## 2023-05-11 PROCEDURE — 3079F DIAST BP 80-89 MM HG: CPT | Mod: CPTII,,, | Performed by: FAMILY MEDICINE

## 2023-05-11 PROCEDURE — 99214 OFFICE O/P EST MOD 30 MIN: CPT | Mod: 25,,, | Performed by: FAMILY MEDICINE

## 2023-05-11 PROCEDURE — 90714 TD VACC NO PRESV 7 YRS+ IM: CPT | Mod: ,,, | Performed by: FAMILY MEDICINE

## 2023-05-11 PROCEDURE — 99214 PR OFFICE/OUTPT VISIT, EST, LEVL IV, 30-39 MIN: ICD-10-PCS | Mod: 25,,, | Performed by: FAMILY MEDICINE

## 2023-05-11 PROCEDURE — 1101F PR PT FALLS ASSESS DOC 0-1 FALLS W/OUT INJ PAST YR: ICD-10-PCS | Mod: CPTII,,, | Performed by: FAMILY MEDICINE

## 2023-05-11 PROCEDURE — 3074F SYST BP LT 130 MM HG: CPT | Mod: CPTII,,, | Performed by: FAMILY MEDICINE

## 2023-05-11 PROCEDURE — 3288F PR FALLS RISK ASSESSMENT DOCUMENTED: ICD-10-PCS | Mod: CPTII,,, | Performed by: FAMILY MEDICINE

## 2023-05-11 PROCEDURE — 1157F PR ADVANCE CARE PLAN OR EQUIV PRESENT IN MEDICAL RECORD: ICD-10-PCS | Mod: CPTII,,, | Performed by: FAMILY MEDICINE

## 2023-05-11 PROCEDURE — 90471 TD VACCINE GREATER THAN OR EQUAL TO 7YO WITH PRESERVATIVE IM: ICD-10-PCS | Mod: ,,, | Performed by: FAMILY MEDICINE

## 2023-05-11 PROCEDURE — 1126F PR PAIN SEVERITY QUANTIFIED, NO PAIN PRESENT: ICD-10-PCS | Mod: CPTII,,, | Performed by: FAMILY MEDICINE

## 2023-05-11 PROCEDURE — 1126F AMNT PAIN NOTED NONE PRSNT: CPT | Mod: CPTII,,, | Performed by: FAMILY MEDICINE

## 2023-05-11 PROCEDURE — 3079F PR MOST RECENT DIASTOLIC BLOOD PRESSURE 80-89 MM HG: ICD-10-PCS | Mod: CPTII,,, | Performed by: FAMILY MEDICINE

## 2023-05-11 PROCEDURE — 90471 IMMUNIZATION ADMIN: CPT | Mod: ,,, | Performed by: FAMILY MEDICINE

## 2023-05-11 PROCEDURE — 1101F PT FALLS ASSESS-DOCD LE1/YR: CPT | Mod: CPTII,,, | Performed by: FAMILY MEDICINE

## 2023-05-11 PROCEDURE — 3074F PR MOST RECENT SYSTOLIC BLOOD PRESSURE < 130 MM HG: ICD-10-PCS | Mod: CPTII,,, | Performed by: FAMILY MEDICINE

## 2023-05-11 PROCEDURE — 1157F ADVNC CARE PLAN IN RCRD: CPT | Mod: CPTII,,, | Performed by: FAMILY MEDICINE

## 2023-05-11 NOTE — PROGRESS NOTES
Population Health Chart Review & Patient Outreach Details:     Reason for Outreach Encounter:     [x]  Non-Compliant Report   []  Payor Report (Humana, PHN, BCBS, MSSP, MCIP, UHC, etc.)   []  Pre-Visit Chart Review     Updates Requested / Reviewed:     [x]  Care Everywhere    []     []  External Sources ([facility], [facility], DIS, etc.)   [x]  Care Team Updated    Patient Outreach Method:    []  Telephone Outreach Completed   [] Successful   [] Left Voicemail   [] Unable to Contact (wrong number, no voicemail)  []  [facility] Outreach Sent  []  Letter Outreach Mailed  [x]  Fax Sent for External Records  []  External Records Upload    Health Maintenance Topics Addressed and Outreach Outcomes / Actions Taken:        []      Breast Cancer Screening []  Mammo Scheduled      []  External Records Requested     []  Added Reminder to Complete to Upcoming Primary Care Appt Notes     []  Patient Declined     []  Patient Will Call Back to Schedule     []  Patient Will Schedule with External Provider / Order Routed if Applicable             []       Cervical Cancer Screening []  Pap Scheduled      []  External Records Requested     []  Added Reminder to Complete to Upcoming Primary Care Appt Notes     []  Patient Declined     []  Patient Will Call Back to Schedule     []  Patient Will Schedule with External Provider               []          Colorectal Cancer Screening []  Colonoscopy Case Request or Referral Placed     []  External Records Requested     []  Added Reminder to Complete to Upcoming Primary Care Appt Notes     []  Patient Declined     []  Patient Will Call Back to Schedule     []  Patient Will Schedule with External Provider     []  Fit Kit Mailed (add the SmartPhrase under additional notes)     []  Reminded Patient to Complete Home Test             [x]      Diabetic Eye Exam []  Eye Camera Scheduled or Optometry Referral Placed     [x]  External Records Requested - Dr. Boyd     []  Added Reminder  to Complete to Upcoming Primary Care Appt Notes     []  Patient Declined     []  Patient Will Call Back to Schedule     []  Patient Will Schedule with External Provider             []      Blood Pressure Control []  Primary Care Follow Up Visit Scheduled     []  Remote Blood Pressure Reading Captured     []  Added Reminder to Complete to Upcoming Primary Care Appt Notes     []  Patient Declined     []  Patient Will Call Back / Patient Will Send Portal Message with Reading     []  Patient Will Call Back to Schedule Provider Visit             []       HbA1c & Other Labs []  Lab Appt Scheduled for Due Labs     []  Primary Care Follow Up Visit Scheduled      []  Reminded Patient to Complete Home Test     []  Added Reminder to Complete to Upcoming Primary Care Appt Notes     []  Patient Declined     []  Patient Will Call Back to Schedule     []  Patient Will Schedule with External Provider / Order Routed if Applicable           []    Schedule Primary Care Appt []  Primary Care Appt Scheduled     []  Patient Declined     []  Patient Will Call Back to Schedule     []  Pt Established with External Provider & Updated Care Team             []      Medication Adherence []  Primary Care Appointment Scheduled     []  Added Reminder to Upcoming Primary Care Appt Notes     []  Patient Reminded to  Prescription     []  Patient Declined, Provider Notified if Needed     []  Sent Provider Message to Review and/or Add Exclusion to Problem List             []      Osteoporosis Screening []  DXA Appointment Scheduled     []  External Records Requested     []  Added Reminder to Complete to Upcoming Primary Care Appt Notes     []  Patient Declined     []  Patient Will Call Back to Schedule     []  Patient Will Schedule with External Provider / Order Routed if Applicable     Additional Care Coordinator Notes:         Further Action Needed If Patient Returns Outreach:

## 2023-05-11 NOTE — LETTER
AUTHORIZATION FOR RELEASE OF   CONFIDENTIAL INFORMATION    Dear Dr. Boyd,    We are seeing Agapito Bass, date of birth 1945, in the clinic at [facility] MEDIC[facility] 1ST FLOOR. Harika Calhoun MD is the patient's PCP. Agapito Bass has an outstanding lab/procedure at the time we reviewed his chart. In order to help keep his health information updated, he has authorized us to request the following medical record(s):                                             ( X )  EYE EXAM - most recent                 Please fax records to [facility], Elizabeth D Cole, MD, [phone]     If you have any questions, please contact [facility] at [phone].           Patient Name: Agapito Bass  : 1945  Patient Phone #: [phone]

## 2023-05-11 NOTE — PROGRESS NOTES
Subjective:       Patient ID: Agapito Bass is a 77 y.o. male.    Chief Complaint: No chief complaint on file.    Mr. Bass is a 77-year-old male who is here to establish care.  He is not new to [facility] but he is new to me he does request a tetanus vaccine today as he often gets scratches and abrasions to his upper extremities due to working outside.  He has a history of congestive heart failure, diabetes type 2 with peripheral neuropathy, pulmonary hypertension, microalbuminemia diabetic nephropathy, paroxysmal atrial fibrillation, stage 3 chronic kidney disease, BPH, and he wears hearing aids .      He routinely gets eye exams and his last eye exam was 08/2022 with Dr. Aileen Cervantes, he does have 1 scheduled for August of 2023.    He sees an endocrinologist Ann garcia in [place], he he last saw her on 05/09/2022.  He also sees Dr. Adriano Lane, cardiologist, in [facility]  He has a GI physician Dr. Mckeon, in his next colonoscopy is scheduled for 05/30/2023.      His last labs were done on 04/04/2023 and reveal a CBC that was alysha, CMP that was remarkable for a GFR of 56, hemoglobin A1c of 6.8%, TSH of 3.66, and a T4 of 1.0.  He also had a microalbumin creatinine ratio of 52.0.    He is no major complaints today just trying to establish care and wanting his tetanus vaccine.    Review of Systems   Constitutional:  Negative for activity change, appetite change, chills, diaphoresis and fever.   HENT:  Negative for congestion, ear pain, postnasal drip, rhinorrhea and sore throat.    Eyes:  Negative for pain, discharge, redness and itching.   Respiratory:  Negative for cough and shortness of breath.    Cardiovascular:  Negative for chest pain, palpitations and leg swelling.   Gastrointestinal:  Negative for abdominal distention, abdominal pain, constipation, diarrhea and nausea.   Genitourinary:  Negative for difficulty urinating, dysuria, frequency and urgency.   Skin:  Negative for color change, rash  and wound.   Allergic/Immunologic: Negative for environmental allergies.   Neurological:         Aphasia due to CVA in 2019   All other systems reviewed and are negative.    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   HENT:      Head: Normocephalic.      Mouth/Throat:      Mouth: Mucous membranes are moist.   Eyes:      Extraocular Movements: Extraocular movements intact.      Pupils: Pupils are equal, round, and reactive to light.   Cardiovascular:      Rate and Rhythm: Normal rate.      Pulses: Normal pulses.      Heart sounds: Normal heart sounds.   Pulmonary:      Effort: Pulmonary effort is normal.      Breath sounds: Normal breath sounds.   Musculoskeletal:         General: Normal range of motion.   Skin:     General: Skin is warm.   Neurological:      Mental Status: He is alert and oriented to person, place, and time. Mental status is at baseline.      Comments: Apashia    Psychiatric:         Mood and Affect: Mood normal.       Lab Results   Component Value Date    WBC 7.74 04/04/2023    HGB 12.8 (L) 04/04/2023    HCT  "40.4 04/04/2023     04/04/2023    CHOL 142 04/04/2023    TRIG 103 04/04/2023    HDL 31 (L) 04/04/2023    ALT 22 04/04/2023    AST 20 04/04/2023     04/04/2023    K 3.8 04/04/2023     04/04/2023    CREATININE 1.3 04/04/2023    BUN 18 04/04/2023    CO2 24 04/04/2023    TSH 3.660 04/04/2023    PSA 1.6 10/05/2021    HGBA1C 6.8 (H) 04/04/2023     The 10-year ASCVD risk score (Laura COLLADO, et al., 2019) is: 46.9%    Values used to calculate the score:      Age: 77 years      Sex: Male      Is Non- : No      Diabetic: Yes      Tobacco smoker: No      Systolic Blood Pressure: 110 mmHg      Is BP treated: Yes      HDL Cholesterol: 31 mg/dL      Total Cholesterol: 142 mg/dL  Visit Vitals  /80 (BP Location: Left arm, Patient Position: Sitting, BP Method: Large (Manual))   Pulse 70   Temp 98.7 °F (37.1 °C)   Ht 5' 3" (1.6 m)   Wt 82.5 kg (181 lb 14.1 oz)   SpO2 97%   BMI 32.22 kg/m²      Assessment:       1. Encounter for immunization    2. Abrasion of left upper arm, initial encounter    3. Congestive heart failure, unspecified HF chronicity, unspecified heart failure type    4. Aphasia    5. Microalbuminuric diabetic nephropathy    6. Type 2 diabetes mellitus with stage 3b chronic kidney disease, without long-term current use of insulin        Plan:       1. Encounter for immunization  -     (In Office Administered) Td Vaccine    2. Abrasion of left upper arm, initial encounter  -     (In Office Administered) Td Vaccine    3. Congestive heart failure, unspecified HF chronicity, unspecified heart failure type  Assessment & Plan:  Keep cardiology follow ups- stable      4. Aphasia  Assessment & Plan:  Wife helps with his communication      5. Microalbuminuric diabetic nephropathy  Assessment & Plan:  Followed by endocrinology, if worsens will refer to nephrology      6. Type 2 diabetes mellitus with stage 3b chronic kidney disease, without long-term current use of " insulin  Assessment & Plan:  Stable, followed by endocrinology         Follow up in about 6 months (around 11/11/2023), or if symptoms worsen or fail to improve.

## 2023-05-15 PROBLEM — K62.5 BRBPR (BRIGHT RED BLOOD PER RECTUM): Status: RESOLVED | Noted: 2023-02-09 | Resolved: 2023-05-15

## 2023-05-17 ENCOUNTER — PATIENT OUTREACH (OUTPATIENT)
Dept: ADMINISTRATIVE | Facility: HOSPITAL | Age: 78
End: 2023-05-17
Payer: MEDICARE

## 2023-05-17 NOTE — PROGRESS NOTES
Population Health Chart Review & Patient Outreach Details:     Reason for Outreach Encounter:     [x]  Non-Compliant Report   []  Payor Report (Humana, PHN, BCBS, MSSP, MCIP, UHC, etc.)   []  Pre-Visit Chart Review     Updates Requested / Reviewed:     [x]  Care Everywhere    []     []  External Sources ([facility], [facility], DIS, etc.)   []  Care Team Updated    Patient Outreach Method:    []  Telephone Outreach Completed   [] Successful   [] Left Voicemail   [] Unable to Contact (wrong number, no voicemail)  []  [employer] [facility] Outreach Sent  []  Letter Outreach Mailed  []  Fax Sent for External Records  [x]  External Records Upload    Health Maintenance Topics Addressed and Outreach Outcomes / Actions Taken:        []      Breast Cancer Screening []  Mammo Scheduled      []  External Records Requested     []  Added Reminder to Complete to Upcoming Primary Care Appt Notes     []  Patient Declined     []  Patient Will Call Back to Schedule     []  Patient Will Schedule with External Provider / Order Routed if Applicable             []       Cervical Cancer Screening []  Pap Scheduled      []  External Records Requested     []  Added Reminder to Complete to Upcoming Primary Care Appt Notes     []  Patient Declined     []  Patient Will Call Back to Schedule     []  Patient Will Schedule with External Provider               []          Colorectal Cancer Screening []  Colonoscopy Case Request or Referral Placed     []  External Records Requested     []  Added Reminder to Complete to Upcoming Primary Care Appt Notes     []  Patient Declined     []  Patient Will Call Back to Schedule     []  Patient Will Schedule with External Provider     []  Fit Kit Mailed (add the SmartPhrase under additional notes)     []  Reminded Patient to Complete Home Test             [x]      Diabetic Eye Exam []  Eye Camera Scheduled or Optometry Referral Placed     []  External Records Requested     []  Added Reminder to Complete to  Upcoming Primary Care Appt Notes     []  Patient Declined     []  Patient Will Call Back to Schedule     []  Patient Will Schedule with External Provider             []      Blood Pressure Control []  Primary Care Follow Up Visit Scheduled     []  Remote Blood Pressure Reading Captured     []  Added Reminder to Complete to Upcoming Primary Care Appt Notes     []  Patient Declined     []  Patient Will Call Back / Patient Will Send Portal Message with Reading     []  Patient Will Call Back to Schedule Provider Visit             []       HbA1c & Other Labs []  Lab Appt Scheduled for Due Labs     []  Primary Care Follow Up Visit Scheduled      []  Reminded Patient to Complete Home Test     []  Added Reminder to Complete to Upcoming Primary Care Appt Notes     []  Patient Declined     []  Patient Will Call Back to Schedule     []  Patient Will Schedule with External Provider / Order Routed if Applicable           []    Schedule Primary Care Appt []  Primary Care Appt Scheduled     []  Patient Declined     []  Patient Will Call Back to Schedule     []  Pt Established with External Provider & Updated Care Team             []      Medication Adherence []  Primary Care Appointment Scheduled     []  Added Reminder to Upcoming Primary Care Appt Notes     []  Patient Reminded to  Prescription     []  Patient Declined, Provider Notified if Needed     []  Sent Provider Message to Review and/or Add Exclusion to Problem List             []      Osteoporosis Screening []  DXA Appointment Scheduled     []  External Records Requested     []  Added Reminder to Complete to Upcoming Primary Care Appt Notes     []  Patient Declined     []  Patient Will Call Back to Schedule     []  Patient Will Schedule with External Provider / Order Routed if Applicable     Additional Care Coordinator Notes:         Further Action Needed If Patient Returns Outreach:

## 2023-06-19 DIAGNOSIS — E11.22 TYPE 2 DIABETES MELLITUS WITH STAGE 3A CHRONIC KIDNEY DISEASE, WITHOUT LONG-TERM CURRENT USE OF INSULIN: ICD-10-CM

## 2023-06-19 DIAGNOSIS — N18.31 TYPE 2 DIABETES MELLITUS WITH STAGE 3A CHRONIC KIDNEY DISEASE, WITHOUT LONG-TERM CURRENT USE OF INSULIN: ICD-10-CM

## 2023-07-28 ENCOUNTER — LAB VISIT (OUTPATIENT)
Dept: LAB | Facility: CLINIC | Age: 78
End: 2023-07-28
Payer: MEDICARE

## 2023-07-28 ENCOUNTER — OFFICE VISIT (OUTPATIENT)
Dept: FAMILY MEDICINE | Facility: CLINIC | Age: 78
End: 2023-07-28
Payer: MEDICARE

## 2023-07-28 ENCOUNTER — HOSPITAL ENCOUNTER (OUTPATIENT)
Dept: RADIOLOGY | Facility: CLINIC | Age: 78
Discharge: HOME | End: 2023-07-28
Attending: FAMILY MEDICINE
Payer: MEDICARE

## 2023-07-28 ENCOUNTER — PATIENT MESSAGE (OUTPATIENT)
Dept: FAMILY MEDICINE | Facility: CLINIC | Age: 78
End: 2023-07-28

## 2023-07-28 VITALS
TEMPERATURE: 99 F | HEART RATE: 88 BPM | WEIGHT: 184.5 LBS | OXYGEN SATURATION: 97 % | DIASTOLIC BLOOD PRESSURE: 76 MMHG | HEIGHT: 63 IN | SYSTOLIC BLOOD PRESSURE: 110 MMHG | BODY MASS INDEX: 32.69 KG/M2

## 2023-07-28 DIAGNOSIS — I50.9 CONGESTIVE HEART FAILURE, UNSPECIFIED HF CHRONICITY, UNSPECIFIED HEART FAILURE TYPE: ICD-10-CM

## 2023-07-28 DIAGNOSIS — I50.9 CONGESTIVE HEART FAILURE, UNSPECIFIED HF CHRONICITY, UNSPECIFIED HEART FAILURE TYPE: Primary | ICD-10-CM

## 2023-07-28 DIAGNOSIS — R50.9 FEVER, UNSPECIFIED FEVER CAUSE: ICD-10-CM

## 2023-07-28 DIAGNOSIS — R05.1 ACUTE COUGH: ICD-10-CM

## 2023-07-28 DIAGNOSIS — R14.0 BLOATING: ICD-10-CM

## 2023-07-28 DIAGNOSIS — R50.9 FEVER, UNSPECIFIED FEVER CAUSE: Primary | ICD-10-CM

## 2023-07-28 LAB
ADENOVIRUS: NOT DETECTED
BASOPHILS # BLD AUTO: 0.02 K/UL (ref 0–0.2)
BASOPHILS NFR BLD: 0.3 % (ref 0–1.9)
BNP SERPL-MCNC: 600 PG/ML (ref 0–99)
BORDETELLA PARAPERTUSSIS (IS1001): NOT DETECTED
BORDETELLA PERTUSSIS (PTXP): NOT DETECTED
CHLAMYDIA PNEUMONIAE: NOT DETECTED
CORONAVIRUS 229E, COMMON COLD VIRUS: NOT DETECTED
CORONAVIRUS HKU1, COMMON COLD VIRUS: NOT DETECTED
CORONAVIRUS NL63, COMMON COLD VIRUS: NOT DETECTED
CORONAVIRUS OC43, COMMON COLD VIRUS: NOT DETECTED
DIFFERENTIAL METHOD: ABNORMAL
EOSINOPHIL # BLD AUTO: 0 K/UL (ref 0–0.5)
EOSINOPHIL NFR BLD: 0.3 % (ref 0–8)
ERYTHROCYTE [DISTWIDTH] IN BLOOD BY AUTOMATED COUNT: 13.6 % (ref 11.5–14.5)
FLUBV RNA NPH QL NAA+NON-PROBE: NOT DETECTED
HCT VFR BLD AUTO: 40.8 % (ref 40–54)
HGB BLD-MCNC: 13.5 G/DL (ref 14–18)
HPIV1 RNA NPH QL NAA+NON-PROBE: NOT DETECTED
HPIV2 RNA NPH QL NAA+NON-PROBE: NOT DETECTED
HPIV3 RNA NPH QL NAA+NON-PROBE: NOT DETECTED
HPIV4 RNA NPH QL NAA+NON-PROBE: NOT DETECTED
HUMAN METAPNEUMOVIRUS: NOT DETECTED
IMM GRANULOCYTES # BLD AUTO: 0.03 K/UL (ref 0–0.04)
IMM GRANULOCYTES NFR BLD AUTO: 0.4 % (ref 0–0.5)
INFLUENZA A (SUBTYPES H1,H1-2009,H3): NOT DETECTED
LYMPHOCYTES # BLD AUTO: 1.3 K/UL (ref 1–4.8)
LYMPHOCYTES NFR BLD: 17 % (ref 18–48)
MCH RBC QN AUTO: 30.1 PG (ref 27–31)
MCHC RBC AUTO-ENTMCNC: 33.1 G/DL (ref 32–36)
MCV RBC AUTO: 91 FL (ref 82–98)
MONOCYTES # BLD AUTO: 1 K/UL (ref 0.3–1)
MONOCYTES NFR BLD: 12.9 % (ref 4–15)
MYCOPLASMA PNEUMONIAE: NOT DETECTED
NEUTROPHILS # BLD AUTO: 5.4 K/UL (ref 1.8–7.7)
NEUTROPHILS NFR BLD: 69.1 % (ref 38–73)
NRBC BLD-RTO: 0 /100 WBC
PLATELET # BLD AUTO: 135 K/UL (ref 150–450)
PMV BLD AUTO: 10.6 FL (ref 9.2–12.9)
RBC # BLD AUTO: 4.48 M/UL (ref 4.6–6.2)
RESPIRATORY INFECTION PANEL SOURCE: NORMAL
RSV RNA NPH QL NAA+NON-PROBE: NOT DETECTED
RV+EV RNA NPH QL NAA+NON-PROBE: NOT DETECTED
SARS-COV-2 RNA RESP QL NAA+PROBE: NOT DETECTED
WBC # BLD AUTO: 7.77 K/UL (ref 3.9–12.7)

## 2023-07-28 PROCEDURE — 99214 PR OFFICE/OUTPT VISIT, EST, LEVL IV, 30-39 MIN: ICD-10-PCS | Mod: ,,, | Performed by: FAMILY MEDICINE

## 2023-07-28 PROCEDURE — 1157F ADVNC CARE PLAN IN RCRD: CPT | Mod: CPTII,,, | Performed by: FAMILY MEDICINE

## 2023-07-28 PROCEDURE — 1125F PR PAIN SEVERITY QUANTIFIED, PAIN PRESENT: ICD-10-PCS | Mod: CPTII,,, | Performed by: FAMILY MEDICINE

## 2023-07-28 PROCEDURE — 83880 ASSAY OF NATRIURETIC PEPTIDE: CPT | Performed by: FAMILY MEDICINE

## 2023-07-28 PROCEDURE — 3288F FALL RISK ASSESSMENT DOCD: CPT | Mod: CPTII,,, | Performed by: FAMILY MEDICINE

## 2023-07-28 PROCEDURE — 71046 X-RAY EXAM CHEST 2 VIEWS: CPT | Mod: TC,,, | Performed by: RADIOLOGY

## 2023-07-28 PROCEDURE — 71046 XR CHEST PA AND LATERAL: ICD-10-PCS | Mod: 26,,, | Performed by: FAMILY MEDICINE

## 2023-07-28 PROCEDURE — 3078F DIAST BP <80 MM HG: CPT | Mod: CPTII,,, | Performed by: FAMILY MEDICINE

## 2023-07-28 PROCEDURE — 74019 RADEX ABDOMEN 2 VIEWS: CPT | Mod: 26,,, | Performed by: FAMILY MEDICINE

## 2023-07-28 PROCEDURE — 3078F PR MOST RECENT DIASTOLIC BLOOD PRESSURE < 80 MM HG: ICD-10-PCS | Mod: CPTII,,, | Performed by: FAMILY MEDICINE

## 2023-07-28 PROCEDURE — 74019 RADEX ABDOMEN 2 VIEWS: CPT | Mod: TC,,, | Performed by: RADIOLOGY

## 2023-07-28 PROCEDURE — 3288F PR FALLS RISK ASSESSMENT DOCUMENTED: ICD-10-PCS | Mod: CPTII,,, | Performed by: FAMILY MEDICINE

## 2023-07-28 PROCEDURE — 85025 COMPLETE CBC W/AUTO DIFF WBC: CPT | Performed by: FAMILY MEDICINE

## 2023-07-28 PROCEDURE — 71046 X-RAY EXAM CHEST 2 VIEWS: CPT | Mod: 26,,, | Performed by: FAMILY MEDICINE

## 2023-07-28 PROCEDURE — 99214 OFFICE O/P EST MOD 30 MIN: CPT | Mod: ,,, | Performed by: FAMILY MEDICINE

## 2023-07-28 PROCEDURE — 1101F PR PT FALLS ASSESS DOC 0-1 FALLS W/OUT INJ PAST YR: ICD-10-PCS | Mod: CPTII,,, | Performed by: FAMILY MEDICINE

## 2023-07-28 PROCEDURE — 3074F PR MOST RECENT SYSTOLIC BLOOD PRESSURE < 130 MM HG: ICD-10-PCS | Mod: CPTII,,, | Performed by: FAMILY MEDICINE

## 2023-07-28 PROCEDURE — 74019 XR ABDOMEN FLAT AND ERECT: ICD-10-PCS | Mod: TC,,, | Performed by: RADIOLOGY

## 2023-07-28 PROCEDURE — 1157F PR ADVANCE CARE PLAN OR EQUIV PRESENT IN MEDICAL RECORD: ICD-10-PCS | Mod: CPTII,,, | Performed by: FAMILY MEDICINE

## 2023-07-28 PROCEDURE — 87798 DETECT AGENT NOS DNA AMP: CPT | Performed by: FAMILY MEDICINE

## 2023-07-28 PROCEDURE — 1101F PT FALLS ASSESS-DOCD LE1/YR: CPT | Mod: CPTII,,, | Performed by: FAMILY MEDICINE

## 2023-07-28 PROCEDURE — 1125F AMNT PAIN NOTED PAIN PRSNT: CPT | Mod: CPTII,,, | Performed by: FAMILY MEDICINE

## 2023-07-28 PROCEDURE — 74019 XR ABDOMEN FLAT AND ERECT: ICD-10-PCS | Mod: 26,,, | Performed by: FAMILY MEDICINE

## 2023-07-28 PROCEDURE — 71046 XR CHEST PA AND LATERAL: ICD-10-PCS | Mod: TC,,, | Performed by: RADIOLOGY

## 2023-07-28 PROCEDURE — 3074F SYST BP LT 130 MM HG: CPT | Mod: CPTII,,, | Performed by: FAMILY MEDICINE

## 2023-07-28 RX ORDER — HYDROCODONE BITARTRATE AND HOMATROPINE METHYLBROMIDE ORAL SOLUTION 5; 1.5 MG/5ML; MG/5ML
5 LIQUID ORAL EVERY 4 HOURS PRN
Qty: 160 ML | Refills: 0 | Status: SHIPPED | OUTPATIENT
Start: 2023-07-28 | End: 2023-08-08

## 2023-07-28 RX ORDER — PREDNISONE 20 MG/1
20 TABLET ORAL DAILY
Qty: 5 TABLET | Refills: 0 | Status: SHIPPED | OUTPATIENT
Start: 2023-07-28 | End: 2023-07-28

## 2023-07-28 RX ORDER — GUAIFENESIN 1200 MG/1
1 TABLET, EXTENDED RELEASE ORAL 2 TIMES DAILY PRN
Qty: 30 TABLET | Refills: 2 | Status: SHIPPED | OUTPATIENT
Start: 2023-07-28 | End: 2023-08-07

## 2023-07-28 RX ORDER — AMOXICILLIN AND CLAVULANATE POTASSIUM 875; 125 MG/1; MG/1
1 TABLET, FILM COATED ORAL 2 TIMES DAILY
Qty: 20 TABLET | Refills: 0 | Status: SHIPPED | OUTPATIENT
Start: 2023-07-28 | End: 2023-07-28

## 2023-07-28 RX ORDER — PREDNISONE 20 MG/1
20 TABLET ORAL DAILY
Qty: 5 TABLET | Refills: 0 | Status: SHIPPED | OUTPATIENT
Start: 2023-07-28 | End: 2023-08-08

## 2023-07-28 RX ORDER — GUAIFENESIN 1200 MG/1
1 TABLET, EXTENDED RELEASE ORAL 2 TIMES DAILY PRN
Qty: 30 TABLET | Refills: 2 | Status: SHIPPED | OUTPATIENT
Start: 2023-07-28 | End: 2023-07-28

## 2023-07-28 RX ORDER — AMOXICILLIN AND CLAVULANATE POTASSIUM 875; 125 MG/1; MG/1
1 TABLET, FILM COATED ORAL 2 TIMES DAILY
Qty: 20 TABLET | Refills: 0 | Status: SHIPPED | OUTPATIENT
Start: 2023-07-28 | End: 2023-08-08

## 2023-07-28 RX ORDER — HYDROCODONE BITARTRATE AND HOMATROPINE METHYLBROMIDE ORAL SOLUTION 5; 1.5 MG/5ML; MG/5ML
5 LIQUID ORAL EVERY 4 HOURS PRN
Qty: 160 ML | Refills: 0 | Status: SHIPPED | OUTPATIENT
Start: 2023-07-28 | End: 2023-07-28

## 2023-07-28 NOTE — PROGRESS NOTES
Subjective:       Patient ID: Agapito Bass is a 77 y.o. male.    Chief Complaint: Fever and Cough    Mr. Bass is a 77-year-old male who presents today with cough, fever and upper respiratory symptoms.  He has a history of congestive heart failure, aphasia pulmonary hypertension , AFib, type 2 diabetes requiring insulin, stage IIIB renal disease and polyneuropathy.  His last labs were done 04/04/2023.  The results are as follows:    1. CBC normal  2. CMP remarkable for a GFR of 56, otherwise normal   3. Hemoglobin A1c of 6 0.8%    Mr. Bass complains of nausea without vomiting, a productive cough, abdominal bloating but no abdominal pain and he denies constipation, he is short of breath and he does have a history of congestive heart failure    Review of Systems   Constitutional:  Positive for chills and fever. Negative for activity change, appetite change and diaphoresis.   HENT:  Positive for congestion, postnasal drip, rhinorrhea, sinus pressure and sore throat.    Respiratory:  Positive for cough and shortness of breath. Negative for wheezing.    Cardiovascular:  Negative for chest pain, palpitations and leg swelling.   Gastrointestinal:  Positive for abdominal distention and nausea. Negative for abdominal pain and vomiting.   Skin:  Negative for color change, rash and wound.     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   Constitutional:       Appearance: He is well-developed. He is ill-appearing.   HENT:      Right Ear: Tympanic membrane and ear canal normal.      Left Ear: Tympanic membrane and ear canal normal.      Nose: Mucosal edema and rhinorrhea present.      Right Sinus: No maxillary sinus tenderness or frontal sinus tenderness.      Left Sinus: No maxillary sinus tenderness or frontal sinus tenderness.      Mouth/Throat:      Pharynx: Posterior oropharyngeal erythema present. No oropharyngeal exudate.      Tonsils: No tonsillar abscesses.   Cardiovascular:      Rate and Rhythm: Normal rate and regular rhythm.      Heart sounds: Normal heart sounds.   Pulmonary:      Breath sounds: No wheezing or rhonchi.      Comments: Decreased breath sounds throughout  Skin:     General: Skin is warm and dry.   Neurological:      Mental Status: He is alert.       Lab Results   Component Value Date    WBC 7.74 04/04/2023    HGB 12.8 (L) 04/04/2023    HCT 40.4 04/04/2023     04/04/2023    CHOL 142 04/04/2023    TRIG 103 04/04/2023    HDL 31 (L) 04/04/2023    ALT 22 04/04/2023    AST 20 04/04/2023     04/04/2023    K 3.8 04/04/2023     04/04/2023    CREATININE 1.3 04/04/2023    BUN 18 04/04/2023    CO2 24 04/04/2023    TSH 3.660 04/04/2023    PSA 1.6 10/05/2021    HGBA1C 6.8 (H) 04/04/2023     The 10-year ASCVD risk score (Laura COLLADO, et al., 2019) is: 46.9%    Values used to calculate the score:      Age: 77 years      Sex: Male      Is Non- : No      Diabetic: Yes      Tobacco smoker: No      Systolic Blood Pressure: 110 mmHg      Is BP treated: Yes      HDL Cholesterol: 31 mg/dL      Total Cholesterol: 142 mg/dL  Visit Vitals  /76 (BP  "Location: Right arm, Patient Position: Sitting, BP Method: Medium (Manual))   Pulse 88   Temp 99.3 °F (37.4 °C) (Oral)   Ht 5' 3" (1.6 m)   Wt 83.7 kg (184 lb 8.4 oz)   SpO2 97%   BMI 32.69 kg/m²      Assessment:       1. Fever, unspecified fever cause    2. Acute cough    3. Bloating    4. Congestive heart failure, unspecified HF chronicity, unspecified heart failure type        Plan:       1. Fever, unspecified fever cause  -     Respiratory Infection Panel (PCR), Nasopharyngeal; Future; Expected date: 07/28/2023  -     CBC auto differential; Future; Expected date: 07/28/2023  -     Discontinue: amoxicillin-clavulanate 875-125mg (AUGMENTIN) 875-125 mg per tablet; Take 1 tablet by mouth 2 (two) times daily.  Dispense: 20 tablet; Refill: 0  -     amoxicillin-clavulanate 875-125mg (AUGMENTIN) 875-125 mg per tablet; Take 1 tablet by mouth 2 (two) times daily.  Dispense: 20 tablet; Refill: 0    2. Acute cough  -     Respiratory Infection Panel (PCR), Nasopharyngeal; Future; Expected date: 07/28/2023  -     CBC auto differential; Future; Expected date: 07/28/2023  -     X-Ray Chest PA And Lateral; Future; Expected date: 07/28/2023  -     Discontinue: guaiFENesin 1,200 mg Ta12; Take 1 tablet by mouth 2 (two) times daily as needed (Congestion).  Dispense: 30 tablet; Refill: 2  -     Discontinue: amoxicillin-clavulanate 875-125mg (AUGMENTIN) 875-125 mg per tablet; Take 1 tablet by mouth 2 (two) times daily.  Dispense: 20 tablet; Refill: 0  -     Discontinue: predniSONE (DELTASONE) 20 MG tablet; Take 1 tablet (20 mg total) by mouth once daily.  Dispense: 5 tablet; Refill: 0  -     Discontinue: hydrocodone-homatropine 5-1.5 mg/5 ml (HYCODAN) 5-1.5 mg/5 mL Syrp; Take 5 mLs by mouth every 4 (four) hours as needed (Cough).  Dispense: 160 mL; Refill: 0  -     amoxicillin-clavulanate 875-125mg (AUGMENTIN) 875-125 mg per tablet; Take 1 tablet by mouth 2 (two) times daily.  Dispense: 20 tablet; Refill: 0  -     guaiFENesin 1,200 mg " Ta12; Take 1 tablet by mouth 2 (two) times daily as needed (Congestion).  Dispense: 30 tablet; Refill: 2  -     hydrocodone-homatropine 5-1.5 mg/5 ml (HYCODAN) 5-1.5 mg/5 mL Syrp; Take 5 mLs by mouth every 4 (four) hours as needed (Cough).  Dispense: 160 mL; Refill: 0  -     predniSONE (DELTASONE) 20 MG tablet; Take 1 tablet (20 mg total) by mouth once daily.  Dispense: 5 tablet; Refill: 0    3. Bloating  -     X-Ray Abdomen Flat And Erect; Future; Expected date: 07/28/2023    4. Congestive heart failure, unspecified HF chronicity, unspecified heart failure type  -     Brain natriuretic peptide; Future; Expected date: 07/28/2023       Follow up if symptoms worsen or fail to improve.      Future Appointments       Date Provider Specialty Appt Notes    7/28/2023  Radiology     7/28/2023  Radiology     7/28/2023  Lab ,    11/9/2023 Harika Calhoun MD Family Medicine 6 month ck up htn

## 2023-08-02 ENCOUNTER — TELEPHONE (OUTPATIENT)
Dept: FAMILY MEDICINE | Facility: CLINIC | Age: 78
End: 2023-08-02

## 2023-08-02 ENCOUNTER — PATIENT MESSAGE (OUTPATIENT)
Dept: FAMILY MEDICINE | Facility: CLINIC | Age: 78
End: 2023-08-02
Payer: MEDICARE

## 2023-08-08 ENCOUNTER — LAB VISIT (OUTPATIENT)
Dept: LAB | Facility: CLINIC | Age: 78
End: 2023-08-08
Payer: MEDICARE

## 2023-08-08 ENCOUNTER — OFFICE VISIT (OUTPATIENT)
Dept: FAMILY MEDICINE | Facility: CLINIC | Age: 78
End: 2023-08-08
Payer: MEDICARE

## 2023-08-08 VITALS
SYSTOLIC BLOOD PRESSURE: 108 MMHG | TEMPERATURE: 98 F | DIASTOLIC BLOOD PRESSURE: 68 MMHG | HEIGHT: 63 IN | HEART RATE: 79 BPM | BODY MASS INDEX: 32.5 KG/M2 | WEIGHT: 183.44 LBS | OXYGEN SATURATION: 95 %

## 2023-08-08 DIAGNOSIS — I50.9 CONGESTIVE HEART FAILURE, UNSPECIFIED HF CHRONICITY, UNSPECIFIED HEART FAILURE TYPE: ICD-10-CM

## 2023-08-08 DIAGNOSIS — B00.1 COLD SORE: ICD-10-CM

## 2023-08-08 DIAGNOSIS — J40 BRONCHITIS: ICD-10-CM

## 2023-08-08 DIAGNOSIS — K59.00 CONSTIPATION, UNSPECIFIED CONSTIPATION TYPE: Primary | ICD-10-CM

## 2023-08-08 LAB — BNP SERPL-MCNC: 325 PG/ML (ref 0–99)

## 2023-08-08 PROCEDURE — 3288F PR FALLS RISK ASSESSMENT DOCUMENTED: ICD-10-PCS | Mod: CPTII,,, | Performed by: FAMILY MEDICINE

## 2023-08-08 PROCEDURE — 1157F ADVNC CARE PLAN IN RCRD: CPT | Mod: CPTII,,, | Performed by: FAMILY MEDICINE

## 2023-08-08 PROCEDURE — 1157F PR ADVANCE CARE PLAN OR EQUIV PRESENT IN MEDICAL RECORD: ICD-10-PCS | Mod: CPTII,,, | Performed by: FAMILY MEDICINE

## 2023-08-08 PROCEDURE — 3074F SYST BP LT 130 MM HG: CPT | Mod: CPTII,,, | Performed by: FAMILY MEDICINE

## 2023-08-08 PROCEDURE — 99215 PR OFFICE/OUTPT VISIT, EST, LEVL V, 40-54 MIN: ICD-10-PCS | Mod: ,,, | Performed by: FAMILY MEDICINE

## 2023-08-08 PROCEDURE — 3074F PR MOST RECENT SYSTOLIC BLOOD PRESSURE < 130 MM HG: ICD-10-PCS | Mod: CPTII,,, | Performed by: FAMILY MEDICINE

## 2023-08-08 PROCEDURE — 1159F MED LIST DOCD IN RCRD: CPT | Mod: CPTII,,, | Performed by: FAMILY MEDICINE

## 2023-08-08 PROCEDURE — 1101F PT FALLS ASSESS-DOCD LE1/YR: CPT | Mod: CPTII,,, | Performed by: FAMILY MEDICINE

## 2023-08-08 PROCEDURE — 1159F PR MEDICATION LIST DOCUMENTED IN MEDICAL RECORD: ICD-10-PCS | Mod: CPTII,,, | Performed by: FAMILY MEDICINE

## 2023-08-08 PROCEDURE — 99215 OFFICE O/P EST HI 40 MIN: CPT | Mod: ,,, | Performed by: FAMILY MEDICINE

## 2023-08-08 PROCEDURE — 3078F PR MOST RECENT DIASTOLIC BLOOD PRESSURE < 80 MM HG: ICD-10-PCS | Mod: CPTII,,, | Performed by: FAMILY MEDICINE

## 2023-08-08 PROCEDURE — 3078F DIAST BP <80 MM HG: CPT | Mod: CPTII,,, | Performed by: FAMILY MEDICINE

## 2023-08-08 PROCEDURE — 1126F PR PAIN SEVERITY QUANTIFIED, NO PAIN PRESENT: ICD-10-PCS | Mod: CPTII,,, | Performed by: FAMILY MEDICINE

## 2023-08-08 PROCEDURE — 3288F FALL RISK ASSESSMENT DOCD: CPT | Mod: CPTII,,, | Performed by: FAMILY MEDICINE

## 2023-08-08 PROCEDURE — 1126F AMNT PAIN NOTED NONE PRSNT: CPT | Mod: CPTII,,, | Performed by: FAMILY MEDICINE

## 2023-08-08 PROCEDURE — 1101F PR PT FALLS ASSESS DOC 0-1 FALLS W/OUT INJ PAST YR: ICD-10-PCS | Mod: CPTII,,, | Performed by: FAMILY MEDICINE

## 2023-08-08 PROCEDURE — 83880 ASSAY OF NATRIURETIC PEPTIDE: CPT | Performed by: FAMILY MEDICINE

## 2023-08-08 RX ORDER — DOXYCYCLINE 100 MG/1
100 CAPSULE ORAL 2 TIMES DAILY
COMMUNITY
Start: 2023-07-30 | End: 2024-02-05

## 2023-08-08 RX ORDER — DOXYCYCLINE HYCLATE 100 MG
100 TABLET ORAL 2 TIMES DAILY
Qty: 20 TABLET | Refills: 0 | Status: SHIPPED | OUTPATIENT
Start: 2023-08-08 | End: 2024-02-05

## 2023-08-08 RX ORDER — VALACYCLOVIR HYDROCHLORIDE 1 G/1
1000 TABLET, FILM COATED ORAL EVERY 12 HOURS
Qty: 20 TABLET | Refills: 1 | Status: SHIPPED | OUTPATIENT
Start: 2023-08-08 | End: 2024-02-05

## 2023-08-08 RX ORDER — FUROSEMIDE 40 MG/1
40 TABLET ORAL 2 TIMES DAILY
Qty: 60 TABLET | Refills: 0
Start: 2023-08-08

## 2023-08-08 RX ORDER — DOCUSATE SODIUM 100 MG/1
100 CAPSULE, LIQUID FILLED ORAL 3 TIMES DAILY PRN
Qty: 270 CAPSULE | Refills: 1 | Status: SHIPPED | OUTPATIENT
Start: 2023-08-08

## 2023-08-08 RX ORDER — POTASSIUM CHLORIDE 750 MG/1
10 TABLET, EXTENDED RELEASE ORAL
COMMUNITY

## 2023-08-08 NOTE — PROGRESS NOTES
Subjective:       Patient ID: Agapito Bass is a 77 y.o. male.    Chief Complaint: Follow-up (Seen in e.r. last Friday night for sob and fever. Pt states today feeling better. No fever in last 24 hours. Diagnosed bronchitis.), Cough (Pt states cough has stopped and continues on medication given.), and Oral Pain (Feeling raw and sensitive)    Mr. Bass is a 77-year-old male who was seen in clinic on 07/28/2023 for upper respiratory issues.  He is also here for his routine follow-up at that time.  Routine labs were done as well as a BNP, which was at 600.  The patient was called later in the day to see how he was doing he was not doing well so he was taken to the ER.  He went to [email or web address] in [place].  Ms. Bass told the ER staff that he did have an elevated BNP that had been done earlier in the day.   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  He had a chest x-ray as well as a CT abdomen and pelvis which showed no acute cardiopulmonary issues and no acute intra-abdominal issues.  A repeat of the BNP was done and was now at 3661.  He was given a dose of Lasix prior to discharge and he was placed on doxycycline for an upper respiratory tract infection, which is what he was diagnosed with.      Today, he has no significant complaints other than He does have a cold sore on his lower lip.  He is requesting valacyclovir.  We will repeat his BNP he does have a follow up with his cardiologist next week.  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    Dilatation of thoracic aorta    Gastroesophageal reflux disease    Stage 3a chronic kidney disease    Benign prostatic hyperplasia with urinary hesitancy    Type 2 diabetes mellitus with stage 3b chronic kidney disease, without long-term current use of insulin    Diabetic autonomic neuropathy associated with type 2 diabetes mellitus    Hearing aid worn       Objective:      Physical Exam    Lab Results   Component Value Date    WBC 7.77 07/28/2023    HGB 13.5 (L) 07/28/2023    HCT 40.8 07/28/2023     (L) 07/28/2023    CHOL 142 04/04/2023    TRIG 103 04/04/2023    HDL 31 (L) 04/04/2023    ALT 22 04/04/2023    AST 20 04/04/2023     04/04/2023    K 3.8 04/04/2023     04/04/2023    CREATININE 1.3 04/04/2023    BUN 18 04/04/2023    CO2 24 04/04/2023    TSH 3.660 04/04/2023    PSA 1.6 10/05/2021    HGBA1C 6.8 (H) 04/04/2023     The 10-year ASCVD risk score (Laura COLLADO, et al., 2019) is: 45.8%    Values used to calculate the score:      Age: 77 years      Sex: Male      Is Non- : No      Diabetic: Yes      Tobacco smoker: No      Systolic Blood Pressure: 108 mmHg      Is BP treated: Yes      HDL Cholesterol: 31 mg/dL      Total Cholesterol: 142 mg/dL  Visit Vitals  /68 (BP Location: Right arm, Patient Position: Sitting, BP Method: Medium (Manual))   Pulse 79   Temp 98.3 °F (36.8 °C) (Oral)   Ht 5' 3" (1.6 m)   Wt 83.2 kg (183 lb 6.8 oz)   SpO2 95%   BMI 32.49 kg/m²      Assessment:       1. Constipation, unspecified constipation type    2. Congestive heart failure, unspecified HF chronicity, unspecified heart failure type    3. Bronchitis    4. Cold sore        Plan:       1. Constipation, unspecified constipation type  -     docusate sodium (COLACE) 100 MG capsule; Take 1 capsule (100 mg total) by mouth 3 (three) times daily as needed for " Constipation.  Dispense: 270 capsule; Refill: 1    2. Congestive heart failure, unspecified HF chronicity, unspecified heart failure type  -     furosemide (LASIX) 40 MG tablet; Take 1 tablet (40 mg total) by mouth 2 (two) times daily.  Dispense: 60 tablet; Refill: 0  -     Brain natriuretic peptide; Future; Expected date: 08/08/2023    3. Bronchitis  -     doxycycline (VIBRA-TABS) 100 MG tablet; Take 1 tablet (100 mg total) by mouth 2 (two) times daily.  Dispense: 20 tablet; Refill: 0    4. Cold sore  -     valACYclovir (VALTREX) 1000 MG tablet; Take 1 tablet (1,000 mg total) by mouth every 12 (twelve) hours.  Dispense: 20 tablet; Refill: 1       Follow up in about 6 months (around 2/8/2024), or if symptoms worsen or fail to improve.    In excessive of 40 minutes total time spent for evaluation and management services. Time included elements of the following: time spent preparing to see patient, obtaining and reviewing separately obtained history, exam, evaluation, counseling and education of patient/family member or care giver, documenting in the HMR, independently interpreting results and communication of results, coordination of care ordering medications, tests, or procedures, referral and communication to other health care professionals.        Future Appointments       Date Provider Specialty Appt Notes    8/8/2023  Lab [facility]    11/9/2023 Hraika Calhoun MD Family Medicine 6 month ck up htn

## 2023-08-09 NOTE — PROGRESS NOTES
Written by Harika Calhoun MD on 8/8/2023  5:23 PM CDT  Seen by patient Agapito ERYN Bass on 8/8/2023  5:35 PM

## 2023-08-29 ENCOUNTER — LAB VISIT (OUTPATIENT)
Dept: LAB | Facility: CLINIC | Age: 78
End: 2023-08-29
Payer: MEDICARE

## 2023-08-29 DIAGNOSIS — E11.9 TYPE 2 DIABETES MELLITUS WITHOUT COMPLICATIONS: Primary | ICD-10-CM

## 2023-08-29 LAB
ANION GAP SERPL CALC-SCNC: 9 MMOL/L (ref 8–16)
BUN SERPL-MCNC: 15 MG/DL (ref 8–23)
CALCIUM SERPL-MCNC: 9.3 MG/DL (ref 8.7–10.5)
CHLORIDE SERPL-SCNC: 105 MMOL/L (ref 95–110)
CO2 SERPL-SCNC: 26 MMOL/L (ref 23–29)
CREAT SERPL-MCNC: 1.3 MG/DL (ref 0.5–1.4)
EST. GFR  (NO RACE VARIABLE): 56.2 ML/MIN/1.73 M^2
ESTIMATED AVG GLUCOSE: 157 MG/DL (ref 68–131)
GLUCOSE SERPL-MCNC: 99 MG/DL (ref 70–110)
HBA1C MFR BLD: 7.1 % (ref 4–5.6)
POTASSIUM SERPL-SCNC: 4 MMOL/L (ref 3.5–5.1)
SODIUM SERPL-SCNC: 140 MMOL/L (ref 136–145)

## 2023-08-29 PROCEDURE — 83036 HEMOGLOBIN GLYCOSYLATED A1C: CPT | Performed by: NURSE PRACTITIONER

## 2023-08-29 PROCEDURE — 80048 BASIC METABOLIC PNL TOTAL CA: CPT | Performed by: NURSE PRACTITIONER

## 2023-09-14 ENCOUNTER — PATIENT MESSAGE (OUTPATIENT)
Dept: FAMILY MEDICINE | Facility: CLINIC | Age: 78
End: 2023-09-14
Payer: MEDICARE

## 2023-10-16 DIAGNOSIS — N18.31 TYPE 2 DIABETES MELLITUS WITH STAGE 3A CHRONIC KIDNEY DISEASE, WITHOUT LONG-TERM CURRENT USE OF INSULIN: ICD-10-CM

## 2023-10-16 DIAGNOSIS — E11.22 TYPE 2 DIABETES MELLITUS WITH STAGE 3A CHRONIC KIDNEY DISEASE, WITHOUT LONG-TERM CURRENT USE OF INSULIN: ICD-10-CM

## 2023-10-16 NOTE — TELEPHONE ENCOUNTER
LOV: 8/8/23 Lj    NOV:None    Preffered Pharmacy:[facility] - [place], [place] - [address]

## 2023-10-17 RX ORDER — BLOOD SUGAR DIAGNOSTIC
STRIP MISCELLANEOUS 3 TIMES DAILY
Qty: 300 STRIP | Refills: 10 | Status: SHIPPED | OUTPATIENT
Start: 2023-10-17

## 2023-11-17 DIAGNOSIS — E78.5 HYPERLIPIDEMIA, UNSPECIFIED HYPERLIPIDEMIA TYPE: ICD-10-CM

## 2023-11-17 NOTE — TELEPHONE ENCOUNTER
Refill Routing Note   Medication(s) are not appropriate for processing by [facility] for the following reason(s):        Non-participating provider    [facility] action(s):  Route        Medication Therapy Plan: PCP is also a non-participating provider with the refill center      Appointments  past 12m or future 3m with PCP    Date Provider   Last Visit   1/12/2023 Celestina Early NP   Next Visit   Visit date not found Celestina Early NP   ED visits in past 90 days: 0        Note composed:1:19 PM 11/17/2023

## 2023-11-20 RX ORDER — PRAVASTATIN SODIUM 40 MG/1
40 TABLET ORAL
Qty: 90 TABLET | Refills: 3 | Status: SHIPPED | OUTPATIENT
Start: 2023-11-20

## 2023-11-29 DIAGNOSIS — K21.9 GASTROESOPHAGEAL REFLUX DISEASE, UNSPECIFIED WHETHER ESOPHAGITIS PRESENT: ICD-10-CM

## 2023-11-29 NOTE — TELEPHONE ENCOUNTER
Refill Routing Note   Medication(s) are not appropriate for processing by [facility] for the following reason(s):        Non-participating provider    [facility] action(s):  Route               Appointments  past 12m or future 3m with PCP    Date Provider   Last Visit   1/12/2023 Celestina Early, OSWALDO   Next Visit   Visit date not found Celestina Early, NP   ED visits in past 90 days: 0        Note composed:3:44 PM 11/29/2023

## 2023-11-30 RX ORDER — PANTOPRAZOLE SODIUM 40 MG/1
40 TABLET, DELAYED RELEASE ORAL
Qty: 90 TABLET | Refills: 3 | Status: SHIPPED | OUTPATIENT
Start: 2023-11-30

## 2024-02-05 ENCOUNTER — OFFICE VISIT (OUTPATIENT)
Dept: URGENT CARE | Facility: CLINIC | Age: 79
End: 2024-02-05
Payer: MEDICARE

## 2024-02-05 VITALS
BODY MASS INDEX: 32.43 KG/M2 | HEART RATE: 84 BPM | HEIGHT: 63 IN | RESPIRATION RATE: 16 BRPM | OXYGEN SATURATION: 96 % | WEIGHT: 183 LBS | DIASTOLIC BLOOD PRESSURE: 74 MMHG | TEMPERATURE: 98 F | SYSTOLIC BLOOD PRESSURE: 123 MMHG

## 2024-02-05 DIAGNOSIS — B96.89 ACUTE BACTERIAL RHINOSINUSITIS: Primary | ICD-10-CM

## 2024-02-05 DIAGNOSIS — J01.90 ACUTE BACTERIAL RHINOSINUSITIS: Primary | ICD-10-CM

## 2024-02-05 DIAGNOSIS — R05.9 COUGH, UNSPECIFIED TYPE: ICD-10-CM

## 2024-02-05 DIAGNOSIS — J02.9 SORE THROAT: ICD-10-CM

## 2024-02-05 DIAGNOSIS — J40 BRONCHITIS: ICD-10-CM

## 2024-02-05 LAB
CTP QC/QA: YES
FLUAV AG NPH QL: NEGATIVE
FLUBV AG NPH QL: NEGATIVE
S PYO RRNA THROAT QL PROBE: NEGATIVE
SARS-COV-2 AG RESP QL IA.RAPID: NEGATIVE

## 2024-02-05 PROCEDURE — 87811 SARS-COV-2 COVID19 W/OPTIC: CPT | Mod: QW,S$GLB,, | Performed by: NURSE PRACTITIONER

## 2024-02-05 PROCEDURE — 99214 OFFICE O/P EST MOD 30 MIN: CPT | Mod: S$GLB,,, | Performed by: NURSE PRACTITIONER

## 2024-02-05 PROCEDURE — 87804 INFLUENZA ASSAY W/OPTIC: CPT | Mod: QW,,, | Performed by: NURSE PRACTITIONER

## 2024-02-05 PROCEDURE — 87880 STREP A ASSAY W/OPTIC: CPT | Mod: QW,,, | Performed by: NURSE PRACTITIONER

## 2024-02-05 RX ORDER — DOXYCYCLINE 100 MG/1
100 CAPSULE ORAL EVERY 12 HOURS
Qty: 14 CAPSULE | Refills: 0 | Status: SHIPPED | OUTPATIENT
Start: 2024-02-05 | End: 2024-02-12

## 2024-02-05 RX ORDER — BENZONATATE 200 MG/1
200 CAPSULE ORAL EVERY 8 HOURS PRN
Qty: 30 CAPSULE | Refills: 0 | Status: SHIPPED | OUTPATIENT
Start: 2024-02-05

## 2024-02-05 RX ORDER — ALBUTEROL SULFATE 90 UG/1
2 AEROSOL, METERED RESPIRATORY (INHALATION) EVERY 6 HOURS PRN
Qty: 8 G | Refills: 0 | Status: SHIPPED | OUTPATIENT
Start: 2024-02-05

## 2024-02-05 NOTE — PROGRESS NOTES
"Subjective:      Patient ID: Agapito Bass is a 78 y.o. male.    Vitals:  height is 5' 3" (1.6 m) and weight is 83 kg (183 lb). His oral temperature is 98.3 °F (36.8 °C). His blood pressure is 123/74 and his pulse is 84. His respiration is 16 and oxygen saturation is 96%.     Chief Complaint: URI    78-year-old male presents with a reported cough for 3 weeks. He is here with his wife. He also reports sinus congestion, sinus pain, headaches, and sore throat.  He denies any shortness of breath or chest pain. He denies any lower extremity swelling or edema. He has a past medical history of CHF, Afib, Pulmonary HTN, CVA with right-sided hemiparesis with aphasia.     URI   This is a new problem. The current episode started 1 to 4 weeks ago (3 weeks). The problem has been gradually worsening. There has been no fever. Associated symptoms include congestion, headaches, sinus pain and a sore throat. Pertinent negatives include no abdominal pain, chest pain, coughing, diarrhea, dysuria, ear pain, nausea, neck pain, rash or vomiting. Associated symptoms comments: Sore throat x 3 days .       Constitution: Negative for chills, sweating, fatigue, fever and generalized weakness.   HENT:  Positive for congestion, sinus pain and sore throat. Negative for ear pain, trouble swallowing and voice change.    Neck: Negative for neck pain and neck stiffness.   Cardiovascular:  Negative for chest pain, leg swelling, palpitations, sob on exertion and passing out.   Respiratory:  Negative for cough, sputum production and shortness of breath.    Gastrointestinal:  Negative for abdominal pain, nausea, vomiting and diarrhea.   Genitourinary:  Negative for dysuria.   Musculoskeletal:  Negative for pain.   Skin:  Negative for color change and rash.   Neurological:  Positive for headaches. Negative for dizziness.      Objective:     Physical Exam   Constitutional: He is oriented to person, place, and time. He appears well-developed. He is " cooperative.  Non-toxic appearance. He does not appear ill. No distress.   HENT:   Head: Normocephalic and atraumatic.   Ears:   Right Ear: Hearing, tympanic membrane, external ear and ear canal normal.   Left Ear: Hearing, tympanic membrane, external ear and ear canal normal.   Nose: Rhinorrhea and congestion present. No mucosal edema or nasal deformity. No epistaxis. Right sinus exhibits no maxillary sinus tenderness and no frontal sinus tenderness. Left sinus exhibits no maxillary sinus tenderness and no frontal sinus tenderness.   Mouth/Throat: Uvula is midline, oropharynx is clear and moist and mucous membranes are normal. No trismus in the jaw. Normal dentition. No uvula swelling. No oropharyngeal exudate, posterior oropharyngeal edema or posterior oropharyngeal erythema.      Comments: PND noted. Uvula midline  Eyes: Conjunctivae and lids are normal. No scleral icterus.   Neck: Trachea normal and phonation normal. Neck supple. No edema present. No erythema present. No neck rigidity present.   Cardiovascular: Normal rate, regular rhythm, normal heart sounds and normal pulses.   Pulmonary/Chest: Effort normal and breath sounds normal. No stridor. No respiratory distress. He has no decreased breath sounds. He has no wheezes. He has no rhonchi. He has no rales.         Comments: No adventitious lung sounds    Abdominal: Normal appearance.   Musculoskeletal: Normal range of motion.         General: No deformity. Normal range of motion.      Right lower leg: No edema.      Left lower leg: No edema.      Comments: No lower extremity edema noted   Lymphadenopathy:     He has cervical adenopathy.   Neurological: He is alert and oriented to person, place, and time. He exhibits normal muscle tone. Coordination normal.   Skin: Skin is warm, dry, intact, not diaphoretic and not pale.   Psychiatric: His speech is normal and behavior is normal. Judgment and thought content normal.   Nursing note and vitals  reviewed.      Assessment:     1. Acute bacterial rhinosinusitis    2. Sore throat    3. Cough, unspecified type    4. Bronchitis        Plan:     Covid, Strep, Flu all negative today    78-year-old male presents with persistent cough for 3-4 weeks. He has a significant cardiopulmonary history. He has no adventitious lung sounds and no lower extremity edema. CXR obtained today and showed chronic interstitial changes with hyperaeration suggesting COPD. Pulmonary referral placed. Will start him on Doxycyline. Advised for him to see his PCP this week for close interval follow-up. Emergency room precautions for any shortness of breath, chest pain, or any other acutely worsening symptoms or concerns at all.    Acute bacterial rhinosinusitis    Sore throat  -     SARS Coronavirus 2 Antigen, POCT Manual Read  -     POCT rapid strep A  -     POCT Influenza A/B Rapid Antigen    Cough, unspecified type  -     XR CHEST PA AND LATERAL; Future; Expected date: 02/05/2024  -     Ambulatory referral/consult to Pulmonology    Bronchitis  -     Ambulatory referral/consult to Pulmonology    Other orders  -     doxycycline (MONODOX) 100 MG capsule; Take 1 capsule (100 mg total) by mouth every 12 (twelve) hours. for 7 days  Dispense: 14 capsule; Refill: 0  -     benzonatate (TESSALON) 200 MG capsule; Take 1 capsule (200 mg total) by mouth every 8 (eight) hours as needed for Cough.  Dispense: 30 capsule; Refill: 0  -     albuterol (VENTOLIN HFA) 90 mcg/actuation inhaler; Inhale 2 puffs into the lungs every 6 (six) hours as needed for Wheezing or Shortness of Breath. Rescue  Dispense: 8 g; Refill: 0

## 2024-02-05 NOTE — PATIENT INSTRUCTIONS
Take medications as prescribed.  Follow-up with your primary care doctor this week.  Pulmonology referral placed.  Emergency room precautions for any shortness of breath, chest pain, or any other acutely worsening symptoms or concerns at all.

## 2024-06-19 ENCOUNTER — OFFICE VISIT (OUTPATIENT)
Dept: URGENT CARE | Facility: CLINIC | Age: 79
End: 2024-06-19
Payer: MEDICARE

## 2024-06-19 VITALS
TEMPERATURE: 98 F | SYSTOLIC BLOOD PRESSURE: 127 MMHG | OXYGEN SATURATION: 98 % | BODY MASS INDEX: 32.43 KG/M2 | WEIGHT: 183 LBS | HEIGHT: 63 IN | RESPIRATION RATE: 18 BRPM | DIASTOLIC BLOOD PRESSURE: 78 MMHG | HEART RATE: 93 BPM

## 2024-06-19 DIAGNOSIS — L03.221 CELLULITIS OF NECK: Primary | ICD-10-CM

## 2024-06-19 PROCEDURE — 99214 OFFICE O/P EST MOD 30 MIN: CPT | Mod: S$GLB,,, | Performed by: NURSE PRACTITIONER

## 2024-06-19 RX ORDER — DOXYCYCLINE 100 MG/1
100 CAPSULE ORAL 2 TIMES DAILY
Qty: 20 CAPSULE | Refills: 0 | Status: SHIPPED | OUTPATIENT
Start: 2024-06-19 | End: 2024-06-29

## 2024-06-19 NOTE — PROGRESS NOTES
"Subjective:      Patient ID: Agapito Bass is a 78 y.o. male.    Vitals:  height is 5' 3" (1.6 m) and weight is 83 kg (183 lb). His temperature is 98 °F (36.7 °C). His blood pressure is 127/78 and his pulse is 93. His respiration is 18 and oxygen saturation is 98%.     Chief Complaint: Facial Swelling (Behind right ear)    Itching and swelling behind rt ear noticed Sunday. Painful when touching.     ROS   Objective:     Physical Exam    Assessment:     1. Cellulitis of neck        Plan:       Cellulitis of neck  -     doxycycline (VIBRAMYCIN) 100 MG Cap; Take 1 capsule (100 mg total) by mouth 2 (two) times daily. for 10 days  Dispense: 20 capsule; Refill: 0                    "

## 2024-06-19 NOTE — PROGRESS NOTES
CHIEF COMPLAINT  Chief Complaint   Patient presents with    Facial Swelling     Behind right ear       HPI  Agapito Strong a 78 y.o. male with history of aphasia presenting with wife. Wife reports that pt has swelling and redness to right lower ear/upper neck. States he told her about it 4 days ago but he does not remember exact onset. Pt denies fever, sore throat, cough, abdominal pain, n/v/d, or insect stings. Pt has not taken any OTC meds for symptoms.       CURRENT MEDICATIONS  Current Outpatient Medications on File Prior to Visit   Medication Sig Dispense Refill    ACCU-CHEK MATHEW PLUS TEST STRP Strp TEST BLOOD SUGAR THREE TIMES DAILY 300 strip 10    apixaban (ELIQUIS) 5 mg Tab Take 1 tablet (5 mg total) by mouth 2 (two) times daily. 180 tablet 3    ascorbic acid, vitamin C, 500 mg/5 mL Liqd ascorbic acid (vitamin C) 500 mg/5 mL oral liquid   once daily.      blood-glucose meter (ACCU-CHEK MATHEW PLUS METER MISC) Accu-Chek Mathew Plus Meter      cholecalciferol, vitamin D3, 125 mcg (5,000 unit) Tab vitamin d 5000 iu      docusate sodium (COLACE) 100 MG capsule Take 1 capsule (100 mg total) by mouth 3 (three) times daily as needed for Constipation. 270 capsule 1    furosemide (LASIX) 40 MG tablet Take 1 tablet (40 mg total) by mouth 2 (two) times daily. 60 tablet 0    insulin (LANTUS SOLOSTAR U-100 INSULIN) glargine 100 units/mL (3mL) SubQ pen Inject 25 Units into the skin every evening. 24 mL 3    insulin aspart U-100 (NOVOLOG FLEXPEN U-100 INSULIN) 100 unit/mL (3 mL) InPn pen 3 units 3 times a day.  follow sliding scale. 3 mL 0    lancets (ACCU-CHEK SOFTCLIX LANCETS) Misc TEST BLOOD SUGAR THREE TIMES DAILY 300 each 3    metoprolol tartrate (LOPRESSOR) 100 MG tablet Take 1 tablet (100 mg total) by mouth 2 (two) times daily. 180 tablet 3    multivitamin with minerals tablet complete senior vitamins and m      pantoprazole (PROTONIX) 40 MG tablet TAKE 1 TABLET ONE TIME DAILY 90 tablet 3    pen needle, diabetic 32  "gauge x 5/32" Ndle Use daily with lantus injection at night for diabetes with hyperglycemia 90 each 3    potassium chloride SA (K-DUR,KLOR-CON M) 10 MEQ tablet Take 10 mEq by mouth.      pravastatin (PRAVACHOL) 40 MG tablet TAKE 1 TABLET ONE TIME DAILY 90 tablet 3    QUEtiapine (SEROQUEL) 50 MG tablet Take 1 tablet (50 mg total) by mouth nightly. 90 tablet 3    sacubitriL-valsartan (ENTRESTO) 24-26 mg per tablet Take 1 tablet by mouth 2 (two) times daily.      albuterol (VENTOLIN HFA) 90 mcg/actuation inhaler Inhale 2 puffs into the lungs every 6 (six) hours as needed for Wheezing or Shortness of Breath. Rescue (Patient not taking: Reported on 6/19/2024) 8 g 0    benzonatate (TESSALON) 200 MG capsule Take 1 capsule (200 mg total) by mouth every 8 (eight) hours as needed for Cough. (Patient not taking: Reported on 6/19/2024) 30 capsule 0    pramoxine 1 % Foam Place rectally 3 (three) times daily as needed (prn rectal pain/bleeding). (Patient not taking: Reported on 6/19/2024) 15 g 0     No current facility-administered medications on file prior to visit.       ALLERGIES  Review of patient's allergies indicates:   Allergen Reactions    Invokana [canagliflozin]     Trulicity [dulaglutide]        Immunization History   Administered Date(s) Administered    COVID-19, MRNA, LN-S, PF (MODERNA FULL 0.5 ML DOSE) 03/08/2021, 04/05/2021, 11/29/2021    Influenza (FLUAD) - Quadrivalent - Adjuvanted - PF *Preferred* (65+) 10/21/2021, 10/06/2022    Influenza - High Dose - PF (65 years and older) 10/09/2020    Influenza - Quadrivalent 10/04/2019    Influenza - Quadrivalent - PF *Preferred* (6 months and older) 12/03/2018, 10/09/2020    Influenza - Trivalent (ADULT) 09/28/2016, 11/06/2017    PPD Test 06/17/2019    Pneumococcal Conjugate - 13 Valent 03/30/2021    Pneumococcal Polysaccharide - 23 Valent 05/01/2017    Td (ADULT) 05/11/2023       PAST MEDICAL HISTORY  Past Medical History:   Diagnosis Date    CHF (congestive heart " failure) 10/17/2019    Hyperlipemia, mixed     Hypertension     Stroke (cerebrum)        SURGICAL HISTORY  Past Surgical History:   Procedure Laterality Date    ANGIOGRAPHY  10/01/2019    APPENDECTOMY  1952    CARDIAC CATHETERIZATION  07/20/2019    CARDIAC DEFIBRILLATOR PLACEMENT  03/13/2020    COLONOSCOPY  01/01/2015    ECHOCARDIOGRAPHY  07/20/2019    HERNIA REPAIR  2002       SOCIAL HISTORY  Social History     Socioeconomic History    Marital status:    Occupational History    Occupation: retired   Tobacco Use    Smoking status: Never    Smokeless tobacco: Former     Types: Chew     Quit date: 2019   Substance and Sexual Activity    Alcohol use: Not Currently    Drug use: Not Currently    Sexual activity: Not Currently       FAMILY HISTORY  Family History   Problem Relation Name Age of Onset    Lung cancer Mother         REVIEW OF SYSTEMS  Constitutional: No fever, chills, or weakness.  Eyes: No redness, pain, or discharge  HENT: No ear pain, no headache, no rhinorrhea, no throat pain  Respiratory: No cough, wheezing or shortness of breath  Cardiovascular: No chest pain, palpitations or edema  Skin: erythema, edema and tenderness to right upper neck  Neurologic: No focal weakness or sensory changes.  All systems otherwise negative except as noted in the Review of Systems and History of Present Illness      PHYSICAL EXAM  Reviewed Triage Note  VITAL SIGNS: 127/78  Constitutional: Well developed, well nourished, Alert and oriented x3, No acute distress, non-toxic appearance.  HENT: Normocephalic, Atraumatic, left external ear normal, right external ear with slight erythema, edema and tenderness to outer tragus, no mastoid tenderness or erythema, erythema and edema noted to right upper jaw just posterior to ear,  Bilateral ear canals clear, external nose negative, oropharynx moist, No oral exudates.  Eyes: PERRL, EOMI, Conjunctiva normal, No discharge.  Neck: Normal range of motion, no tenderness, supple, no  carotid bruits  Respiratory: Normal breath sounds, no respiratory distress, no wheezing, no rhonchi, no rales  Cardiovascular: HR 93, normal rhythm, no murmurs, no rubs, no gallops.  Integument: Warm, Dry, no rash (see HENT assessment)  Neurologic: Normal motor function, normal sensory function. No focal deficits noted. Intact distal pulses  Psychiatric: Affect normal, judgment normal, mood normal          MEDICAL DECISION MAKING    Physical exam findings discussed with patient and spouse. No acute emergent medical condition identified at this time to warrant further testing. Will dispo home with instructions to follow up with PCP tomorrow. Script for doxycycline sent to pharmacy of choice with instructions on usage. Instructions given to apply warm compresses several times a day with close monitoring and follow up with PCP on Friday. S/s of when to seek emergent care discussed with spouse. Pt and spouse agrees with plan of care.     DISPOSITION  Patient discharged in stable condition       CLINICAL IMPRESSION:  The encounter diagnosis was Cellulitis of neck.    Patient advised to follow-up with your PCP within 3 days for BP re-check if Blood Pressure was >120/80 without history of hypertension.

## 2024-09-09 ENCOUNTER — PATIENT MESSAGE (OUTPATIENT)
Dept: ADMINISTRATIVE | Facility: HOSPITAL | Age: 79
End: 2024-09-09
Payer: MEDICARE

## 2025-02-04 DIAGNOSIS — R47.01 APHASIA DUE TO ACUTE STROKE: Primary | ICD-10-CM

## 2025-02-04 DIAGNOSIS — I63.9 APHASIA DUE TO ACUTE STROKE: Primary | ICD-10-CM

## 2025-02-05 ENCOUNTER — CLINICAL SUPPORT (OUTPATIENT)
Dept: REHABILITATION | Facility: HOSPITAL | Age: 80
End: 2025-02-05
Payer: MEDICARE

## 2025-02-05 DIAGNOSIS — I63.9 APHASIA DUE TO ACUTE STROKE: Primary | ICD-10-CM

## 2025-02-05 DIAGNOSIS — R47.01 APHASIA DUE TO ACUTE STROKE: Primary | ICD-10-CM

## 2025-02-05 PROCEDURE — 92523 SPEECH SOUND LANG COMPREHEN: CPT | Mod: PO

## 2025-02-12 ENCOUNTER — CLINICAL SUPPORT (OUTPATIENT)
Dept: REHABILITATION | Facility: HOSPITAL | Age: 80
End: 2025-02-12
Payer: MEDICARE

## 2025-02-12 DIAGNOSIS — I63.9 APHASIA DUE TO ACUTE STROKE: Primary | ICD-10-CM

## 2025-02-12 DIAGNOSIS — R47.01 APHASIA DUE TO ACUTE STROKE: Primary | ICD-10-CM

## 2025-02-12 PROCEDURE — 92507 TX SP LANG VOICE COMM INDIV: CPT | Mod: PO

## 2025-02-12 NOTE — PROGRESS NOTES
Outpatient Rehab    Speech-Language Pathology Visit    Patient Name: Agapito Bass  MRN: [id number]  YOB: 1945  Today's Date: 2/12/2025    Therapy Diagnosis:   Encounter Diagnosis   Name Primary?    Aphasia due to acute stroke Yes     Physician: Linn Alcocer FNP    Physician Orders: Eval and Treat  Medical Diagnosis: Aphasia due to acute stroke [I63.9, R47.01]      Visit # / Visits Authorized:  1 / 20   Date of Evaluation:  2/5/2025  Insurance Authorization Period: 2/4/2025 to 2/4/2026  Plan of Care Certification:  2/5/2025 to 4/30/2025      Time In: 8:02  Time Out: 8:47  Total Time: 45  Total Billable Time: 45    FOTO:  Due next session    Subjective   Patient reports sleeping well last night and no pain..  Pain reported as 0/10.    Family/caregiver present for this visit:     Objective          Treatment    Short Term Goals: (12 weeks) Current Progress:   Pt will name objects with 90% acc given initial phoneme cue as needed across 3 sessions       Progressing/ Not Met 2/12/2025   - Patient named objects with 65% accuracy given initial phoneme cues and word modeling           2. Pt will provide 3+ words to describe a given picture/object at 80% success given min cues for initiation/word retrieval.        Progressing/ Not Met 2/12/2025   - Not addressed in today's session.      3. Pt will perform variety of verbal closure tasks for phrases and sentences, given initial phoneme cue as needed, at 90% success rate across 3 sessions.       Progressing/ Not Met 2/12/2025   - Patient performed verbal closure tasks for phrases and sentences with 50% accuracy given initial phoneme cues as needed.           4. Pt will perform automatic speech tasks given initial cues with 90% acc      Progressing/ Not Met 2/12/2025   - Patient performed automatic speech tasks with 60% accuracy given initial cues     5. Patient will participate in ongoing assessment of appropriateness of low tech and high tech AAC to  supplement verbal speech and meet communication needs      Progressing/ Not Met 2/12/2025   - Not addressed in today's session.        Assessment & Plan   Assessment  Patient participated well in today's session focused on expressive language. Patient indicated no pain throughout today's session and no cognitive fatigue. Strengths noted in today's session include ability to fill in missing words in familiar rhymes and songs as well and counting from 1-10. Wife reports patient's ability to correctly complete automatic speech tasks when singing. Consistent difficulty with automatic speech tasks and verbal closure tasks. Given initial phoneme cues, the patient's accuracy improved slightly; however, intelligibility remained reduced, and the target word unclear at times. During object naming, patient benefitted from word modeling and initial phoneme cue. Consistent cues to stop, listen, and then repeat. Patient will continue to benefit from skilled Speech Therapy intervention to target deficits noted in initial evaluation. She is progressing well towards her goals. Current goals remain appropriate. Goals to be updated as necessary.  Evaluation/Treatment Tolerance: Patient tolerated treatment well    Patient will continue to benefit from skilled outpatient speech therapy to address the deficits listed in the problem list box on initial evaluation, provide pt/family education and to maximize pt's level of independence in the home and community environment.     Patient's spiritual, cultural, and educational needs considered and patient agreeable to plan of care and goals.      Plan  Continue Plan of Care with focus on rehabilitation and compensation for deficits listed in initial evaluation.        Goals:   Active       Long Term Goals       Long term goal 1 (Progressing)       Start:  02/06/25    Expected End:  04/30/25       Pt will develop functional expressive language skills to communicate wants,needs, and emotions  effectively to variety of communication partners in medical and home environments            Short Term Goals       Short term goal 1 (Progressing)       Start:  02/06/25    Expected End:  04/30/25       Pt will name objects with 90% acc given initial phoneme cue as needed across 3 sessions           Short term goal 2 (Progressing)       Start:  02/06/25    Expected End:  04/30/25       Pt will provide 3+ words to describe a given picture/object at 80% success given min cues for initiation/word retrieval.          Short term goal 3 (Progressing)       Start:  02/06/25    Expected End:  04/30/25       Pt will perform variety of verbal closure tasks for phrases and sentences, given initial phoneme cue as needed, at 90% success rate across 3 sessions.         Short term goal 4 (Progressing)       Start:  02/06/25    Expected End:  04/30/25       Pt will perform automatic speech tasks given initial cues with 90% acc          Short term goal 5 (Progressing)       Start:  02/06/25    Expected End:  04/30/25       Patient will participate in ongoing assessment of appropriateness of low tech and high tech AAC to supplement verbal speech and meet communication needs             MEJIA Braden  02/12/2025

## 2025-02-14 ENCOUNTER — CLINICAL SUPPORT (OUTPATIENT)
Dept: REHABILITATION | Facility: HOSPITAL | Age: 80
End: 2025-02-14
Payer: MEDICARE

## 2025-02-14 DIAGNOSIS — I63.9 APHASIA DUE TO ACUTE STROKE: Primary | ICD-10-CM

## 2025-02-14 DIAGNOSIS — R47.01 APHASIA DUE TO ACUTE STROKE: Primary | ICD-10-CM

## 2025-02-14 PROCEDURE — 92507 TX SP LANG VOICE COMM INDIV: CPT | Mod: PO

## 2025-02-14 NOTE — PROGRESS NOTES
Outpatient Rehab    Speech-Language Pathology Visit    Patient Name: Agapito Bass  MRN: [id number]  YOB: 1945  Today's Date: 2/14/2025    Therapy Diagnosis:   Encounter Diagnosis   Name Primary?    Aphasia due to acute stroke Yes       Physician: Linn Alcocer FNP    Physician Orders: Eval and Treat  Medical Diagnosis: Aphasia due to acute stroke [I63.9, R47.01]      Visit # / Visits Authorized:  2 / 20   Date of Evaluation:  2/5/2025  Insurance Authorization Period: 2/4/2025 to 2/4/2026  Plan of Care Certification:  2/5/2025 to 4/30/2025      Time In: 9:30  Time Out: 10:15  Total Time: 45  Total Billable Time: 45    FOTO:  Due 2/19/25    Subjective   Patient reported no pain and showed up in good spirits. Wife was present during session..      Objective          Treatment    Short Term Goals: (12 weeks) Current Progress:   Pt will name objects with 90% acc given initial phoneme cue as needed across 3 sessions       Progressing/ Not Met 2/12/2025   - Patient named single syllable objects with 80% accuracy given initial phoneme cues and word modeling       - Multi-syllabic object naming is more challenging for the patient; however, the words remain mostly intelligible       2. Pt will provide 3+ words to describe a given picture/object at 80% success given min cues for initiation/word retrieval.        Progressing/ Not Met 2/12/2025   - Not addressed in today's session.      3. Pt will perform variety of verbal closure tasks for phrases and sentences, given initial phoneme cue as needed, at 90% success rate across 3 sessions.       Progressing/ Not Met 2/12/2025   - Not addressed in today's session.          4. Pt will perform automatic speech tasks given initial cues with 90% acc      Progressing/ Not Met 2/12/2025   - Not addressed in today's session.     5. Patient will participate in ongoing assessment of appropriateness of low tech and high tech AAC to supplement verbal speech and meet  communication needs      Progressing/ Not Met 2/12/2025   - Not addressed in today's session.        Assessment & Plan   Assessment  Patient participated well in today's session focused on expressive language. Strengths noted in today's session include improvement in ability to identify common objects. 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 Consistent difficulty with producing /h/, /s/, and /sh/ in initial position of words throughout session, given max verbal cues and modeling. Patient benefits from word modeling and initial phoneme cues. Less cues to stop, listen, and then repeat during today's session. Patient will continue to benefit from skilled Speech Therapy intervention to target deficits noted in initial evaluation. She is progressing well towards her goals. Current goals remain appropriate. Goals to be updated as necessary.      Evaluation/Treatment Tolerance: Patient tolerated treatment well    Patient will continue to benefit from skilled outpatient speech therapy to address the deficits listed in the problem list box on initial evaluation, provide pt/family education and to maximize pt's level of independence in the home and community environment.     Patient's spiritual, cultural, and educational needs considered and patient agreeable to plan of care and goals.      Plan  Continue Plan of Care with focus on rehabilitation and compensation for deficits listed in initial evaluation.        Goals:   Active       Long Term Goals       Long term goal 1 (Progressing)       Start:  02/06/25    Expected End:  04/30/25       Pt will develop functional expressive language skills to communicate wants,needs, and emotions effectively to variety of communication partners in medical and home environments            Short Term Goals       Short term goal 1 (Progressing)       Start:  02/06/25    Expected End:  04/30/25       Pt will name  objects with 90% acc given initial phoneme cue as needed across 3 sessions           Short term goal 2 (Progressing)       Start:  02/06/25    Expected End:  04/30/25       Pt will provide 3+ words to describe a given picture/object at 80% success given min cues for initiation/word retrieval.          Short term goal 3 (Progressing)       Start:  02/06/25    Expected End:  04/30/25       Pt will perform variety of verbal closure tasks for phrases and sentences, given initial phoneme cue as needed, at 90% success rate across 3 sessions.         Short term goal 4 (Progressing)       Start:  02/06/25    Expected End:  04/30/25       Pt will perform automatic speech tasks given initial cues with 90% acc          Short term goal 5 (Progressing)       Start:  02/06/25    Expected End:  04/30/25       Patient will participate in ongoing assessment of appropriateness of low tech and high tech AAC to supplement verbal speech and meet communication needs             MEJIA Braden  02/14/2025

## 2025-02-19 ENCOUNTER — CLINICAL SUPPORT (OUTPATIENT)
Dept: REHABILITATION | Facility: HOSPITAL | Age: 80
End: 2025-02-19
Payer: MEDICARE

## 2025-02-19 DIAGNOSIS — I63.9 APHASIA DUE TO ACUTE STROKE: Primary | ICD-10-CM

## 2025-02-19 DIAGNOSIS — R47.01 APHASIA DUE TO ACUTE STROKE: Primary | ICD-10-CM

## 2025-02-19 PROCEDURE — 92507 TX SP LANG VOICE COMM INDIV: CPT | Mod: PO

## 2025-02-19 NOTE — PROGRESS NOTES
Outpatient Rehab    Speech-Language Pathology Visit    Patient Name: Agapito Bass  MRN: [id number]  YOB: 1945  Today's Date: 2/19/2025    Therapy Diagnosis:   Encounter Diagnosis   Name Primary?    Aphasia due to acute stroke Yes     Physician: Linn Alcocer, BIANCA    Physician Orders: Eval and Treat  Medical Diagnosis: Aphasia due to acute stroke [I63.9, R47.01]      Visit # / Visits Authorized:  3 / 20   Date of Evaluation:  2/5/2025  Insurance Authorization Period: 2/4/2025 to 2/4/2026  Plan of Care Certification:  2/5/2025 to 4/30/2025      Time In: 8:00  Time Out: 8:45  Total Time: 45  Total Billable Time: 45    FOTO:  Due 2/19/25    Subjective      Patient showed up in pleasant spirits and indicated no pain. Spouse present during session.      Objective          Treatment    Short Term Goals: (12 weeks) Current Progress:   Pt will name objects with 90% acc given initial phoneme cue as needed across 3 sessions       Progressing/ Not Met 2/12/2025   - Patient named single syllable objects with 85% accuracy given initial phoneme cues and word modeling. Single syllable words result in more accuracy versus multi-syllabic words.           2. Pt will provide 3+ words to describe a given picture/object at 80% success given min cues for initiation/word retrieval.        Progressing/ Not Met 2/12/2025   - Patient able to consistently provide 2 words to describe a given picture with 80% accuracy given initial phoneme cues      3. Pt will perform variety of verbal closure tasks for phrases and sentences, given initial phoneme cue as needed, at 90% success rate across 3 sessions.       Progressing/ Not Met 2/12/2025   - Limited trials; 85% accuracy for phrases (I.e. familiar songs and rhymes)          4. Pt will perform automatic speech tasks given initial cues with 90% acc      Progressing/ Not Met 2/12/2025   - Patient performed automatic speech tasks with 90% accuracy given initial cues; however,  perseveration noted throughout tasks    - Patient cued to stop, wait, and then follow clinician modeling     5. Patient will participate in ongoing assessment of appropriateness of low tech and high tech AAC to supplement verbal speech and meet communication needs      Progressing/ Not Met 2/12/2025   - Not addressed in today's session.        Assessment & Plan   Assessment  Patient participated well in today's session focused on expressive language. Strengths noted in today's session include improvement in ability to identify single syllable objects. 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 Increased difficulty with producing /h/, /s/, and /sh/ in initial position of words throughout session, given max verbal cues and modeling. Patient benefits from word modeling and initial phoneme cues. Patient benefits from replacing the target word with another word that shares the same initial phoneme until the target word is correctly produced. The initial phoneme is consistently substituted/distorted in most words.Consistent cues to stop, listen, and then repeat during today's session. Patient will continue to benefit from skilled Speech Therapy intervention to target deficits noted in initial evaluation. She is progressing well towards her goals. Current goals remain appropriate. Goals to be updated as necessary.         Patient will continue to benefit from skilled outpatient speech therapy to address the deficits listed in the problem list box on initial evaluation, provide pt/family education and to maximize pt's level of independence in the home and community environment.     Patient's spiritual, cultural, and educational needs considered and patient agreeable to plan of care and goals.      Plan  Continue plan of care as established.     Goals:   Active       Long Term Goals       Long term goal 1 (Progressing)       Start:  02/06/25     Expected End:  04/30/25       Pt will develop functional expressive language skills to communicate wants,needs, and emotions effectively to variety of communication partners in medical and home environments            Short Term Goals       Short term goal 1 (Progressing)       Start:  02/06/25    Expected End:  04/30/25       Pt will name objects with 90% acc given initial phoneme cue as needed across 3 sessions           Short term goal 2 (Progressing)       Start:  02/06/25    Expected End:  04/30/25       Pt will provide 3+ words to describe a given picture/object at 80% success given min cues for initiation/word retrieval.          Short term goal 3 (Progressing)       Start:  02/06/25    Expected End:  04/30/25       Pt will perform variety of verbal closure tasks for phrases and sentences, given initial phoneme cue as needed, at 90% success rate across 3 sessions.         Short term goal 4 (Progressing)       Start:  02/06/25    Expected End:  04/30/25       Pt will perform automatic speech tasks given initial cues with 90% acc          Short term goal 5 (Progressing)       Start:  02/06/25    Expected End:  04/30/25       Patient will participate in ongoing assessment of appropriateness of low tech and high tech AAC to supplement verbal speech and meet communication needs             MEJIA Braden  02/19/2025

## 2025-02-21 ENCOUNTER — CLINICAL SUPPORT (OUTPATIENT)
Dept: REHABILITATION | Facility: HOSPITAL | Age: 80
End: 2025-02-21
Payer: MEDICARE

## 2025-02-21 DIAGNOSIS — I63.9 APHASIA DUE TO ACUTE STROKE: Primary | ICD-10-CM

## 2025-02-21 DIAGNOSIS — R47.01 APHASIA DUE TO ACUTE STROKE: Primary | ICD-10-CM

## 2025-02-21 PROCEDURE — 92507 TX SP LANG VOICE COMM INDIV: CPT | Mod: PO

## 2025-02-21 NOTE — PROGRESS NOTES
Outpatient Rehab    Speech-Language Pathology Visit    Patient Name: Agapito Bass  MRN: [id number]  YOB: 1945  Today's Date: 2/21/2025    Therapy Diagnosis:   Encounter Diagnosis   Name Primary?    Aphasia due to acute stroke Yes       Physician: Linn Alcocer, BIANCA    Physician Orders: Eval and Treat  Medical Diagnosis: Aphasia due to acute stroke [I63.9, R47.01]      Visit # / Visits Authorized:  4 / 20   Date of Evaluation:  2/5/2025  Insurance Authorization Period: 2/4/2025 to 2/4/2026  Plan of Care Certification:  2/5/2025 to 4/30/2025      Time In: 8:00  Time Out: 8:45  Total Time: 45  Total Billable Time: 45    FOTO:  Due     Subjective      Patient reports sleeping well the night before and showed up in pleasant spirits. Spouse present during session.      Objective          Treatment    Short Term Goals: (12 weeks) Current Progress:   Pt will name objects with 90% acc given initial phoneme cue as needed across 3 sessions       Progressing/ Not Met 2/12/2025   - Not addressed in today's session.         2. Pt will provide 3+ words to describe a given picture/object at 80% success given min cues for initiation/word retrieval.        Progressing/ Not Met 2/12/2025   - Not addressed in today's session.     3. Pt will perform variety of verbal closure tasks for phrases and sentences, given initial phoneme cue as needed, at 90% success rate across 3 sessions.       Progressing/ Not Met 2/12/2025   - Not addressed in today's session.            4. Pt will perform automatic speech tasks given initial cues with 90% acc      Progressing/ Not Met 2/12/2025   - Not addressed in today's session.      Met x1     5. Patient will participate in ongoing assessment of appropriateness of low tech and high tech AAC to supplement verbal speech and meet communication needs      Progressing/ Not Met 2/12/2025   - Patient participated in exploration of high tech AAC. Patient demonstrated understanding for use of  AAC to supplement verbal speech and communication needs    - Patient agreed to explore low and high tech AAC in future sessions        Assessment & Plan   Assessment  Patient participated well in today's session focused on exploring high tech AAC. 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 The patient successfully answered simple verbal questions using the clinic device with TouchChat and TD Snap given clinician cues and prompts to appropriate folders. Majority of the session was spent exploring TouchChat, with limited time using TD Snap. However, the patient was able to target words in both programs with clinician cues and prompts. Patient will continue to benefit from skilled Speech Therapy intervention to target deficits noted in initial evaluation. She is progressing well towards her goals. Current goals remain appropriate. Goals to be updated as necessary.         Patient will continue to benefit from skilled outpatient speech therapy to address the deficits listed in the problem list box on initial evaluation, provide pt/family education and to maximize pt's level of independence in the home and community environment.     Patient's spiritual, cultural, and educational needs considered and patient agreeable to plan of care and goals.      Plan  Continue plan of care as established.     Goals:   Active       Long Term Goals       Long term goal 1 (Progressing)       Start:  02/06/25    Expected End:  04/30/25       Pt will develop functional expressive language skills to communicate wants,needs, and emotions effectively to variety of communication partners in medical and home environments            Short Term Goals       Short term goal 1 (Progressing)       Start:  02/06/25    Expected End:  04/30/25       Pt will name objects with 90% acc given initial phoneme cue as needed across 3 sessions           Short term goal 2 (Progressing)        Start:  02/06/25    Expected End:  04/30/25       Pt will provide 3+ words to describe a given picture/object at 80% success given min cues for initiation/word retrieval.          Short term goal 3 (Progressing)       Start:  02/06/25    Expected End:  04/30/25       Pt will perform variety of verbal closure tasks for phrases and sentences, given initial phoneme cue as needed, at 90% success rate across 3 sessions.         Short term goal 4 (Progressing)       Start:  02/06/25    Expected End:  04/30/25       Pt will perform automatic speech tasks given initial cues with 90% acc          Short term goal 5 (Progressing)       Start:  02/06/25    Expected End:  04/30/25       Patient will participate in ongoing assessment of appropriateness of low tech and high tech AAC to supplement verbal speech and meet communication needs             MEJIA Braden  02/21/2025

## 2025-02-25 ENCOUNTER — CLINICAL SUPPORT (OUTPATIENT)
Dept: REHABILITATION | Facility: HOSPITAL | Age: 80
End: 2025-02-25
Payer: MEDICARE

## 2025-02-25 DIAGNOSIS — I63.9 APHASIA DUE TO ACUTE STROKE: Primary | ICD-10-CM

## 2025-02-25 DIAGNOSIS — R47.01 APHASIA DUE TO ACUTE STROKE: Primary | ICD-10-CM

## 2025-02-25 PROCEDURE — 92507 TX SP LANG VOICE COMM INDIV: CPT | Mod: PO

## 2025-02-25 NOTE — PROGRESS NOTES
Outpatient Rehab    Speech-Language Pathology Visit    Patient Name: Agapito Bass  MRN: [id number]  YOB: 1945  Today's Date: 2/25/2025    Therapy Diagnosis:   Encounter Diagnosis   Name Primary?    Aphasia due to acute stroke Yes         Physician: Linn Alcocer, BIANCA    Physician Orders: Eval and Treat  Medical Diagnosis: Aphasia due to acute stroke [I63.9, R47.01]      Visit # / Visits Authorized:  5 / 20   Date of Evaluation:  2/5/2025  Insurance Authorization Period: 2/4/2025 to 2/4/2026  Plan of Care Certification:  2/5/2025 to 4/30/2025      Time In: 9:30  Time Out: 10:15  Total Time: 45  Total Billable Time: 45    FOTO:  Due     Subjective      Patient reports no pain and showed up in pleasant spirits. Wife attended session.       Objective          Treatment    Short Term Goals: (12 weeks) Current Progress:   Pt will name objects with 90% acc given initial phoneme cue as needed across 3 sessions       Progressing/ Not Met 2/12/2025   - Patient named objects with 90% accuracy given initial phoneme cues as needed.    Met x1       2. Pt will provide 3+ words to describe a given picture/object at 80% success given min cues for initiation/word retrieval.        Progressing/ Not Met 2/12/2025   - Not addressed in today's session.     3. Pt will perform variety of verbal closure tasks for phrases and sentences, given initial phoneme cue as needed, at 90% success rate across 3 sessions.       Progressing/ Not Met 2/12/2025   - Patient able to complete verbal closure tasks for phrases and sentences with 90% accuracy given minimal cues     Met x1        4. Pt will perform automatic speech tasks given initial cues with 90% acc      Progressing/ Not Met 2/12/2025   - Patient performed automatic speech tasks given initial cues with 90% accuracy      Met x2     5. Patient will participate in ongoing assessment of appropriateness of low tech and high tech AAC to supplement verbal speech and meet  communication needs      Progressing/ Not Met 2/12/2025   - Not addressed in today's session.          Assessment & Plan   Assessment  Patient participated well in today's session focused on verbal expression. 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 Patient's strengths in today's session include his ability to break down multi-syllabic words independently after clinician model. Increase in accuracy for object naming; however, initial phoneme is still an area of difficulty. Patient benefits from using different words with same initial phoneme to find target word. Patient will continue to benefit from skilled Speech Therapy intervention to target deficits noted in initial evaluation. She is progressing well towards her goals. Current goals remain appropriate. Goals to be updated as necessary.         Patient will continue to benefit from skilled outpatient speech therapy to address the deficits listed in the problem list box on initial evaluation, provide pt/family education and to maximize pt's level of independence in the home and community environment.     Patient's spiritual, cultural, and educational needs considered and patient agreeable to plan of care and goals.      Plan  Continue plan of care as established.     Goals:   Active       Long Term Goals       Long term goal 1 (Progressing)       Start:  02/06/25    Expected End:  04/30/25       Pt will develop functional expressive language skills to communicate wants,needs, and emotions effectively to variety of communication partners in medical and home environments            Short Term Goals       Short term goal 1 (Progressing)       Start:  02/06/25    Expected End:  04/30/25       Pt will name objects with 90% acc given initial phoneme cue as needed across 3 sessions           Short term goal 2 (Progressing)       Start:  02/06/25    Expected End:  04/30/25       Pt will provide  3+ words to describe a given picture/object at 80% success given min cues for initiation/word retrieval.          Short term goal 3 (Progressing)       Start:  02/06/25    Expected End:  04/30/25       Pt will perform variety of verbal closure tasks for phrases and sentences, given initial phoneme cue as needed, at 90% success rate across 3 sessions.         Short term goal 4 (Progressing)       Start:  02/06/25    Expected End:  04/30/25       Pt will perform automatic speech tasks given initial cues with 90% acc          Short term goal 5 (Progressing)       Start:  02/06/25    Expected End:  04/30/25       Patient will participate in ongoing assessment of appropriateness of low tech and high tech AAC to supplement verbal speech and meet communication needs             MEJIA Braden  02/25/2025

## 2025-02-28 ENCOUNTER — CLINICAL SUPPORT (OUTPATIENT)
Dept: REHABILITATION | Facility: HOSPITAL | Age: 80
End: 2025-02-28
Payer: MEDICARE

## 2025-02-28 DIAGNOSIS — I63.9 APHASIA DUE TO ACUTE STROKE: Primary | ICD-10-CM

## 2025-02-28 DIAGNOSIS — R47.01 APHASIA DUE TO ACUTE STROKE: Primary | ICD-10-CM

## 2025-02-28 PROCEDURE — 92507 TX SP LANG VOICE COMM INDIV: CPT | Mod: PO

## 2025-02-28 NOTE — PROGRESS NOTES
Outpatient Rehab    Speech-Language Pathology Visit    Patient Name: Agapito Bass  MRN: [id number]  YOB: 1945  Today's Date: 2/28/2025    Therapy Diagnosis:   Encounter Diagnosis   Name Primary?    Aphasia due to acute stroke Yes           Physician: Linn Alcocer, BIANCA    Physician Orders: Eval and Treat  Medical Diagnosis: Aphasia due to acute stroke [I63.9, R47.01]      Visit # / Visits Authorized:  5 / 20   Date of Evaluation:  2/5/2025  Insurance Authorization Period: 2/4/2025 to 2/4/2026  Plan of Care Certification:  2/5/2025 to 4/30/2025      Time In: 9:30  Time Out: 10:15  Total Time: 45  Total Billable Time: 45    FOTO:  Due     Subjective      Patient reports sleeping well and showed up in pleasant spirits. Wife attended session.       Objective          Treatment    Short Term Goals: (12 weeks) Current Progress:   Pt will name objects with 90% acc given initial phoneme cue as needed across 3 sessions       Progressing/ Not Met 2/12/2025   - Not addressed in today's session.      Met x1       2. Pt will provide 3+ words to describe a given picture/object at 80% success given min cues for initiation/word retrieval.        Progressing/ Not Met 2/12/2025   - Not addressed in today's session.     3. Pt will perform variety of verbal closure tasks for phrases and sentences, given initial phoneme cue as needed, at 90% success rate across 3 sessions.       Progressing/ Not Met 2/12/2025   - Patient able to complete verbal closure tasks for phrases and sentences with 80% accuracy given moderate cues     Met x1        4. Pt will perform automatic speech tasks given initial cues with 90% acc      Progressing/ Not Met 2/12/2025   - Not addressed in today's session.        Met x2     5. Patient will participate in ongoing assessment of appropriateness of low tech and high tech AAC to supplement verbal speech and meet communication needs      Progressing/ Not Met 2/12/2025   -Patient participated in  exploration of high tech AAC this date. (TD Snap)    -Patient able to navigate to 1 folder and find correct target word to answer questions with minimal cues    -Patient able to navigate to keyboard and type target word when unable to say word with moderate cues and prompts.        Assessment & Plan   Assessment  Patient participated well in today's session focused on verbal expression and high tech AAC. Patient able to navigate TD Snap software/program with minimal-moderate cues and prompts. Verbal closure tasks for sentences were difficult for patient this date. More success with verbal closure tasks using phrases.  Patient benefits from using different words with same initial phoneme to find target word. Patient will continue to benefit from skilled Speech Therapy intervention to target deficits noted in initial evaluation. She is progressing well towards her goals. Current goals remain appropriate. Goals to be updated as necessary.         Patient will continue to benefit from skilled outpatient speech therapy to address the deficits listed in the problem list box on initial evaluation, provide pt/family education and to maximize pt's level of independence in the home and community environment.     Patient's spiritual, cultural, and educational needs considered and patient agreeable to plan of care and goals.      Plan  Continue plan of care as established.     Goals:   Active       Long Term Goals       Long term goal 1 (Progressing)       Start:  02/06/25    Expected End:  04/30/25       Pt will develop functional expressive language skills to communicate wants,needs, and emotions effectively to variety of communication partners in medical and home environments            Short Term Goals       Short term goal 1 (Progressing)       Start:  02/06/25    Expected End:  04/30/25       Pt will name objects with 90% acc given initial phoneme cue as needed across 3 sessions           Short term goal 2 (Progressing)        Start:  02/06/25    Expected End:  04/30/25       Pt will provide 3+ words to describe a given picture/object at 80% success given min cues for initiation/word retrieval.          Short term goal 3 (Progressing)       Start:  02/06/25    Expected End:  04/30/25       Pt will perform variety of verbal closure tasks for phrases and sentences, given initial phoneme cue as needed, at 90% success rate across 3 sessions.         Short term goal 4 (Progressing)       Start:  02/06/25    Expected End:  04/30/25       Pt will perform automatic speech tasks given initial cues with 90% acc          Short term goal 5 (Progressing)       Start:  02/06/25    Expected End:  04/30/25       Patient will participate in ongoing assessment of appropriateness of low tech and high tech AAC to supplement verbal speech and meet communication needs             MEJIA Braden  02/28/2025

## 2025-03-02 ENCOUNTER — OFFICE VISIT (OUTPATIENT)
Dept: URGENT CARE | Facility: CLINIC | Age: 80
End: 2025-03-02
Payer: MEDICARE

## 2025-03-02 VITALS
HEART RATE: 78 BPM | TEMPERATURE: 98 F | BODY MASS INDEX: 32.43 KG/M2 | SYSTOLIC BLOOD PRESSURE: 150 MMHG | OXYGEN SATURATION: 95 % | HEIGHT: 63 IN | WEIGHT: 183 LBS | DIASTOLIC BLOOD PRESSURE: 80 MMHG | RESPIRATION RATE: 18 BRPM

## 2025-03-02 DIAGNOSIS — T14.8XXA SKIN ABRASION: Primary | ICD-10-CM

## 2025-03-02 PROCEDURE — 99213 OFFICE O/P EST LOW 20 MIN: CPT | Mod: S$GLB,,, | Performed by: NURSE PRACTITIONER

## 2025-03-02 RX ORDER — ROSUVASTATIN CALCIUM 20 MG/1
20 TABLET, COATED ORAL
COMMUNITY
Start: 2024-12-25

## 2025-03-02 RX ORDER — DOXYCYCLINE 100 MG/1
100 CAPSULE ORAL EVERY 12 HOURS
Qty: 20 CAPSULE | Refills: 0 | Status: SHIPPED | OUTPATIENT
Start: 2025-03-02 | End: 2025-03-12

## 2025-03-02 NOTE — PROGRESS NOTES
"Subjective:      Patient ID: Agapito Bass is a 79 y.o. male.    Vitals:  height is 5' 3" (1.6 m) and weight is 83 kg (183 lb). His oral temperature is 98.3 °F (36.8 °C). His blood pressure is 150/80 (abnormal) and his pulse is 78. His respiration is 18 and oxygen saturation is 95%.     Chief Complaint: Wound Check    79-year-old afebrile male who presents today with chief complaint of very small abrasion to the right lower leg for 1 week.  He explains he does not remember what he struck his leg on.    Leg Pain   The incident occurred more than 1 week ago. The incident occurred at home. The injury mechanism was a direct blow. The pain is present in the right leg. The pain is at a severity of 0/10. The patient is experiencing no pain. He reports no foreign bodies present. Nothing aggravates the symptoms. Treatments tried: dial soap/ peroxide/ black francoise.       Skin:  Positive for abrasion. Negative for erythema.      Objective:     Physical Exam   Constitutional: He is oriented to person, place, and time.  Non-toxic appearance. He does not appear ill. No distress. obesity  HENT:   Head: Normocephalic and atraumatic.   Nose: Nose normal.   Mouth/Throat: Mucous membranes are moist. Oropharynx is clear.   Eyes: Conjunctivae are normal. Extraocular movement intact   Neck: Neck supple. No neck rigidity present.   Cardiovascular: Normal rate, regular rhythm, normal heart sounds and normal pulses.   Pulmonary/Chest: Effort normal and breath sounds normal.   Abdominal: Normal appearance.   Musculoskeletal: Normal range of motion.         General: Normal range of motion.      Cervical back: He exhibits no tenderness.   Lymphadenopathy:     He has no cervical adenopathy.   Neurological: He is alert and oriented to person, place, and time.   Skin: Skin is warm, dry, not diaphoretic, not pale and no rash. No bruising and No erythema         Comments: There is a very small approximately 0.5 cm in diameter abrasion to the " anterior right lower leg.  There is some very mild erythema localized to this area.  Otherwise, normal skin exam. no jaundice  Psychiatric: His behavior is normal.   Vitals reviewed.      Assessment:     1. Skin abrasion        Plan:       Skin abrasion  -     doxycycline (VIBRAMYCIN) 100 MG Cap; Take 1 capsule (100 mg total) by mouth every 12 (twelve) hours. for 10 days  Dispense: 20 capsule; Refill: 0      INSTRUCTIONS  Medication as prescribed.  Wound care as advised.  Follow up with your doctor for re-evaluation as instructed.  To ER for worsening of symptoms, or for any new symptoms as discussed.

## 2025-03-05 ENCOUNTER — CLINICAL SUPPORT (OUTPATIENT)
Dept: REHABILITATION | Facility: HOSPITAL | Age: 80
End: 2025-03-05
Payer: MEDICARE

## 2025-03-05 DIAGNOSIS — I63.9 APHASIA DUE TO ACUTE STROKE: Primary | ICD-10-CM

## 2025-03-05 DIAGNOSIS — R47.01 APHASIA DUE TO ACUTE STROKE: Primary | ICD-10-CM

## 2025-03-05 PROCEDURE — 92507 TX SP LANG VOICE COMM INDIV: CPT | Mod: PO

## 2025-03-05 NOTE — PROGRESS NOTES
Outpatient Rehab    Speech-Language Pathology Visit    Patient Name: Agapito Bass  MRN: [id number]  YOB: 1945  Today's Date: 3/5/2025    Therapy Diagnosis:   Encounter Diagnosis   Name Primary?    Aphasia due to acute stroke Yes       Physician: Linn Alcocer, BIANCA    Physician Orders: Eval and Treat  Medical Diagnosis: Aphasia due to acute stroke [I63.9, R47.01]      Visit # / Visits Authorized:  6 / 20   Date of Evaluation:  2/5/2025  Insurance Authorization Period: 2/4/2025 to 2/4/2026  Plan of Care Certification:  2/5/2025 to 4/30/2025      Time In: 8:00  Time Out: 8:45  Total Time: 45  Total Billable Time: 45    Subjective      Patient reports not sleeping well; however, showed up in pleasant spirits.        Objective          Treatment    Short Term Goals: (12 weeks) Current Progress:   Pt will name objects with 90% acc given initial phoneme cue as needed across 3 sessions       Progressing/ Not Met 2/12/2025   - 85% accuracy given initial phoneme cues as needed    -Patient requires initial phoneme cues majority of time due to apraxia which causes him to replace or omit initial phoneme      Met x1       2. Pt will provide 3+ words to describe a given picture/object at 80% success given min cues for initiation/word retrieval.        Progressing/ Not Met 2/12/2025   - Patient able to provide 3-4 words to describe pictures on [employer] serina with minimal cues and 80% accuracy    - Patient's main mode of word finding is to draw target word in the air using his finger     Met x1   3. Pt will perform variety of verbal closure tasks for phrases and sentences, given initial phoneme cue as needed, at 90% success rate across 3 sessions.       Progressing/ Not Met 2/12/2025   - Patient able to complete verbal closure tasks for phrases and sentences with 85% accuracy given minimal cues     Met x1        4. Pt will perform automatic speech tasks given initial cues with 90% acc      Progressing/ Not Met  2/12/2025   - Patient performed automatic speech tasks given initial phoneme cues with 85% accuracy.        Met x2     5. Patient will participate in ongoing assessment of appropriateness of low tech and high tech AAC to supplement verbal speech and meet communication needs      Progressing/ Not Met 2/12/2025   -Not addressed in today's session.          Assessment & Plan   Assessment  Patient participated well in today's session focused on verbal expression. Patient able to identify objects given initial phoneme cues as needed. Initial phoneme cues required for majority of task due to apraxia of speech, which causes patient to omit or substitute initial phonemes. Strengths in today's session include patient's ability to finger spell words in the air when unable to verbalize target word while describing pictures. Patient has more success with verbal closure tasks using phrases. Additionally, patient does well with common scriptures and songs for completing verbal closure tasks. Patient benefits from using different words with same initial phoneme to find target word. Patient will continue to benefit from skilled Speech Therapy intervention to target deficits noted in initial evaluation. She is progressing well towards her goals. Current goals remain appropriate. Goals to be updated as necessary.         Patient will continue to benefit from skilled outpatient speech therapy to address the deficits listed in the problem list box on initial evaluation, provide pt/family education and to maximize pt's level of independence in the home and community environment.     Patient's spiritual, cultural, and educational needs considered and patient agreeable to plan of care and goals.      Plan  Continue plan of care as established.     Goals:   Active       Long Term Goals       Long term goal 1 (Progressing)       Start:  02/06/25    Expected End:  04/30/25       Pt will develop functional expressive language skills to  communicate wants,needs, and emotions effectively to variety of communication partners in medical and home environments            Short Term Goals       Short term goal 1 (Progressing)       Start:  02/06/25    Expected End:  04/30/25       Pt will name objects with 90% acc given initial phoneme cue as needed across 3 sessions           Short term goal 2 (Progressing)       Start:  02/06/25    Expected End:  04/30/25       Pt will provide 3+ words to describe a given picture/object at 80% success given min cues for initiation/word retrieval.          Short term goal 3 (Progressing)       Start:  02/06/25    Expected End:  04/30/25       Pt will perform variety of verbal closure tasks for phrases and sentences, given initial phoneme cue as needed, at 90% success rate across 3 sessions.         Short term goal 4 (Progressing)       Start:  02/06/25    Expected End:  04/30/25       Pt will perform automatic speech tasks given initial cues with 90% acc          Short term goal 5 (Progressing)       Start:  02/06/25    Expected End:  04/30/25       Patient will participate in ongoing assessment of appropriateness of low tech and high tech AAC to supplement verbal speech and meet communication needs             MEJIA Braden  03/05/2025

## 2025-03-07 ENCOUNTER — CLINICAL SUPPORT (OUTPATIENT)
Dept: REHABILITATION | Facility: HOSPITAL | Age: 80
End: 2025-03-07
Payer: MEDICARE

## 2025-03-07 DIAGNOSIS — R47.01 APHASIA DUE TO ACUTE STROKE: Primary | ICD-10-CM

## 2025-03-07 DIAGNOSIS — I63.9 APHASIA DUE TO ACUTE STROKE: Primary | ICD-10-CM

## 2025-03-07 PROCEDURE — 92507 TX SP LANG VOICE COMM INDIV: CPT | Mod: PO

## 2025-03-07 NOTE — PROGRESS NOTES
Outpatient Rehab    Speech-Language Pathology Visit    Patient Name: Agapito Bass  MRN: [id number]  YOB: 1945  Today's Date: 3/7/2025    Therapy Diagnosis:   Encounter Diagnosis   Name Primary?    Aphasia due to acute stroke Yes         Physician: Linn Alcocer, BIANCA    Physician Orders: Eval and Treat  Medical Diagnosis: Aphasia due to acute stroke [I63.9, R47.01]      Visit # / Visits Authorized:  8 / 20   Date of Evaluation:  2/5/2025  Insurance Authorization Period: 2/4/2025 to 2/4/2026  Plan of Care Certification:  2/5/2025 to 4/30/2025      Time In: 8:45  Time Out: 9:30  Total Time: 45  Total Billable Time: 45    Subjective      Patient reports no pain and showed up in pleasant spirits.        Objective          Treatment    Short Term Goals: (12 weeks) Current Progress:   Pt will name objects with 90% acc given initial phoneme cue as needed across 3 sessions       Progressing/ Not Met 2/12/2025   - 90% accuracy given initial phoneme cues as needed; intelligibility depends on familiarity of communication partner      Met x2       2. Pt will provide 3+ words to describe a given picture/object at 80% success given min cues for initiation/word retrieval.        Progressing/ Not Met 2/12/2025   - Patient able to provide 3-4 words to describe pictures on [employer] serina with minimal cues and 80% accuracy    Met x2     3. Pt will perform variety of verbal closure tasks for phrases and sentences, given initial phoneme cue as needed, at 90% success rate across 3 sessions.       Progressing/ Not Met 2/12/2025   - Patient able to complete verbal closure tasks for phrases and sentences with 85% accuracy given minimal cues     Met x1        4. Pt will perform automatic speech tasks given initial cues with 90% acc      Progressing/ Not Met 2/12/2025   - Patient performed automatic speech tasks given initial phoneme cues with 85% accuracy.        Met x2     5. Patient will participate in ongoing  assessment of appropriateness of low tech and high tech AAC to supplement verbal speech and meet communication needs      Progressing/ Not Met 2/12/2025   -Patient participated in exploring of high tech AAC in today's session via TD Snap and Grid           Assessment & Plan   Assessment  Patient participated well in today's session focused on verbal expression and exploration of high-tech AAC. Patient able to identify objects given initial phoneme cues as needed. More difficulty this date with verbal closure tasks. Explored TD Snap and Grid this date. Patient's preferred method of communication is finger-spelling in the air. Patient benefits from using different words with same initial phoneme to find target word. Patient will continue to benefit from skilled Speech Therapy intervention to target deficits noted in initial evaluation. She is progressing well towards her goals. Current goals remain appropriate. Goals to be updated as necessary.         Patient will continue to benefit from skilled outpatient speech therapy to address the deficits listed in the problem list box on initial evaluation, provide pt/family education and to maximize pt's level of independence in the home and community environment.     Patient's spiritual, cultural, and educational needs considered and patient agreeable to plan of care and goals.      Plan  Continue plan of care as established.     Goals:   Active       Long Term Goals       Long term goal 1 (Progressing)       Start:  02/06/25    Expected End:  04/30/25       Pt will develop functional expressive language skills to communicate wants,needs, and emotions effectively to variety of communication partners in medical and home environments            Short Term Goals       Short term goal 1 (Progressing)       Start:  02/06/25    Expected End:  04/30/25       Pt will name objects with 90% acc given initial phoneme cue as needed across 3 sessions           Short term goal 2  (Progressing)       Start:  02/06/25    Expected End:  04/30/25       Pt will provide 3+ words to describe a given picture/object at 80% success given min cues for initiation/word retrieval.          Short term goal 3 (Progressing)       Start:  02/06/25    Expected End:  04/30/25       Pt will perform variety of verbal closure tasks for phrases and sentences, given initial phoneme cue as needed, at 90% success rate across 3 sessions.         Short term goal 4 (Progressing)       Start:  02/06/25    Expected End:  04/30/25       Pt will perform automatic speech tasks given initial cues with 90% acc          Short term goal 5 (Progressing)       Start:  02/06/25    Expected End:  04/30/25       Patient will participate in ongoing assessment of appropriateness of low tech and high tech AAC to supplement verbal speech and meet communication needs             MEJIA Braden  03/07/2025

## 2025-03-12 ENCOUNTER — CLINICAL SUPPORT (OUTPATIENT)
Dept: REHABILITATION | Facility: HOSPITAL | Age: 80
End: 2025-03-12
Payer: MEDICARE

## 2025-03-12 DIAGNOSIS — I63.9 APHASIA DUE TO ACUTE STROKE: Primary | ICD-10-CM

## 2025-03-12 DIAGNOSIS — R47.01 APHASIA DUE TO ACUTE STROKE: Primary | ICD-10-CM

## 2025-03-12 PROCEDURE — 92507 TX SP LANG VOICE COMM INDIV: CPT | Mod: PO

## 2025-03-12 NOTE — PROGRESS NOTES
Outpatient Rehab    Speech-Language Pathology Visit    Patient Name: Agapito Bass  MRN: [id number]  YOB: 1945  Today's Date: 3/12/2025    Therapy Diagnosis:   Encounter Diagnosis   Name Primary?    Aphasia due to acute stroke Yes       Physician: Linn Alcocer, BIANCA    Physician Orders: Eval and Treat  Medical Diagnosis: Aphasia due to acute stroke [I63.9, R47.01]      Visit # / Visits Authorized:  9 / 20   Date of Evaluation:  2/5/2025  Insurance Authorization Period: 2/4/2025 to 2/4/2026  Plan of Care Certification:  2/5/2025 to 4/30/2025      Time In: 8:37  Time Out: 9:22  Total Time: 45  Total Billable Time: 45    Subjective      Patient reports not sleeping well; however, showed up in pleasant spirits.        Objective          Treatment    Short Term Goals: (12 weeks) Current Progress:   Pt will name objects with 90% acc given initial phoneme cue as needed across 3 sessions       Progressing/ Not Met 2/12/2025   - 80% accuracy given initial phoneme cues as needed; intelligibility depends on familiarity of communication partner    -Patient required occasional carrier phrase for target words this date      Met x2       2. Pt will provide 3+ words to describe a given picture/object at 80% success given min cues for initiation/word retrieval.        Progressing/ Not Met 2/12/2025   - Not addressed in today's session.      Met x2     3. Pt will perform variety of verbal closure tasks for phrases and sentences, given initial phoneme cue as needed, at 90% success rate across 3 sessions.       Progressing/ Not Met 2/12/2025   - Patient able to complete verbal closure tasks for phrases and sentences with 85% accuracy given minimal cues and initial phoneme cues    Met x1        4. Pt will perform automatic speech tasks given initial cues with 90% acc      Progressing/ Not Met 2/12/2025   - Patient performed automatic speech tasks given initial phoneme cues with 85% accuracy; patient extreme difficulty  with multi-syllabic words this date due to AOS      Met x2     5. Patient will participate in ongoing assessment of appropriateness of low tech and high tech AAC to supplement verbal speech and meet communication needs      Progressing/ Not Met 2/12/2025   -Patient participated in exploring of high tech AAC in today's session via [employer]    - Patient prefers to write in the air with his finger when communication breakdowns occur          Assessment & Plan   Assessment  Patient participated well in today's session focused on verbal expression and exploration of high-tech AAC. Patient able to identify objects given initial phoneme cues as needed. Consistent difficulty this date with verbal closure tasks compared to previous session. Patient is able to complete automatic speech tasks; however, perseverates on previous task words and requires visual aid as well as initial phoneme for correct completion. Explored TD Snap this date while answering personal questions. Patient's preferred method of communication is finger-spelling in the air when he cannot find target word. Patient benefits from using different words with same initial phoneme to find target word. Patient will continue to benefit from skilled Speech Therapy intervention to target deficits noted in initial evaluation. She is progressing well towards her goals. Current goals remain appropriate. Goals to be updated as necessary.         Patient will continue to benefit from skilled outpatient speech therapy to address the deficits listed in the problem list box on initial evaluation, provide pt/family education and to maximize pt's level of independence in the home and community environment.     Patient's spiritual, cultural, and educational needs considered and patient agreeable to plan of care and goals.      Plan  Continue plan of care as established.     Goals:   Active       Long Term Goals       Long term goal 1 (Progressing)       Start:  02/06/25     Expected End:  04/30/25       Pt will develop functional expressive language skills to communicate wants,needs, and emotions effectively to variety of communication partners in medical and home environments            Short Term Goals       Short term goal 1 (Progressing)       Start:  02/06/25    Expected End:  04/30/25       Pt will name objects with 90% acc given initial phoneme cue as needed across 3 sessions           Short term goal 2 (Progressing)       Start:  02/06/25    Expected End:  04/30/25       Pt will provide 3+ words to describe a given picture/object at 80% success given min cues for initiation/word retrieval.          Short term goal 3 (Progressing)       Start:  02/06/25    Expected End:  04/30/25       Pt will perform variety of verbal closure tasks for phrases and sentences, given initial phoneme cue as needed, at 90% success rate across 3 sessions.         Short term goal 4 (Progressing)       Start:  02/06/25    Expected End:  04/30/25       Pt will perform automatic speech tasks given initial cues with 90% acc          Short term goal 5 (Progressing)       Start:  02/06/25    Expected End:  04/30/25       Patient will participate in ongoing assessment of appropriateness of low tech and high tech AAC to supplement verbal speech and meet communication needs             MEJIA Braden  03/12/2025

## 2025-03-14 ENCOUNTER — CLINICAL SUPPORT (OUTPATIENT)
Dept: REHABILITATION | Facility: HOSPITAL | Age: 80
End: 2025-03-14
Payer: MEDICARE

## 2025-03-14 DIAGNOSIS — R47.01 APHASIA DUE TO ACUTE STROKE: Primary | ICD-10-CM

## 2025-03-14 DIAGNOSIS — I63.9 APHASIA DUE TO ACUTE STROKE: Primary | ICD-10-CM

## 2025-03-14 PROCEDURE — 92507 TX SP LANG VOICE COMM INDIV: CPT | Mod: PO

## 2025-03-14 NOTE — PROGRESS NOTES
Outpatient Rehab    Speech-Language Pathology Visit    Patient Name: Agapito Bass  MRN: [id number]  YOB: 1945  Today's Date: 3/14/2025    Therapy Diagnosis:   Encounter Diagnosis   Name Primary?    Aphasia due to acute stroke Yes     Physician: Linn Alcocer, BIANCA    Physician Orders: Eval and Treat  Medical Diagnosis: Aphasia due to acute stroke [I63.9, R47.01]      Visit # / Visits Authorized:  10 / 20   Date of Evaluation:  2/5/2025  Insurance Authorization Period: 2/4/2025 to 2/4/2026  Plan of Care Certification:  2/5/2025 to 4/30/2025      Time In: 8:46  Time Out: 9:30  Total Time: 44  Total Billable Time: 44    Subjective      Patient reports and showed up in pleasant spirits. No pain reported.     Objective          Treatment    Short Term Goals: (12 weeks) Current Progress:   Pt will name objects with 90% acc given initial phoneme cue as needed across 3 sessions       Progressing/ Not Met 2/12/2025   - 85% accuracy given initial phoneme cues as needed; intelligibility depends on familiarity of communication partner    -Patient required occasional carrier phrase for target words this date      Met x2       2. Pt will provide 3+ words to describe a given picture/object at 80% success given min cues for initiation/word retrieval.        Progressing/ Not Met 2/12/2025   - Not addressed in today's session.      Met x2     3. Pt will perform variety of verbal closure tasks for phrases and sentences, given initial phoneme cue as needed, at 90% success rate across 3 sessions.       Progressing/ Not Met 2/12/2025   - Patient able to complete verbal closure tasks for phrases and sentences with 85% accuracy given initial phoneme cues    Met x1        4. Pt will perform automatic speech tasks given initial cues with 90% acc      Progressing/ Not Met 2/12/2025   - Patient performed automatic speech tasks given initial phoneme cues with 85% accuracy    -Patient used AAC device to select target word when  communication breakdowns occurred with max prompts from therapist      Met x2     5. Patient will participate in ongoing assessment of appropriateness of low tech and high tech AAC to supplement verbal speech and meet communication needs      Progressing/ Not Met 2/12/2025   -Patient participated in exploring of high tech AAC in today's session via [id number]     -Patient reports using AAC programs with pictures helps           Assessment & Plan   Assessment  Patient participated well in today's session focused on verbal expression and exploration of high-tech AAC. Patient able to identify objects given initial phoneme cues as needed. Less difficulty this date with verbal closure tasks compared to previous session. Patient is able to complete automatic speech tasks given initial phoneme cues. Utilized AAC software while completing automatic speech tasks this date to reduce communication breakdowns. 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 Explored [id number] this date while answering questions. Patient verbalized that he is fine with all AAC and has no preference for any software program. Patient's preferred method of communication is finger-spelling in the air when he cannot find target word. Patient benefits from using different words with same initial phoneme to find target word. Patient will continue to benefit from skilled Speech Therapy intervention to target deficits noted in initial evaluation. She is progressing well towards her goals. Current goals remain appropriate. Goals to be updated as necessary.         Patient will continue to benefit from skilled outpatient speech therapy to address the deficits listed in the problem list box on initial evaluation, provide pt/family education and to maximize pt's level of independence in the home and community environment.     Patient's spiritual, cultural, and educational needs considered  and patient agreeable to plan of care and goals.      Plan  Continue plan of care as established.     Goals:   Active       Long Term Goals       Long term goal 1 (Progressing)       Start:  02/06/25    Expected End:  04/30/25       Pt will develop functional expressive language skills to communicate wants,needs, and emotions effectively to variety of communication partners in medical and home environments            Short Term Goals       Short term goal 1 (Progressing)       Start:  02/06/25    Expected End:  04/30/25       Pt will name objects with 90% acc given initial phoneme cue as needed across 3 sessions           Short term goal 2 (Progressing)       Start:  02/06/25    Expected End:  04/30/25       Pt will provide 3+ words to describe a given picture/object at 80% success given min cues for initiation/word retrieval.          Short term goal 3 (Progressing)       Start:  02/06/25    Expected End:  04/30/25       Pt will perform variety of verbal closure tasks for phrases and sentences, given initial phoneme cue as needed, at 90% success rate across 3 sessions.         Short term goal 4 (Progressing)       Start:  02/06/25    Expected End:  04/30/25       Pt will perform automatic speech tasks given initial cues with 90% acc          Short term goal 5 (Progressing)       Start:  02/06/25    Expected End:  04/30/25       Patient will participate in ongoing assessment of appropriateness of low tech and high tech AAC to supplement verbal speech and meet communication needs             MEJIA Braden  03/14/2025

## 2025-03-19 ENCOUNTER — CLINICAL SUPPORT (OUTPATIENT)
Dept: REHABILITATION | Facility: HOSPITAL | Age: 80
End: 2025-03-19
Payer: MEDICARE

## 2025-03-19 DIAGNOSIS — R47.01 APHASIA DUE TO ACUTE STROKE: Primary | ICD-10-CM

## 2025-03-19 DIAGNOSIS — I63.9 APHASIA DUE TO ACUTE STROKE: Primary | ICD-10-CM

## 2025-03-19 DIAGNOSIS — R48.2 APRAXIA OF SPEECH: ICD-10-CM

## 2025-03-19 PROCEDURE — 92507 TX SP LANG VOICE COMM INDIV: CPT | Mod: PO

## 2025-03-19 NOTE — PROGRESS NOTES
Outpatient Rehab    Speech-Language Pathology Visit    Patient Name: Agapito Bass  MRN: [id number]  YOB: 1945  Encounter Date: 3/19/2025    Therapy Diagnosis:   Encounter Diagnoses   Name Primary?    Aphasia due to acute stroke Yes    Apraxia of speech      Physician: Linn Alcocer FNP    Physician Orders: Eval and Treat  Medical Diagnosis: Aphasia due to acute stroke    Visit # / Visits Authorized: 11 / 20   Date of Evaluation: 2/6/2025   Insurance Authorization Period: 2/5/2025 to 12/31/2025  Plan of Care Certification: 2/6/2025 to 4/30/2025      Time In: 1005   Time Out: 1050  Total Time: 45   Total Billable Time: 45 minutes         Subjective   Pt arrived on time and in pleasant spirits with no reported complaints.  Pain reported as 0/10.        Objective   Treatment    Short Term Goals: (12 weeks) Current Progress:   Pt will name objects with 90% acc given initial phoneme cue as needed across 3 sessions        Progressing/ Not Met 2/12/2025   - 80% accuracy given initial phoneme cues as needed; intelligibility depends on familiarity of communication partner     -Patient required occasional carrier phrase for target words this date    -Patient wrote correct word on white board in instances where he was unable to verbalize word in 4/9 opportunities (occasional spelling errors)        Met x2         2. Pt will provide 3+ words to describe a given picture/object at 80% success given min cues for initiation/word retrieval.         Progressing/ Not Met 2/12/2025   - Not addressed in today's session.        Met x2      3. Pt will perform variety of verbal closure tasks for phrases and sentences, given initial phoneme cue as needed, at 90% success rate across 3 sessions.        Progressing/ Not Met 2/12/2025   - Patient able to complete verbal closure tasks for phrases and sentences with 80% accuracy given initial phoneme cues     Met x1         4. Pt will perform automatic speech tasks given initial  cues with 90% acc       Progressing/ Not Met 2/12/2025   -counting 1-10 independently; Day of the Week minimal cues and 90% accuracy, JEANNA- moderate cues and 70% accuracy           Met x2      5. Patient will participate in ongoing assessment of appropriateness of low tech and high tech AAC to supplement verbal speech and meet communication needs       Progressing/ Not Met 2/12/2025   -Not addressed this date     Time Entry(in minutes):  Speech Treatment (Individual) Time Entry: 45    Assessment & Plan   Assessment  Pt participated well in today's session focused on oral/verbal expression. Strengths today noted in pt utilizing multi-modality approach to communicate intended messages (writing, gestures, describing). During instances of communication breakdown, pt needed minimal cues to utilize writing, gestures, or description to attempt to repair. 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 More communication successes noted with familiar communication partners opposed to unfamiliar. Pt will benefit from further exploration of AAC in future sessions.  Evaluation/Treatment Tolerance: Patient tolerated treatment well    Patient will continue to benefit from skilled outpatient speech therapy to address the deficits listed in the problem list box on initial evaluation, provide pt/family education and to maximize pt's level of independence in the home and community environment.     Patient's spiritual, cultural, and educational needs considered and patient agreeable to plan of care and goals.          Plan  Continue speech therapy POC          Goals:   Active       Long Term Goals       Long term goal 1 (Progressing)       Start:  02/06/25    Expected End:  04/30/25       Pt will develop functional expressive language skills to communicate wants,needs, and emotions effectively to variety of communication partners in medical and home environments            Short Term Goals       Short term goal 1  (Progressing)       Start:  02/06/25    Expected End:  04/30/25       Pt will name objects with 90% acc given initial phoneme cue as needed across 3 sessions           Short term goal 2 (Progressing)       Start:  02/06/25    Expected End:  04/30/25       Pt will provide 3+ words to describe a given picture/object at 80% success given min cues for initiation/word retrieval.          Short term goal 3 (Progressing)       Start:  02/06/25    Expected End:  04/30/25       Pt will perform variety of verbal closure tasks for phrases and sentences, given initial phoneme cue as needed, at 90% success rate across 3 sessions.         Short term goal 4 (Progressing)       Start:  02/06/25    Expected End:  04/30/25       Pt will perform automatic speech tasks given initial cues with 90% acc          Short term goal 5 (Progressing)       Start:  02/06/25    Expected End:  04/30/25       Patient will participate in ongoing assessment of appropriateness of low tech and high tech AAC to supplement verbal speech and meet communication needs             Marietta Castle, L-SLP, CCC-SLP

## 2025-03-21 ENCOUNTER — CLINICAL SUPPORT (OUTPATIENT)
Dept: REHABILITATION | Facility: HOSPITAL | Age: 80
End: 2025-03-21
Payer: MEDICARE

## 2025-03-21 DIAGNOSIS — R47.01 APHASIA DUE TO ACUTE STROKE: Primary | ICD-10-CM

## 2025-03-21 DIAGNOSIS — I63.9 APHASIA DUE TO ACUTE STROKE: Primary | ICD-10-CM

## 2025-03-21 DIAGNOSIS — R48.2 APRAXIA OF SPEECH: ICD-10-CM

## 2025-03-21 PROCEDURE — 92507 TX SP LANG VOICE COMM INDIV: CPT | Mod: PO

## 2025-03-21 NOTE — PROGRESS NOTES
Outpatient Rehab    Speech-Language Pathology Visit    Patient Name: Agapito Bass  MRN: [id number]  YOB: 1945  Encounter Date: 3/21/2025    Therapy Diagnosis:   Encounter Diagnoses   Name Primary?    Aphasia due to acute stroke Yes    Apraxia of speech        Physician: Linn Alcocer FNP    Physician Orders: Eval and Treat  Medical Diagnosis: Aphasia due to acute stroke    Visit # / Visits Authorized: 12 / 20   Date of Evaluation: 2/6/2025   Insurance Authorization Period: 2/5/2025 to 12/31/2025  Plan of Care Certification: 2/6/2025 to 4/30/2025      Time In: 0845   Time Out: 0930  Total Time: 45   Total Billable Time: 45 minutes         Subjective         Patient reports no pain; however, has sinus issues this date. Reports taking nasal spray.       Objective   Treatment    Short Term Goals: (12 weeks) Current Progress:   Pt will name objects with 90% acc given initial phoneme cue as needed across 3 sessions        Progressing/ Not Met 2/12/2025   - 87% accuracy given initial phoneme cues as needed; intelligibility depends on familiarity of communication partner     -Patient required few carrier phrase for target words this date    -Patient wrote correct word on white board in instances where he was unable to verbalize word in 2/4 opportunities (occasional spelling errors)        Met x2         2. Pt will provide 3+ words to describe a given picture/object at 80% success given min cues for initiation/word retrieval.         Progressing/ Not Met 2/12/2025   - Not addressed in today's session.        Met x2      3. Pt will perform variety of verbal closure tasks for phrases and sentences, given initial phoneme cue as needed, at 90% success rate across 3 sessions.        Progressing/ Not Met 2/12/2025   - Patient able to complete verbal closure tasks for phrases and sentences with 75% accuracy given initial phoneme cues and carrier phrases as needed      Met x1         4. Pt will perform automatic  speech tasks given initial cues with 90% acc       Progressing/ Not Met 2/12/2025   -counting 1-10 independently; Day of the Week minimal cues and 85% accuracy, JEANNA- moderate cues (initial phoneme) and 85% accuracy           Met x2      5. Patient will participate in ongoing assessment of appropriateness of low tech and high tech AAC to supplement verbal speech and meet communication needs       Progressing/ Not Met 2/12/2025   -Not addressed this date     Time Entry(in minutes):  Speech Treatment (Individual) Time Entry: 45    Assessment & Plan   Assessment  Pt participated well in today's session focused on oral/verbal expression. Strengths today noted in pt utilizing multi-modality approach to communicate intended messages (writing, gestures, describing). During instances of communication breakdown, pt needed minimal cues to utilize writing, gestures, or description to attempt to repair. 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 Improvement noted in naming objects this date compared to previous session. More communication successes noted with familiar communication partners opposed to unfamiliar. Pt will benefit from further exploration of AAC in future sessions.  Evaluation/Treatment Tolerance: Patient tolerated treatment well.    Patient will continue to benefit from skilled outpatient speech therapy to address the deficits listed in the problem list box on initial evaluation, provide pt/family education and to maximize pt's level of independence in the home and community environment.     Patient's spiritual, cultural, and educational needs considered and patient agreeable to plan of care and goals.          Plan  Continue speech therapy POC          Goals:   Active       Long Term Goals       Long term goal 1 (Progressing)       Start:  02/06/25    Expected End:  04/30/25       Pt will develop functional expressive language skills to communicate wants,needs, and emotions effectively to  variety of communication partners in medical and home environments            Short Term Goals       Short term goal 1 (Progressing)       Start:  02/06/25    Expected End:  04/30/25       Pt will name objects with 90% acc given initial phoneme cue as needed across 3 sessions           Short term goal 2 (Progressing)       Start:  02/06/25    Expected End:  04/30/25       Pt will provide 3+ words to describe a given picture/object at 80% success given min cues for initiation/word retrieval.          Short term goal 3 (Progressing)       Start:  02/06/25    Expected End:  04/30/25       Pt will perform variety of verbal closure tasks for phrases and sentences, given initial phoneme cue as needed, at 90% success rate across 3 sessions.         Short term goal 4 (Progressing)       Start:  02/06/25    Expected End:  04/30/25       Pt will perform automatic speech tasks given initial cues with 90% acc          Short term goal 5 (Progressing)       Start:  02/06/25    Expected End:  04/30/25       Patient will participate in ongoing assessment of appropriateness of low tech and high tech AAC to supplement verbal speech and meet communication needs             MEJIA Braden  03/21/2025

## 2025-03-26 ENCOUNTER — CLINICAL SUPPORT (OUTPATIENT)
Dept: REHABILITATION | Facility: HOSPITAL | Age: 80
End: 2025-03-26
Payer: MEDICARE

## 2025-03-26 DIAGNOSIS — R47.01 APHASIA DUE TO ACUTE STROKE: Primary | ICD-10-CM

## 2025-03-26 DIAGNOSIS — R48.2 APRAXIA OF SPEECH: ICD-10-CM

## 2025-03-26 DIAGNOSIS — I63.9 APHASIA DUE TO ACUTE STROKE: Primary | ICD-10-CM

## 2025-03-26 PROCEDURE — 92507 TX SP LANG VOICE COMM INDIV: CPT | Mod: PO

## 2025-03-26 NOTE — PROGRESS NOTES
"  Outpatient Rehab    Speech-Language Pathology Visit    Patient Name: Agapito Bass  MRN: [id number]  YOB: 1945  Encounter Date: 3/26/2025    Therapy Diagnosis:   Encounter Diagnoses   Name Primary?    Aphasia due to acute stroke Yes    Apraxia of speech          Physician: Linn Alcocer FNP    Physician Orders: Eval and Treat  Medical Diagnosis: Aphasia due to acute stroke    Visit # / Visits Authorized: 13 / 20   Date of Evaluation: 2/6/2025   Insurance Authorization Period: 2/5/2025 to 12/31/2025  Plan of Care Certification: 2/6/2025 to 4/30/2025      Time In: 0847   Time Out: 0930  Total Time: 43   Total Billable Time: 45 minutes       Subjective   Patient reports sleeping terrible and wife reports she had to "drag him here".  Pain reported as 1/10. HeadachePatient reports no pain; however, has sinus issues this date. Reports taking nasal spray.       Objective   Treatment    Short Term Goals: (12 weeks) Current Progress:   Pt will name objects with 90% acc given initial phoneme cue as needed across 3 sessions        Progressing/ Not Met 2/12/2025   - 85% accuracy given initial phoneme cues as needed; intelligibility depends on familiarity of communication partner     -Patient required no carrier phrase for target words this date    -Multiple perseverations noted this date        Met x2         2. Pt will provide 3+ words to describe a given picture/object at 80% success given min cues for initiation/word retrieval.         Progressing/ Not Met 2/12/2025   - Patient provided 3-5 words to describe pictures via [employer] serina with 75% accuracy and moderate cues for initiation/word retrieval        Met x2      3. Pt will perform variety of verbal closure tasks for phrases and sentences, given initial phoneme cue as needed, at 90% success rate across 3 sessions.        Progressing/ Not Met 2/12/2025   - Patient able to complete verbal closure tasks for phrases and sentences with 85% accuracy " given initial phoneme cues      Met x1         4. Pt will perform automatic speech tasks given initial cues with 90% acc       Progressing/ Not Met 2/12/2025   -counting 1-10 independently; Day of the Week minimal cues and 90% accuracy, JEANNA- moderate cues (initial phoneme) and 87% accuracy    -The patient utilized a written aid to support recall of the days of the week during communication breakdowns.        Met x2      5. Patient will participate in ongoing assessment of appropriateness of low tech and high tech AAC to supplement verbal speech and meet communication needs       Progressing/ Not Met 2/12/2025   -Not addressed this date     Time Entry(in minutes):  Speech Treatment (Individual) Time Entry: 43    Assessment & Plan   Assessment  Pt participated well in today's session focused on oral/verbal expression. Patient transferred to therapy with some hesitation this date. Wife reported he did not want to attend today and she 'dragged him here.' However, once patient was in the therapy room, he participated well. Wife reported after the session that they had difficulty communicating all weekend and he experienced much disappointment. Improvement with verbal closure tasks this date compared to previous session. During identifying object task, more perseverations were noted as compared to previous session. However, patient was able to repair communication breakdown by using white board and dry erase marker. Patient utilized written communication when doing days of the week, which was a strength this session. During instances of communication breakdown, pt needed minimal cues to utilize writing, gestures, or description to attempt to repair. Patient was able to provide 3-5 words when describing pictures on [employer] serina, using descriptors such as color, smell and size. Occasional use of gestures during this task. 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 More communication successes noted  with familiar communication partners opposed to unfamiliar. Pt will benefit from further exploration of AAC in future sessions.  Evaluation/Treatment Tolerance: Patient tolerated treatment well.    Patient will continue to benefit from skilled outpatient speech therapy to address the deficits listed in the problem list box on initial evaluation, provide pt/family education and to maximize pt's level of independence in the home and community environment.     Patient's spiritual, cultural, and educational needs considered and patient agreeable to plan of care and goals.          Plan  Continue speech therapy POC          Goals:   Active       Long Term Goals       Long term goal 1 (Progressing)       Start:  02/06/25    Expected End:  04/30/25       Pt will develop functional expressive language skills to communicate wants,needs, and emotions effectively to variety of communication partners in medical and home environments            Short Term Goals       Short term goal 1 (Progressing)       Start:  02/06/25    Expected End:  04/30/25       Pt will name objects with 90% acc given initial phoneme cue as needed across 3 sessions           Short term goal 2 (Progressing)       Start:  02/06/25    Expected End:  04/30/25       Pt will provide 3+ words to describe a given picture/object at 80% success given min cues for initiation/word retrieval.          Short term goal 3 (Progressing)       Start:  02/06/25    Expected End:  04/30/25       Pt will perform variety of verbal closure tasks for phrases and sentences, given initial phoneme cue as needed, at 90% success rate across 3 sessions.         Short term goal 4 (Progressing)       Start:  02/06/25    Expected End:  04/30/25       Pt will perform automatic speech tasks given initial cues with 90% acc          Short term goal 5 (Progressing)       Start:  02/06/25    Expected End:  04/30/25       Patient will participate in ongoing assessment of appropriateness of low  tech and high tech AAC to supplement verbal speech and meet communication needs             BRENDAN Braden-SLP  03/26/2025

## 2025-03-28 ENCOUNTER — CLINICAL SUPPORT (OUTPATIENT)
Dept: REHABILITATION | Facility: HOSPITAL | Age: 80
End: 2025-03-28
Payer: MEDICARE

## 2025-03-28 DIAGNOSIS — R48.2 APRAXIA OF SPEECH: ICD-10-CM

## 2025-03-28 DIAGNOSIS — R47.01 APHASIA DUE TO ACUTE STROKE: Primary | ICD-10-CM

## 2025-03-28 DIAGNOSIS — I63.9 APHASIA DUE TO ACUTE STROKE: Primary | ICD-10-CM

## 2025-03-28 PROCEDURE — 92507 TX SP LANG VOICE COMM INDIV: CPT | Mod: PO

## 2025-03-28 NOTE — PROGRESS NOTES
Outpatient Rehab    Speech-Language Pathology Visit    Patient Name: Agapito Bass  MRN: [id number]  YOB: 1945  Encounter Date: 3/28/2025    Therapy Diagnosis:   Encounter Diagnoses   Name Primary?    Aphasia due to acute stroke Yes    Apraxia of speech        Physician: Linn Aloccer FNP    Physician Orders: Eval and Treat  Medical Diagnosis: Aphasia due to acute stroke    Visit # / Visits Authorized: 14 / 20   Date of Evaluation: 2/6/2025   Insurance Authorization Period: 2/5/2025 to 12/31/2025  Plan of Care Certification: 2/6/2025 to 4/30/2025      Time In: 0815   Time Out: 0900  Total Time: 45   Total Billable Time: 45 minutes       Subjective   Patient reports sleeping well and showed up in pleasant spirits..  Pain reported as 0/10.      Objective   Treatment    Short Term Goals: (12 weeks) Current Progress:   Pt will name objects with 90% acc given initial phoneme cue as needed across 3 sessions        Progressing/ Not Met 2/12/2025   - 87% accuracy given initial phoneme cues as needed; intelligibility depends on familiarity of communication partner    -Less perseverations noted this date        Met x2         2. Pt will provide 3+ words to describe a given picture/object at 80% success given min cues for initiation/word retrieval.         Progressing/ Not Met 2/12/2025   - Patient provided 3-5 words to describe pictures via [employer] with 85% accuracy and moderate cues for initiation/word retrieval        Met x2      3. Pt will perform variety of verbal closure tasks for phrases and sentences, given initial phoneme cue as needed, at 90% success rate across 3 sessions.        Progressing/ Not Met 2/12/2025   - Patient able to complete verbal closure tasks for phrases and sentences with 90% accuracy given initial phoneme cues     -Intelligibility depends on familiarity of communication partner     Met x2         4. Pt will perform automatic speech tasks given initial cues with 90%  acc       Progressing/ Not Met 2/12/2025   -counting 1-10 independently; Day of the Week minimal cues and 90% accuracy, JEANNA- moderate cues (initial phoneme) and 80% accuracy    -The patient used a written aid to support verbalization of the JEANNA during communication breakdowns.        Met x2      5. Patient will participate in ongoing assessment of appropriateness of low tech and high tech AAC to supplement verbal speech and meet communication needs       Progressing/ Not Met 2/12/2025   -Not addressed this date     Time Entry(in minutes):  Speech Treatment (Individual) Time Entry: 45    Assessment & Plan   Assessment  Pt participated well in today's session focused on oral/verbal expression. Improvement with verbal closure tasks this date compared to previous session. During identifying object task, less perseverations were noted as compared to previous session. Strengths in today's session include patient's ability to repair communication breakdown by using white board and dry erase marker. Patient utilized written communication when doing months of the year. During instances of communication breakdown, pt needed minimal cues to utilize writing, gestures, or description to attempt to repair. Patient was able to provide 3-5 words when describing pictures on [employer] ser[employer], using descriptors such as color or gender. More cues used this date for describing pictures to get patient to describe in more detail. 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 More communication successes noted with familiar communication partners opposed to unfamiliar. Pt will benefit from further exploration of AAC in future sessions.  Evaluation/Treatment Tolerance: Patient tolerated treatment well.    Patient will continue to benefit from skilled outpatient speech therapy to address the deficits listed in the problem list box on initial evaluation, provide pt/family education and to maximize pt's level of independence  in the home and community environment.     Patient's spiritual, cultural, and educational needs considered and patient agreeable to plan of care and goals.          Plan  Continue speech therapy POC          Goals:   Active       Long Term Goals       Long term goal 1 (Progressing)       Start:  02/06/25    Expected End:  04/30/25       Pt will develop functional expressive language skills to communicate wants,needs, and emotions effectively to variety of communication partners in medical and home environments            Short Term Goals       Short term goal 1 (Progressing)       Start:  02/06/25    Expected End:  04/30/25       Pt will name objects with 90% acc given initial phoneme cue as needed across 3 sessions           Short term goal 2 (Progressing)       Start:  02/06/25    Expected End:  04/30/25       Pt will provide 3+ words to describe a given picture/object at 80% success given min cues for initiation/word retrieval.          Short term goal 3 (Progressing)       Start:  02/06/25    Expected End:  04/30/25       Pt will perform variety of verbal closure tasks for phrases and sentences, given initial phoneme cue as needed, at 90% success rate across 3 sessions.         Short term goal 4 (Progressing)       Start:  02/06/25    Expected End:  04/30/25       Pt will perform automatic speech tasks given initial cues with 90% acc          Short term goal 5 (Progressing)       Start:  02/06/25    Expected End:  04/30/25       Patient will participate in ongoing assessment of appropriateness of low tech and high tech AAC to supplement verbal speech and meet communication needs             MEJIA Braden  03/31/2025

## 2025-04-02 ENCOUNTER — CLINICAL SUPPORT (OUTPATIENT)
Dept: REHABILITATION | Facility: HOSPITAL | Age: 80
End: 2025-04-02
Payer: MEDICARE

## 2025-04-02 DIAGNOSIS — R48.2 APRAXIA OF SPEECH: ICD-10-CM

## 2025-04-02 DIAGNOSIS — R47.01 APHASIA DUE TO ACUTE STROKE: Primary | ICD-10-CM

## 2025-04-02 DIAGNOSIS — I63.9 APHASIA DUE TO ACUTE STROKE: Primary | ICD-10-CM

## 2025-04-02 PROCEDURE — 92507 TX SP LANG VOICE COMM INDIV: CPT | Mod: PO

## 2025-04-02 NOTE — PROGRESS NOTES
Outpatient Rehab    Speech-Language Pathology Visit    Patient Name: Agapito Bass  MRN: [id number]  YOB: 1945  Encounter Date: 4/2/2025    Therapy Diagnosis:   Encounter Diagnoses   Name Primary?    Aphasia due to acute stroke Yes    Apraxia of speech      Physician: Linn Alcocer FNP    Physician Orders: Eval and Treat  Medical Diagnosis: Aphasia due to acute stroke    Visit # / Visits Authorized: 15 / 20   Insurance Authorization Period: 2/5/2025 to 12/31/2025  Plan of Care Certification: 2/6/2025 to 4/30/2025      Time In: 1000   Time Out: 1050  Total Time: 50   Total Billable Time: 50      Subjective   Pt arrived on time and in pleasant spirits with no reported complaints.  Pain reported as 0/10.        Objective          Treatment  Short Term Goals: (12 weeks) Current Progress:   Pt will name objects with 90% acc given initial phoneme cue as needed across 3 sessions        Progressing/ Not Met 2/12/2025   - Not addressed this date        Met x2         2. Pt will provide 3+ words to describe a given picture/object at 80% success given min cues for initiation/word retrieval.         Progressing/ Not Met 2/12/2025   - Patient provided 3-5 words to describe pictures via [employer] with 75% accuracy and moderate cues for initiation/word retrieval        Met x2      3. Pt will perform variety of verbal closure tasks for phrases and sentences, given initial phoneme cue as needed, at 90% success rate across 3 sessions.        Progressing/ Not Met 2/12/2025   - Patient able to complete a variety of verbal closure tasks in session for phrases and sentences with 80% accuracy given initial phoneme cues      -Intelligibility at times depends on familiarity of communication partner     Met x2         4. Pt will perform automatic speech tasks given initial cues with 90% acc       Progressing/ Not Met 2/12/2025   -counting 1-10 independently with 100% accuracy; Day of the Week independently and  100% accuracy, JEANNA- moderate cues (initial phoneme) and 70% accuracy     -The patient used a written aid to support verbalization of the JEANNA during communication breakdowns.        Met x2      5. Patient will participate in ongoing assessment of appropriateness of low tech and high tech AAC to supplement verbal speech and meet communication needs       Progressing/ Not Met 2/12/2025   -Not addressed this date       Time Entry(in minutes):  Speech Treatment (Individual) Time Entry: 50    Assessment & Plan   Assessment  Pt participated well in today's session focused on oral/verbal expression. Strengths today noted in pt utilizing multi-modality approach to communicate intended messages (writing, gestures, describing). During instances of communication breakdown, pt needed minimal cues to utilize writing, gestures, or description to attempt to repair. 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 Pt continues to have difficulty reciting JEANNA but does well with other automatic speech tasks. More communication successes noted with familiar communication partners opposed to unfamiliar.  Evaluation/Treatment Tolerance: Patient tolerated treatment well    Patient will continue to benefit from skilled outpatient speech therapy to address the deficits listed in the problem list box on initial evaluation, provide pt/family education and to maximize pt's level of independence in the home and community environment.     Patient's spiritual, cultural, and educational needs considered and patient agreeable to plan of care and goals.     Education  Education was done with Patient. The patient's learning style includes Listening. The patient Verbalizes understanding and Requires continuing/additional education.         Plan  Continue ST POC          Goals:   Active       Long Term Goals       Long term goal 1 (Progressing)       Start:  02/06/25    Expected End:  04/30/25       Pt will develop functional expressive  language skills to communicate wants,needs, and emotions effectively to variety of communication partners in medical and home environments            Short Term Goals       Short term goal 1 (Progressing)       Start:  02/06/25    Expected End:  04/30/25       Pt will name objects with 90% acc given initial phoneme cue as needed across 3 sessions           Short term goal 2 (Progressing)       Start:  02/06/25    Expected End:  04/30/25       Pt will provide 3+ words to describe a given picture/object at 80% success given min cues for initiation/word retrieval.          Short term goal 3 (Progressing)       Start:  02/06/25    Expected End:  04/30/25       Pt will perform variety of verbal closure tasks for phrases and sentences, given initial phoneme cue as needed, at 90% success rate across 3 sessions.         Short term goal 4 (Progressing)       Start:  02/06/25    Expected End:  04/30/25       Pt will perform automatic speech tasks given initial cues with 90% acc          Short term goal 5 (Progressing)       Start:  02/06/25    Expected End:  04/30/25       Patient will participate in ongoing assessment of appropriateness of low tech and high tech AAC to supplement verbal speech and meet communication needs             Marietta Castle L-SLP, CCC-SLP

## 2025-04-04 ENCOUNTER — CLINICAL SUPPORT (OUTPATIENT)
Dept: REHABILITATION | Facility: HOSPITAL | Age: 80
End: 2025-04-04
Payer: MEDICARE

## 2025-04-04 DIAGNOSIS — I63.9 APHASIA DUE TO ACUTE STROKE: Primary | ICD-10-CM

## 2025-04-04 DIAGNOSIS — R47.01 APHASIA DUE TO ACUTE STROKE: Primary | ICD-10-CM

## 2025-04-04 DIAGNOSIS — R48.2 APRAXIA OF SPEECH: ICD-10-CM

## 2025-04-04 PROCEDURE — 92507 TX SP LANG VOICE COMM INDIV: CPT | Mod: PO

## 2025-04-04 NOTE — PROGRESS NOTES
Outpatient Rehab    Speech-Language Pathology Progress Note    Patient Name: Agapito Bass  MRN: [id number]  YOB: 1945  Encounter Date: 4/4/2025    Therapy Diagnosis:   Encounter Diagnoses   Name Primary?    Aphasia due to acute stroke Yes    Apraxia of speech      Physician: Linn Alcocer FNP    Physician Orders: Eval and Treat  Medical Diagnosis: Aphasia due to acute stroke    Visit # / Visits Authorized: 16 / 20   Insurance Authorization Period: 2/5/2025 to 12/31/2025  Date of Evaluation: 2/6/2025   Plan of Care Certification: 2/6/2025 to 4/30/2025     Time In: 0815   Time Out: 0900  Total Time: 45     Subjective   Patient reports feeling well and showed up in pleasant spirits.         Objective          Treatment  Short Term Goals: (12 weeks) Current Progress:   Pt will name objects with 90% acc given initial phoneme cue as needed across 3 sessions        Progressing/ Not Met 2/12/2025   - Named objects with 85% accuracy given initial phoneme cues    - Utilized written aid to support verbal output        Met x2         2. Pt will provide 3+ words to describe a given picture/object at 80% success given min cues for initiation/word retrieval.         Progressing/ Not Met 2/12/2025   - Not addressed in today's session.        Met x2      3. Pt will perform variety of verbal closure tasks for phrases and sentences, given initial phoneme cue as needed, at 90% success rate across 3 sessions.        Progressing/ Not Met 2/12/2025   - Patient able to complete verbal closure tasks in session for phrases with 90% accuracy given initial phoneme cues; limited trials this date      -Intelligibility at times depends on familiarity of communication partner     Met x2         4. Pt will perform automatic speech tasks given initial cues with 90% acc       Progressing/ Not Met 2/12/2025   -counting 1-10 independently with 100% accuracy; Day of the Week minimal cues and 95% accuracy, JEANNA- moderate cues (initial  phoneme) and 80% accuracy      -The patient used a written aid to support verbalization of the JEANNA during communication breakdowns.        Met x2      5. Patient will participate in ongoing assessment of appropriateness of low tech and high tech AAC to supplement verbal speech and meet communication needs       Progressing/ Not Met 2/12/2025   -Not addressed this date      Treatment       Time Entry(in minutes):  Speech Treatment (Individual) Time Entry: 45    Assessment & Plan   Assessment  Patient participated well in today's session focused on oral/verbal expression. Strengths today noted in pt utilizing multi-modality approach to communicate intended messages (writing, gestures, describing). During instances of communication breakdown, pt needed minimal cues to utilize writing, gestures, or description to attempt to repair. 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 More perseverations were noted today during object identification; however, the patient demonstrated awareness and attempted to repair communication when needed. Pt continues to have difficulty reciting JEANNA but does well with other automatic speech tasks. More communication successes noted with familiar communication partners opposed to unfamiliar. Patient will continue to benefit from skilled speech therapy intervention to improve expressive language. He is progressing well toward his goals and remains highly motivated to improve his communication abilities. While he continues to present with deficits in word-finding, speech intelligibility, and verbal expression, he is actively implementing strategies discussed in therapy. Current goals remain appropriate.       Patient will continue to benefit from skilled outpatient speech therapy to address the deficits listed in the problem list box on initial evaluation, provide pt/family education and to maximize pt's level of independence in the home and community environment.      Patient's spiritual, cultural, and educational needs considered and patient agreeable to plan of care and goals.          Plan  Continue ST POC          Goals:   Active       Long Term Goals       Long term goal 1 (Progressing)       Start:  02/06/25    Expected End:  04/30/25       Pt will develop functional expressive language skills to communicate wants,needs, and emotions effectively to variety of communication partners in medical and home environments            Short Term Goals       Short term goal 1 (Progressing)       Start:  02/06/25    Expected End:  04/30/25       Pt will name objects with 90% acc given initial phoneme cue as needed across 3 sessions           Short term goal 2 (Progressing)       Start:  02/06/25    Expected End:  04/30/25       Pt will provide 3+ words to describe a given picture/object at 80% success given min cues for initiation/word retrieval.          Short term goal 3 (Progressing)       Start:  02/06/25    Expected End:  04/30/25       Pt will perform variety of verbal closure tasks for phrases and sentences, given initial phoneme cue as needed, at 90% success rate across 3 sessions.         Short term goal 4 (Progressing)       Start:  02/06/25    Expected End:  04/30/25       Pt will perform automatic speech tasks given initial cues with 90% acc          Short term goal 5 (Progressing)       Start:  02/06/25    Expected End:  04/30/25       Patient will participate in ongoing assessment of appropriateness of low tech and high tech AAC to supplement verbal speech and meet communication needs             BRENDAN Braden-SLP

## 2025-04-09 ENCOUNTER — CLINICAL SUPPORT (OUTPATIENT)
Dept: REHABILITATION | Facility: HOSPITAL | Age: 80
End: 2025-04-09
Payer: MEDICARE

## 2025-04-09 DIAGNOSIS — I63.9 APHASIA DUE TO ACUTE STROKE: Primary | ICD-10-CM

## 2025-04-09 DIAGNOSIS — R47.01 APHASIA DUE TO ACUTE STROKE: Primary | ICD-10-CM

## 2025-04-09 DIAGNOSIS — R48.2 APRAXIA OF SPEECH: ICD-10-CM

## 2025-04-09 PROCEDURE — 92507 TX SP LANG VOICE COMM INDIV: CPT | Mod: PO

## 2025-04-09 NOTE — PROGRESS NOTES
Outpatient Rehab    Speech-Language Pathology Progress Note    Patient Name: Agapito Bass  MRN: [id number]  YOB: 1945  Encounter Date: 4/9/2025    Therapy Diagnosis:   Encounter Diagnoses   Name Primary?    Aphasia due to acute stroke Yes    Apraxia of speech        Physician: Linn Alcocer FNP    Physician Orders: Eval and Treat  Medical Diagnosis: Aphasia due to acute stroke    Visit # / Visits Authorized: 17 / 20   Insurance Authorization Period: 2/5/2025 to 12/31/2025  Date of Evaluation: 2/6/2025   Plan of Care Certification: 2/6/2025 to 4/30/2025     Time In: 0800   Time Out: 0845  Total Time: 45     Subjective   Patient arrived on time and in pleasant spirits.         Objective          Treatment  Short Term Goals: (12 weeks) Current Progress:   Pt will name objects with 90% acc given initial phoneme cue as needed across 3 sessions        Progressing/ Not Met 2/12/2025   - Named objects with 88% accuracy given initial phoneme cues    - Utilized written aid to support verbal output        Met x2      2. Pt will provide 3+ words to describe a given picture/object at 80% success given min cues for initiation/word retrieval.         Progressing/ Not Met 2/12/2025   - Not addressed in today's session.        Met x2      3. Pt will perform variety of verbal closure tasks for phrases and sentences, given initial phoneme cue as needed, at 90% success rate across 3 sessions.        Progressing/ Not Met 2/12/2025   - Patient able to complete verbal closure tasks in session for phrases with 90% accuracy given initial phoneme cues      -Intelligibility at times depends on familiarity of communication partner     Met x3 4/9/25         4. Pt will perform automatic speech tasks given initial cues with 90% acc       Progressing/ Not Met 2/12/2025   -counting 1-10 independently with 100% accuracy; Day of the Week minimal cues and 90% accuracy, JEANNA- moderate cues (initial phoneme) and 80% accuracy      -The  patient used a written aid to support verbalization of the JEANNA during communication breakdowns.        Met x2      5. Patient will participate in ongoing assessment of appropriateness of low tech and high tech AAC to supplement verbal speech and meet communication needs       Progressing/ Not Met 2/12/2025   -Not addressed this date      Treatment       Time Entry(in minutes):  Speech Treatment (Individual) Time Entry: 45    Assessment & Plan   Assessment  Patient participated well in today's session focused on oral/verbal expression. 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 Less perseverations were noted today during object identification. Strengths noted in today's session include the patient's demonstration of awareness and attempts to repair communication when needed. Pt continues to have difficulty reciting JEANNA; however, uses written aid to support verbalization. More communication successes noted with familiar communication partners opposed to unfamiliar. Patient will continue to benefit from skilled speech therapy intervention to improve expressive language. He is progressing well toward his goals and remains highly motivated to improve his communication abilities. While he continues to present with deficits in word-finding, speech intelligibility, and verbal expression, he is actively implementing strategies discussed in therapy. Current goals remain appropriate.  Evaluation/Treatment Tolerance: Patient tolerated treatment well    Patient will continue to benefit from skilled outpatient speech therapy to address the deficits listed in the problem list box on initial evaluation, provide pt/family education and to maximize pt's level of independence in the home and community environment.     Patient's spiritual, cultural, and educational needs considered and patient agreeable to plan of care and goals.          Plan  Continue ST POC          Goals:   Active       Long Term Goals       Long  term goal 1 (Progressing)       Start:  02/06/25    Expected End:  04/30/25       Pt will develop functional expressive language skills to communicate wants,needs, and emotions effectively to variety of communication partners in medical and home environments            Short Term Goals       Short term goal 1 (Progressing)       Start:  02/06/25    Expected End:  04/30/25       Pt will name objects with 90% acc given initial phoneme cue as needed across 3 sessions           Short term goal 2 (Progressing)       Start:  02/06/25    Expected End:  04/30/25       Pt will provide 3+ words to describe a given picture/object at 80% success given min cues for initiation/word retrieval.          Short term goal 3 (Met)       Start:  02/06/25    Expected End:  04/30/25    Resolved:  04/09/25    Pt will perform variety of verbal closure tasks for phrases and sentences, given initial phoneme cue as needed, at 90% success rate across 3 sessions.         Short term goal 4 (Progressing)       Start:  02/06/25    Expected End:  04/30/25       Pt will perform automatic speech tasks given initial cues with 90% acc          Short term goal 5 (Progressing)       Start:  02/06/25    Expected End:  04/30/25       Patient will participate in ongoing assessment of appropriateness of low tech and high tech AAC to supplement verbal speech and meet communication needs             BRENDAN Braden-SLP

## 2025-04-11 ENCOUNTER — CLINICAL SUPPORT (OUTPATIENT)
Dept: REHABILITATION | Facility: HOSPITAL | Age: 80
End: 2025-04-11
Payer: MEDICARE

## 2025-04-11 DIAGNOSIS — I63.9 APHASIA DUE TO ACUTE STROKE: Primary | ICD-10-CM

## 2025-04-11 DIAGNOSIS — R47.01 APHASIA DUE TO ACUTE STROKE: Primary | ICD-10-CM

## 2025-04-11 DIAGNOSIS — R48.2 APRAXIA OF SPEECH: ICD-10-CM

## 2025-04-11 PROCEDURE — 92507 TX SP LANG VOICE COMM INDIV: CPT | Mod: KX,PO

## 2025-04-11 NOTE — PROGRESS NOTES
Outpatient Rehab    Speech-Language Pathology Progress Note    Patient Name: Agapito Bass  MRN: [id number]  YOB: 1945  Encounter Date: 4/11/2025    Therapy Diagnosis:   Encounter Diagnoses   Name Primary?    Aphasia due to acute stroke Yes    Apraxia of speech          Physician: Linn Alcocer FNP    Physician Orders: Eval and Treat  Medical Diagnosis: Aphasia due to acute stroke    Visit # / Visits Authorized: 18 / 20   Insurance Authorization Period: 2/5/2025 to 12/31/2025  Date of Evaluation: 2/6/2025   Plan of Care Certification: 2/6/2025 to 4/30/2025     Time In: 0825   Time Out: 0910  Total Time: 45     Subjective   Patient reports feeling well and showed up in pleasant spirits.         Objective          Treatment  Short Term Goals: (12 weeks) Current Progress:   Pt will name objects with 90% acc given initial phoneme cue as needed across 3 sessions        Progressing/ Not Met 2/12/2025   - Named objects with 85% accuracy given initial phoneme cues    - Utilized written aid to support verbal output        Met x2      2. Pt will provide 3+ words to describe a given picture/object at 80% success given min cues for initiation/word retrieval.         Progressing/ Not Met 2/12/2025   - Patient provided 3-5 words consistently to describe pictures with 85% accuracy given minimal cues for initiation/word retrieval     - Used written aid to support verbal output     -Intelligibility is dependent of familiarity of communication partner         Met x3 4/11/25      3. Pt will perform variety of verbal closure tasks for phrases and sentences, given initial phoneme cue as needed, at 90% success rate across 3 sessions.        Progressing/ Not Met 2/12/2025   Goal Met         4. Pt will perform automatic speech tasks given initial cues with 90% acc       Progressing/ Not Met 2/12/2025   -counting 1-10 independently with 100% accuracy; Day of the Week minimal cues and 95% accuracy, JEANNA- moderate cues  "(initial phoneme) and 75% accuracy      -Multiple perseverations during JEANNA    -Given visual aid, accuracy increased to 85%         Met x2      5. Patient will participate in ongoing assessment of appropriateness of low tech and high tech AAC to supplement verbal speech and meet communication needs       Progressing/ Not Met 2/12/2025   -Low tech AAC used throughout therapy via paper and mini notebook given to patient; patient's preferred method to communicate during breakdowns is through writing in the air or paper and gestures    - High tech AAC explored today via TD Snap; patient can navigate up to 2 folders when asked questions such as "What would you select if you ___"           Treatment       Time Entry(in minutes):  Speech Treatment (Individual) Time Entry: 45    Assessment & Plan   Assessment  Patient participated well in today's session focused on oral/verbal expression and exploration of low/high tech AAC. 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 Pt continues to have difficulty reciting JEANNA; however, uses written aid to support verbalization. More perseverations were noted today during automatic speech tasks as compared to previous sessions. Strengths noted in today's session include the patient's demonstration of awareness and attempts to repair communication when needed. More communication successes noted with familiar communication partners opposed to unfamiliar. Pocket-sized notebook given to patient to help support him in other environments when communication breakdowns occur. Time spent exploring TD Snap and patient demonstrated success navigating up to 2 folders. Patient will continue to benefit from skilled speech therapy intervention to improve expressive language. He is progressing well toward his goals and remains highly motivated to improve his communication abilities. While he continues to present with deficits in word-finding, speech intelligibility, and verbal " expression, he is actively implementing strategies discussed in therapy. Current goals remain appropriate.  Evaluation/Treatment Tolerance: Patient tolerated treatment well    Patient will continue to benefit from skilled outpatient speech therapy to address the deficits listed in the problem list box on initial evaluation, provide pt/family education and to maximize pt's level of independence in the home and community environment.     Patient's spiritual, cultural, and educational needs considered and patient agreeable to plan of care and goals.          Plan  Continue ST POC          Goals:   Active       Long Term Goals       Long term goal 1 (Progressing)       Start:  02/06/25    Expected End:  04/30/25       Pt will develop functional expressive language skills to communicate wants,needs, and emotions effectively to variety of communication partners in medical and home environments            Short Term Goals       Short term goal 1 (Progressing)       Start:  02/06/25    Expected End:  04/30/25       Pt will name objects with 90% acc given initial phoneme cue as needed across 3 sessions           Short term goal 2 (Met)       Start:  02/06/25    Expected End:  04/30/25    Resolved:  04/11/25    Pt will provide 3+ words to describe a given picture/object at 80% success given min cues for initiation/word retrieval.          Short term goal 3 (Met)       Start:  02/06/25    Expected End:  04/30/25    Resolved:  04/09/25    Pt will perform variety of verbal closure tasks for phrases and sentences, given initial phoneme cue as needed, at 90% success rate across 3 sessions.         Short term goal 4 (Progressing)       Start:  02/06/25    Expected End:  04/30/25       Pt will perform automatic speech tasks given initial cues with 90% acc          Short term goal 5 (Progressing)       Start:  02/06/25    Expected End:  04/30/25       Patient will participate in ongoing assessment of appropriateness of low tech and  high tech AAC to supplement verbal speech and meet communication needs             Leia Figueredo CF-SLP

## 2025-04-14 ENCOUNTER — CLINICAL SUPPORT (OUTPATIENT)
Dept: REHABILITATION | Facility: HOSPITAL | Age: 80
End: 2025-04-14
Payer: MEDICARE

## 2025-04-14 DIAGNOSIS — R48.2 APRAXIA OF SPEECH: ICD-10-CM

## 2025-04-14 DIAGNOSIS — R47.01 APHASIA DUE TO ACUTE STROKE: Primary | ICD-10-CM

## 2025-04-14 DIAGNOSIS — I63.9 APHASIA DUE TO ACUTE STROKE: Primary | ICD-10-CM

## 2025-04-14 PROCEDURE — 92507 TX SP LANG VOICE COMM INDIV: CPT | Mod: PO

## 2025-04-14 NOTE — PROGRESS NOTES
Outpatient Rehab    Speech-Language Pathology Visit    Patient Name: Agapito Bass  MRN: [id number]  YOB: 1945  Encounter Date: 4/14/2025    Therapy Diagnosis:   Encounter Diagnoses   Name Primary?    Aphasia due to acute stroke Yes    Apraxia of speech      Physician: Linn Alcocer FNP    Physician Orders: Eval and Treat  Medical Diagnosis: Aphasia due to acute stroke    Visit # / Visits Authorized: 19 / 40   Insurance Authorization Period: 2/5/2025 to 12/31/2025  Date of Evaluation: 2/6/2025   Plan of Care Certification: 2/6/2025 to 4/30/2025     Time In: 1102   Time Out: 1145  Total Time: 43   Total Billable Time: 43     Subjective   Pt reports sleeping well and showed up in pleasant spirits.           Objective          Treatment     Short Term Goals: (12 weeks) Current Progress:   Pt will name objects with 90% acc given initial phoneme cue as needed across 3 sessions        Progressing/ Not Met 2/12/2025   - Named objects with 85% accuracy given initial phoneme cues    - Utilized written aid to support verbal output        Met x2      2. Pt will provide 3+ words to describe a given picture/object at 80% success given min cues for initiation/word retrieval.         Progressing/ Not Met 2/12/2025   Met Goal       3. Pt will perform variety of verbal closure tasks for phrases and sentences, given initial phoneme cue as needed, at 90% success rate across 3 sessions.        Progressing/ Not Met 2/12/2025   Met goal         4. Pt will perform automatic speech tasks given initial cues with 90% acc       Progressing/ Not Met 2/12/2025   -counting 1-10 independently with 100% accuracy; Day of the Week minimal cues and 90% accuracy, JEANNA- minimal cues (initial phoneme) and 85% accuracy      -Less perseverations during JEANNA this date; however, patient requires initial phoneme cues and written aid to support communication      Met x2      5. Patient will participate in ongoing assessment of appropriateness  of low tech and high tech AAC to supplement verbal speech and meet communication needs       Progressing/ Not Met 2/12/2025   -Low tech AAC used throughout therapy via paper and pen; patient's preferred method to communicate during breakdowns is through paper and gestures    - High tech AAC explored today via TD Snap; time spent creating profile for patient in software, adding his name, wife's name, son's name, and daughter's name            Time Entry(in minutes):  Speech Treatment (Individual) Time Entry: 43    Assessment & Plan   Assessment  Patient participated well in today's session focused on oral/verbal expression and exploration of low/high tech AAC. 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 Pt with consistent difficulty reciting JEANNA; however, uses written aid to support verbalization. Less perseverations noted today during automatic speech tasks as compared to previous sessions. More communication successes noted with familiar communication partners opposed to unfamiliar. When communication breakdowns occur, patient utilizes written aids to support communication with partner. Time spent exploring and creating profile on TD Snap; patient demonstrated success navigating up to 2 folders. Patient will continue to benefit from skilled speech therapy intervention to improve expressive language. He is progressing well towards his goals. Current goals remain appropriate and will updated as needed.  Evaluation/Treatment Tolerance: Patient tolerated treatment well    Patient will continue to benefit from skilled outpatient speech therapy to address the deficits listed in the problem list box on initial evaluation, provide pt/family education and to maximize pt's level of independence in the home and community environment.     Patient's spiritual, cultural, and educational needs considered and patient agreeable to plan of care and goals.          Plan  Continue ST POC          Goals:   Active        Long Term Goals       Long term goal 1 (Progressing)       Start:  02/06/25    Expected End:  04/30/25       Pt will develop functional expressive language skills to communicate wants,needs, and emotions effectively to variety of communication partners in medical and home environments            Short Term Goals       Short term goal 1 (Progressing)       Start:  02/06/25    Expected End:  04/30/25       Pt will name objects with 90% acc given initial phoneme cue as needed across 3 sessions           Short term goal 2 (Met)       Start:  02/06/25    Expected End:  04/30/25    Resolved:  04/11/25    Pt will provide 3+ words to describe a given picture/object at 80% success given min cues for initiation/word retrieval.          Short term goal 3 (Met)       Start:  02/06/25    Expected End:  04/30/25    Resolved:  04/09/25    Pt will perform variety of verbal closure tasks for phrases and sentences, given initial phoneme cue as needed, at 90% success rate across 3 sessions.         Short term goal 4 (Progressing)       Start:  02/06/25    Expected End:  04/30/25       Pt will perform automatic speech tasks given initial cues with 90% acc          Short term goal 5 (Progressing)       Start:  02/06/25    Expected End:  04/30/25       Patient will participate in ongoing assessment of appropriateness of low tech and high tech AAC to supplement verbal speech and meet communication needs             BRENDAN Braden-SLP

## 2025-04-14 NOTE — PROGRESS NOTES
Outpatient Rehab    Speech-Language Pathology Progress Note    Patient Name: Agapito Bass  MRN: [id number]  YOB: 1945  Encounter Date: 4/14/2025    Therapy Diagnosis:   Encounter Diagnoses   Name Primary?    Aphasia due to acute stroke Yes    Apraxia of speech      Physician: Linn Alcocer FNP    Physician Orders: Eval and Treat  Medical Diagnosis: Aphasia due to acute stroke    Visit # / Visits Authorized: 19 / 40   Insurance Authorization Period: 2/5/2025 to 12/31/2025  Date of Evaluation: 2/6/2025   Plan of Care Certification: 2/6/2025 to 4/30/2025     Time In: 1102   Time Out: 1145  Total Time: 43     Subjective             Objective          Treatment  Short Term Goals: (12 weeks) Current Progress:   Pt will name objects with 90% acc given initial phoneme cue as needed across 3 sessions        Progressing/ Not Met 2/12/2025   - Named objects with 85% accuracy given initial phoneme cues    - Utilized written aid to support verbal output        Met x2      2. Pt will provide 3+ words to describe a given picture/object at 80% success given min cues for initiation/word retrieval.         Progressing/ Not Met 2/12/2025   - Not addressed in today's session.      Goal Met        3. Pt will perform variety of verbal closure tasks for phrases and sentences, given initial phoneme cue as needed, at 90% success rate across 3 sessions.        Progressing/ Not Met 2/12/2025   Goal Met         4. Pt will perform automatic speech tasks given initial cues with 90% acc       Progressing/ Not Met 2/12/2025   -counting 1-10 independently with 100% accuracy; Day of the Week minimal cues and 90% accuracy, JEANNA- minimal cues (initial phoneme) and 85% accuracy      -Less perseverations during JEANNA this date      Met x2      5. Patient will participate in ongoing assessment of appropriateness of low tech and high tech AAC to supplement verbal speech and meet communication needs       Progressing/ Not Met 2/12/2025    "-Low tech AAC used throughout therapy via paper and mini notebook given to patient; patient's preferred method to communicate during breakdowns is through writing in the air or paper and gestures    - High tech AAC explored today via TD Snap; patient can navigate up to 2 folders when asked questions such as "What would you select if you ___"           Treatment       Time Entry(in minutes):  Speech Treatment (Individual) Time Entry: 43    Assessment & Plan   Assessment          Patient will continue to benefit from skilled outpatient speech therapy to address the deficits listed in the problem list box on initial evaluation, provide pt/family education and to maximize pt's level of independence in the home and community environment.     Patient's spiritual, cultural, and educational needs considered and patient agreeable to plan of care and goals.          Plan             Goals:   Active       Long Term Goals       Long term goal 1 (Progressing)       Start:  02/06/25    Expected End:  04/30/25       Pt will develop functional expressive language skills to communicate wants,needs, and emotions effectively to variety of communication partners in medical and home environments            Short Term Goals       Short term goal 1 (Progressing)       Start:  02/06/25    Expected End:  04/30/25       Pt will name objects with 90% acc given initial phoneme cue as needed across 3 sessions           Short term goal 2 (Met)       Start:  02/06/25    Expected End:  04/30/25    Resolved:  04/11/25    Pt will provide 3+ words to describe a given picture/object at 80% success given min cues for initiation/word retrieval.          Short term goal 3 (Met)       Start:  02/06/25    Expected End:  04/30/25    Resolved:  04/09/25    Pt will perform variety of verbal closure tasks for phrases and sentences, given initial phoneme cue as needed, at 90% success rate across 3 sessions.         Short term goal 4 (Progressing)       Start:  " 02/06/25    Expected End:  04/30/25       Pt will perform automatic speech tasks given initial cues with 90% acc          Short term goal 5 (Progressing)       Start:  02/06/25    Expected End:  04/30/25       Patient will participate in ongoing assessment of appropriateness of low tech and high tech AAC to supplement verbal speech and meet communication needs             Leia Figueredo CF-SLP

## 2025-04-17 ENCOUNTER — CLINICAL SUPPORT (OUTPATIENT)
Dept: REHABILITATION | Facility: HOSPITAL | Age: 80
End: 2025-04-17
Payer: MEDICARE

## 2025-04-17 DIAGNOSIS — R47.01 APHASIA DUE TO ACUTE STROKE: Primary | ICD-10-CM

## 2025-04-17 DIAGNOSIS — I63.9 APHASIA DUE TO ACUTE STROKE: Primary | ICD-10-CM

## 2025-04-17 DIAGNOSIS — R48.2 APRAXIA OF SPEECH: ICD-10-CM

## 2025-04-17 PROCEDURE — 92507 TX SP LANG VOICE COMM INDIV: CPT | Mod: PO

## 2025-04-17 NOTE — PROGRESS NOTES
Outpatient Rehab    Speech-Language Pathology Visit    Patient Name: Agapito Bass  MRN: [id number]  YOB: 1945  Encounter Date: 4/17/2025    Therapy Diagnosis:   Encounter Diagnoses   Name Primary?    Aphasia due to acute stroke Yes    Apraxia of speech        Physician: Linn Alcocer FNP    Physician Orders: Eval and Treat  Medical Diagnosis: Aphasia due to acute stroke    Visit # / Visits Authorized: 20 / 40   Insurance Authorization Period: 2/5/2025 to 12/31/2025  Date of Evaluation: 2/6/2025   Plan of Care Certification: 2/6/2025 to 4/30/2025     Time In: 0846   Time Out: 0930  Total Time: 44   Total Billable Time: 44     Subjective   Patient arrived on time and in pleasant spirits. Reports sinuses have been bothering him.           Objective          Treatment     Short Term Goals: (12 weeks) Current Progress:   Pt will name objects with 90% acc given initial phoneme cue as needed across 3 sessions        Progressing/ Not Met 2/12/2025   - Named objects with 90% accuracy given initial phoneme cues    - Benefited from written down multisyllabic words broken down to support verbal output        Met x2      2. Pt will provide 3+ words to describe a given picture/object at 80% success given min cues for initiation/word retrieval.         Progressing/ Not Met 2/12/2025   Met Goal       3. Pt will perform variety of verbal closure tasks for phrases and sentences, given initial phoneme cue as needed, at 90% success rate across 3 sessions.        Progressing/ Not Met 2/12/2025   Met goal         4. Pt will perform automatic speech tasks given initial cues with 90% acc       Progressing/ Not Met 2/12/2025   -counting 1-10 independently with 100% accuracy; Day of the Week minimal cues and 90% accuracy, JEANNA- minimal cues (initial phoneme) and 85% accuracy      -Less perseverations during JEANNA this date      Met x2      5. 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 Pt with less difficulty reciting JEANNA; however, uses written aid to support verbalization. More communication successes noted with familiar communication partners opposed to unfamiliar. When communication breakdowns occur, patient utilizes written aids to support communication with partner. Time spent exploring and creating profile on [employer]; patient demonstrated success navigating up to 2 folders to locate target words. Patient will continue to benefit from skilled speech therapy intervention to improve expressive language. He is progressing well towards his goals. Current goals remain appropriate and will updated as needed.       Patient will continue to benefit from skilled outpatient speech therapy to address the deficits listed in the problem list box on initial evaluation, provide pt/family education and to maximize pt's level of independence in the home and community environment.     Patient's spiritual, cultural, and educational needs considered and patient agreeable to plan of care and goals.          Plan  Continue ST POC          Goals:   Active       Long Term Goals       Long term goal 1 (Progressing)       Start:  02/06/25    Expected End:  04/30/25       Pt will develop functional expressive language skills to communicate wants,needs, and emotions effectively to variety of  communication partners in medical and home environments            Short Term Goals       Short term goal 1 (Progressing)       Start:  02/06/25    Expected End:  04/30/25       Pt will name objects with 90% acc given initial phoneme cue as needed across 3 sessions           Short term goal 2 (Met)       Start:  02/06/25    Expected End:  04/30/25    Resolved:  04/11/25    Pt will provide 3+ words to describe a given picture/object at 80% success given min cues for initiation/word retrieval.          Short term goal 3 (Met)       Start:  02/06/25    Expected End:  04/30/25    Resolved:  04/09/25    Pt will perform variety of verbal closure tasks for phrases and sentences, given initial phoneme cue as needed, at 90% success rate across 3 sessions.         Short term goal 4 (Progressing)       Start:  02/06/25    Expected End:  04/30/25       Pt will perform automatic speech tasks given initial cues with 90% acc          Short term goal 5 (Progressing)       Start:  02/06/25    Expected End:  04/30/25       Patient will participate in ongoing assessment of appropriateness of low tech and high tech AAC to supplement verbal speech and meet communication needs             BRENDAN Braden-SLP

## 2025-05-02 ENCOUNTER — CLINICAL SUPPORT (OUTPATIENT)
Dept: REHABILITATION | Facility: HOSPITAL | Age: 80
End: 2025-05-02
Payer: MEDICARE

## 2025-05-02 DIAGNOSIS — R48.2 APRAXIA OF SPEECH: ICD-10-CM

## 2025-05-02 DIAGNOSIS — R47.01 APHASIA DUE TO ACUTE STROKE: Primary | ICD-10-CM

## 2025-05-02 DIAGNOSIS — I63.9 APHASIA DUE TO ACUTE STROKE: Primary | ICD-10-CM

## 2025-05-02 PROCEDURE — 92507 TX SP LANG VOICE COMM INDIV: CPT | Mod: PO

## 2025-05-06 ENCOUNTER — CLINICAL SUPPORT (OUTPATIENT)
Dept: REHABILITATION | Facility: HOSPITAL | Age: 80
End: 2025-05-06
Payer: MEDICARE

## 2025-05-06 DIAGNOSIS — I63.9 APHASIA DUE TO ACUTE STROKE: Primary | ICD-10-CM

## 2025-05-06 DIAGNOSIS — R48.2 APRAXIA OF SPEECH: ICD-10-CM

## 2025-05-06 DIAGNOSIS — R47.01 APHASIA DUE TO ACUTE STROKE: Primary | ICD-10-CM

## 2025-05-06 PROCEDURE — 92507 TX SP LANG VOICE COMM INDIV: CPT | Mod: PO

## 2025-05-06 NOTE — PROGRESS NOTES
"  Outpatient Rehab  Speech-Language Pathology Visit    Patient Name: Agapito Bass  MRN: [id number]  YOB: 1945  Encounter Date: 5/6/2025    Therapy Diagnosis:   Encounter Diagnoses   Name Primary?    Aphasia due to acute stroke Yes    Apraxia of speech      Physician: Linn Alcocer FNP    Physician Orders: Eval and Treat  Medical Diagnosis: Aphasia due to acute stroke    Visit # / Visits Authorized: 22 / 40   Insurance Authorization Period: 2/5/2025 to 12/31/2025  Date of Evaluation: 2/6/2025   Plan of Care Certification: 5/2/2025 to 7/11/2025     Time In: 0930   Time Out: 1015  Total Time (in minutes): 45   Total Billable Time (in minutes): 45      Subjective   Pt arrived on time with no reported complaints.  Pain reported as 0/10. no pain reported      Objective          Treatment  Short Term Goals:  Current Progress:   Patient will initiate use of written expression (e.g., writing initial letters or full words) to repair communication breakdowns with 90% accuracy, given minimal cues.     Progressing/ Not Met 5/6/2025   -Patient consistently utilized writing to attempt to repair communication breakdowns with minimal cues in session. Though spelling errors occurred,  significant improvements in communication success noted with use of writing to supplement verbal communication    Met x1 session   Pt will respond accurately to simple "wh" questions for one word response, at 90% success across 3 sessions.      Progressing/ Not Met 5/6/2025   -Patient responded to simple "wh" questions with one word response with 80% accuracy given minimal-moderate cues; patient benefited from writing the response at times in session      Patient will respond to personally relevant questions via any means (verbal, gestures, or utilizing AAC) with minimal cues in 90% of opportunities.      Progressing/ Not Met 5/6/2025   -Patient responded verbally to all questions in session this date    Met x1 session    Patient will " imitate 2-3 syllable target words or phrases using integral stimulation with 80% accuracy across 3 sessions.      Progressing/ Not Met 5/6/2025   -Not addressed this date    Pt will name objects with 90% acc given initial phoneme cue as needed across 3 sessions      Progressing/ Not Met 5/6/2025   -Patient named objects given initial phoneme cue with 70% accuracy in session   Pt will perform automatic speech tasks given initial cues with 90% acc      Progressing/ Not Met 5/6/2025   -Counting 1-10 100% accuracy independently    -Days of the Week 90% accuracy given initial phoneme cue    -Months of the Year 70% accuracy given initial phoneme cue and written words            Time Entry(in minutes):  Speech Treatment (Individual) Time Entry: 45    Assessment & Plan   Assessment  Patient participated well in today's session focused on oral/verbal expression and receptive language. Intelligibility continues to be limited by apraxia of speech with familiar communication partner having more success with communication interactions. Pt continues to have significant difficulty with reciting JEANNA for automatic speech tasks and word approximations noted rather than accurate word pronunciation due to AOS. When communication breakdowns occur, patient utilizes written aids to support communication with partner. Less cues needed for utilizing writing compared to prior sessions as pt would often attempt to write without cues when communication breakdowns occurred. Patient will continue to benefit from skilled speech therapy intervention to improve expressive language. He is progressing well towards his goals. Current goals remain appropriate and will updated as needed.       Patient will continue to benefit from skilled outpatient speech therapy to address the deficits listed in the problem list box on initial evaluation, provide pt/family education and to maximize pt's level of independence in the home and community environment.  "    Patient's spiritual, cultural, and educational needs considered and patient agreeable to plan of care and goals.     Education  Education was done with Patient and Other recipient present. The patient's learning style includes Listening. The patient Verbalizes understanding and Requires continuing/additional education.  They identified as Spouse/significant other. The reported learning style is Listening. The recipient Verbalizes understanding.              Plan  Continue ST POC          Goals:   Active       1. Short term goals       Stg 2 (Progressing)       Start:  05/06/25    Expected End:  07/11/25       Patient will initiate use of written expression (e.g., writing initial letters or full words) to repair communication breakdowns with 90% accuracy, given minimal cues.         Stg 3 (Progressing)       Start:  05/06/25    Expected End:  07/11/25       Pt will respond accurately to simple "wh" questions for one word response, at 90% success across 3 sessions.          Stg 5 (Progressing)       Start:  05/06/25    Expected End:  07/11/25       Patient will respond to personally relevant questions via any means (verbal, gestures, or utilizing AAC) with minimal cues in 90% of opportunities.         Stg 6 (Progressing)       Start:  05/06/25    Expected End:  07/11/25       Patient will imitate 2-3 syllable target words or phrases using integral stimulation with 80% accuracy across 3 sessions.             Long Term Goals       Long term goal 1 (Progressing)       Start:  02/06/25    Expected End:  07/11/25       Pt will develop functional expressive language skills to communicate wants,needs, and emotions effectively to variety of communication partners in medical and home environments            Short Term Goals       Short term goal 1 (Progressing)       Start:  02/06/25    Expected End:  07/11/25       Pt will name objects with 90% acc given initial phoneme cue as needed across 3 sessions           Short term " goal 2 (Met)       Start:  02/06/25    Expected End:  04/30/25    Resolved:  04/11/25    Pt will provide 3+ words to describe a given picture/object at 80% success given min cues for initiation/word retrieval.          Short term goal 3 (Met)       Start:  02/06/25    Expected End:  04/30/25    Resolved:  04/09/25    Pt will perform variety of verbal closure tasks for phrases and sentences, given initial phoneme cue as needed, at 90% success rate across 3 sessions.         Short term goal 4 (Progressing)       Start:  02/06/25    Expected End:  07/11/25       Pt will perform automatic speech tasks given initial cues with 90% acc          Short term goal 5 (Met)       Start:  02/06/25    Expected End:  04/30/25    Resolved:  05/06/25    Patient will participate in ongoing assessment of appropriateness of low tech and high tech AAC to supplement verbal speech and meet communication needs             Marietta Castle, L-SLP, CCC-SLP

## 2025-05-06 NOTE — PROGRESS NOTES
"  Outpatient Rehab    Speech-Language Pathology Progress Note : Updated Plan of Care    Patient Name: Agapito Bass  MRN: [id number]  YOB: 1945  Encounter Date: 5/2/2025    Therapy Diagnosis:   Encounter Diagnoses   Name Primary?    Aphasia due to acute stroke Yes    Apraxia of speech      Physician: Linn Alcocer FNP    Physician Orders: Eval and Treat  Medical Diagnosis: Aphasia due to acute stroke    Visit # / Visits Authorized: 21 / 40   Insurance Authorization Period: 2/5/2025 to 12/31/2025  Date of Evaluation: 2/6/2025   Plan of Care Certification: 5/2/2025 to 7/11/2025     Time In: 1254   Time Out: 1339  Total Time (in minutes): 45   Total Billable Time (in minutes): 45    Subjective   Pt arrived on time and in pleasant spirits.       Family/caregiver present for this visit:   Wife attended first 5 minutes to discuss UPPO    Objective            Treatment     Short Term Goals: (12 weeks) Current Progress:   Pt will name objects with 90% acc given initial phoneme cue as needed across 3 sessions            Progressing/ Not Met 5/2/2025     - Named objects with 85% accuracy given initial phoneme cues; pt consistent omission of initial phonemes this date    -Benefits from repeating single-syllable words sharing the same initial phoneme to increase accuracy of initial sound production of target word     Met x2      2.  Patient will initiate use of written expression (e.g., writing initial letters or full words) to repair communication breakdowns with 90% accuracy, given minimal cues.      Progressing/ Not Met 5/2/2025     Added this date      3.  Pt will respond accurately to simple "wh" questions for one word response, at 90% success across 3 sessions.      Progressing/ Not Met 5/2/2025   Added this date         4. Pt will perform automatic speech tasks given initial cues with 90% acc       Progressing/ Not Met 5/2/2025   -counting 1-10 independently with 100% accuracy; Day of the Week minimal " cues and 90% accuracy, JEANNA- minimal cues (initial phoneme) and 85% accuracy     -Given visual cues and months broken down into smaller segments, pt accuracy increased to 90%      Met x2      5. Patient will respond to personally relevant questions via any means (verbal, gestures, or utilizing AAC) with minimal cues in 90% of opportunities.     Progressing/ Not Met 5/2/2025    Added this date      6. Patient will imitate 2-3 syllable target words or phrases using integral stimulation with 80% accuracy across 3 sessions       Progressing/ Not Met 5/2/2025   Added this date        Time Entry(in minutes):  Speech Treatment (Individual) Time Entry: 45    Assessment & Plan   Assessment   Prognosis: Good      Assessment Details: Patient participated well in today's session targeting expressive language and updating plan of care. Spouse attended first 5 minutes to discuss the revised goals; both the patient and spouse verbalized and demonstrated understanding of the UPPOC. Decreased perseveration was noted compared to previous sessions. Patient continues to demonstrate consistent difficulty verbalizing JEANNA due to multisyllabic nature of the task and perseveration on previous automatic speech tasks. Pt benefits from visual supports breaking words into smaller segments. Patient consistently omits initial phonemes during object identification tasks, even when provided with phonemic cues; however, performance improves when cued with single-syllable words sharing the same initial phoneme. More communication success is observed with familiar communication partners compared to unfamiliar partners. Patient demonstrates high motivation to participate in therapy and work toward his goals. He continues to benefit from skilled speech-language therapy services to address expressive language deficits.     Plan  From a speech language pathology perspective, the patient would benefit from: Skilled Rehab Services    Planned therapy  "interventions and modalities include: Speech/language therapy.        Visit Frequency: 2 times Per Week for 10 Weeks.     This plan was discussed with Patient and Family.   Discussion participants: Agreed Upon Plan of Care    Plan details: Continue updated plan of care to address expressive language deficits    Patient will continue to benefit from skilled outpatient speech therapy to address the deficits listed in the problem list box on initial evaluation, provide pt/family education and to maximize pt's level of independence in the home and community environment.     Patient's spiritual, cultural, and educational needs considered and patient agreeable to plan of care and goals.     Goals:   Active       1. Short term goals       Stg 2       Start:  05/06/25    Expected End:  07/11/25       Patient will initiate use of written expression (e.g., writing initial letters or full words) to repair communication breakdowns with 90% accuracy, given minimal cues.         Stg 3       Start:  05/06/25    Expected End:  07/11/25       Pt will respond accurately to simple "wh" questions for one word response, at 90% success across 3 sessions.          Stg 5       Start:  05/06/25    Expected End:  07/11/25       Patient will respond to personally relevant questions via any means (verbal, gestures, or utilizing AAC) with minimal cues in 90% of opportunities.         Stg 6       Start:  05/06/25    Expected End:  07/11/25       Patient will imitate 2-3 syllable target words or phrases using integral stimulation with 80% accuracy across 3 sessions.             Long Term Goals       Long term goal 1 (Progressing)       Start:  02/06/25    Expected End:  07/11/25       Pt will develop functional expressive language skills to communicate wants,needs, and emotions effectively to variety of communication partners in medical and home environments            Short Term Goals       Short term goal 1 (Ongoing)       Start:  02/06/25    " Expected End:  07/11/25       Pt will name objects with 90% acc given initial phoneme cue as needed across 3 sessions           Short term goal 2 (Met)       Start:  02/06/25    Expected End:  04/30/25    Resolved:  04/11/25    Pt will provide 3+ words to describe a given picture/object at 80% success given min cues for initiation/word retrieval.          Short term goal 3 (Met)       Start:  02/06/25    Expected End:  04/30/25    Resolved:  04/09/25    Pt will perform variety of verbal closure tasks for phrases and sentences, given initial phoneme cue as needed, at 90% success rate across 3 sessions.         Short term goal 4 (Ongoing)       Start:  02/06/25    Expected End:  07/11/25       Pt will perform automatic speech tasks given initial cues with 90% acc          Short term goal 5 (Met)       Start:  02/06/25    Expected End:  04/30/25    Resolved:  05/06/25    Patient will participate in ongoing assessment of appropriateness of low tech and high tech AAC to supplement verbal speech and meet communication needs             BRENDAN Braden-SLP

## 2025-05-22 ENCOUNTER — CLINICAL SUPPORT (OUTPATIENT)
Dept: REHABILITATION | Facility: HOSPITAL | Age: 80
End: 2025-05-22
Payer: MEDICARE

## 2025-05-22 DIAGNOSIS — I63.9 APHASIA DUE TO ACUTE STROKE: Primary | ICD-10-CM

## 2025-05-22 DIAGNOSIS — R47.01 APHASIA DUE TO ACUTE STROKE: Primary | ICD-10-CM

## 2025-05-22 DIAGNOSIS — R48.2 APRAXIA OF SPEECH: ICD-10-CM

## 2025-05-22 PROCEDURE — 92507 TX SP LANG VOICE COMM INDIV: CPT | Mod: PO

## 2025-05-22 NOTE — PROGRESS NOTES
"  Outpatient Rehab  Speech-Language Pathology Visit    Patient Name: Agapito Bass  MRN: [id number]  YOB: 1945  Encounter Date: 5/22/2025    Therapy Diagnosis:   Encounter Diagnoses   Name Primary?    Aphasia due to acute stroke Yes    Apraxia of speech      Physician: Linn Alcocer FNP    Physician Orders: Eval and Treat  Medical Diagnosis: Aphasia due to acute stroke    Visit # / Visits Authorized: 23 / 40   Insurance Authorization Period: 2/5/2025 to 12/31/2025  Date of Evaluation: 2/5/2025   Plan of Care Certification: 5/2/2025 to 7/11/2025      Time In: 0845   Time Out: 0930  Total Time (in minutes): 45   Total Billable Time (in minutes): 45      Precautions:  Standard, fall, communication    Subjective   Pt arrived on time and in pleasant spirits.  Pain reported as 0/10. no pain reported      Objective          Treatment  Short Term Goals:  Current Progress:   Patient will initiate use of written expression (e.g., writing initial letters or full words) to repair communication breakdowns with 90% accuracy, given minimal cues.      Progressing/ Not Met 5/22/2025   -Patient consistently utilized writing to attempt to repair communication breakdowns with minimal cues in session. Though spelling errors occurred,  significant improvements in communication success noted with use of writing to supplement verbal communication     Met x2 sessions   Pt will respond accurately to simple "wh" questions for one word response, at 90% success across 3 sessions.       Progressing/ Not Met 5/22/2025   -Not addressed this date      Patient will respond to personally relevant questions via any means (verbal, gestures, or utilizing AAC) with minimal cues in 90% of opportunities.       Progressing/ Not Met 5/22/2025   -Not addressed this date     Met x1 session    Patient will imitate 2-3 syllable target words or phrases using integral stimulation with 80% accuracy across 3 sessions.       Progressing/ Not Met " 5/22/2025   -With maximal cues (written, verbal, visual) patient imitated multi-syllabic target words with 60% accuracy. Word approximations noted which were grossly intelligible to known communication partner in familiar context    -Inconsistent errors due to apraxia of speech   Pt will name objects with 90% acc given initial phoneme cue as needed across 3 sessions       Progressing/ Not Met 5/22/2025   -Patient named objects given initial phoneme cue with 80% accuracy in session    -Patient often wrote correct word when he was having difficulty producing verbally    -Often word approximations noted     Pt will perform automatic speech tasks given initial cues with 90% acc       Progressing/ Not Met 5/22/2025   -Not addressed this date       Time Entry(in minutes):  Speech Treatment (Individual) Time Entry: 45    Assessment & Plan   Assessment  Patient participated well in today's session focused on oral/verbal expression. Intelligibility continues to be limited by apraxia of speech with familiar communication partner having more success with communication interactions. Frequent word approximations noted which, given familiar context and known communication partner improves communication success. Pt independently utilized writing intended words and messages during breakdowns. Overall improvements in spelling accuracy noted. Patient will continue to benefit from skilled speech therapy intervention to improve expressive language. He is progressing well towards his goals. Current goals remain appropriate and will updated as needed.  Evaluation/Treatment Tolerance: Patient tolerated treatment well    The patient will continue to benefit from skilled outpatient speech therapy in order to address the deficits listed in the problem list on the initial evaluation, provide patient and family education, and maximize the patients level of independence in the home and community environments.     The patient's spiritual,  "cultural, and educational needs were considered, and the patient is agreeable to the plan of care and goals.     Education  Education was done with Patient. The patient's learning style includes Listening. The patient Verbalizes understanding.                  Plan  Continue ST POC          Goals:   Active       1. Short term goals       Stg 2 (Progressing)       Start:  05/06/25    Expected End:  07/11/25       Patient will initiate use of written expression (e.g., writing initial letters or full words) to repair communication breakdowns with 90% accuracy, given minimal cues.         Stg 3 (Progressing)       Start:  05/06/25    Expected End:  07/11/25       Pt will respond accurately to simple "wh" questions for one word response, at 90% success across 3 sessions.          Stg 5 (Progressing)       Start:  05/06/25    Expected End:  07/11/25       Patient will respond to personally relevant questions via any means (verbal, gestures, or utilizing AAC) with minimal cues in 90% of opportunities.         Stg 6 (Progressing)       Start:  05/06/25    Expected End:  07/11/25       Patient will imitate 2-3 syllable target words or phrases using integral stimulation with 80% accuracy across 3 sessions.             Long Term Goals       Long term goal 1 (Progressing)       Start:  02/06/25    Expected End:  07/11/25       Pt will develop functional expressive language skills to communicate wants,needs, and emotions effectively to variety of communication partners in medical and home environments            Short Term Goals       Short term goal 1 (Progressing)       Start:  02/06/25    Expected End:  07/11/25       Pt will name objects with 90% acc given initial phoneme cue as needed across 3 sessions           Short term goal 2 (Met)       Start:  02/06/25    Expected End:  04/30/25    Resolved:  04/11/25    Pt will provide 3+ words to describe a given picture/object at 80% success given min cues for initiation/word " retrieval.          Short term goal 3 (Met)       Start:  02/06/25    Expected End:  04/30/25    Resolved:  04/09/25    Pt will perform variety of verbal closure tasks for phrases and sentences, given initial phoneme cue as needed, at 90% success rate across 3 sessions.         Short term goal 4 (Progressing)       Start:  02/06/25    Expected End:  07/11/25       Pt will perform automatic speech tasks given initial cues with 90% acc          Short term goal 5 (Met)       Start:  02/06/25    Expected End:  04/30/25    Resolved:  05/06/25    Patient will participate in ongoing assessment of appropriateness of low tech and high tech AAC to supplement verbal speech and meet communication needs             SALVADOR Sher-SLP, CCC-SLP

## 2025-05-28 ENCOUNTER — CLINICAL SUPPORT (OUTPATIENT)
Dept: REHABILITATION | Facility: HOSPITAL | Age: 80
End: 2025-05-28
Payer: MEDICARE

## 2025-05-28 DIAGNOSIS — I63.9 APHASIA DUE TO ACUTE STROKE: Primary | ICD-10-CM

## 2025-05-28 DIAGNOSIS — R48.2 APRAXIA OF SPEECH: ICD-10-CM

## 2025-05-28 DIAGNOSIS — R47.01 APHASIA DUE TO ACUTE STROKE: Primary | ICD-10-CM

## 2025-05-28 PROCEDURE — 92507 TX SP LANG VOICE COMM INDIV: CPT | Mod: PO

## 2025-05-28 NOTE — PROGRESS NOTES
"  Outpatient Rehab  Speech-Language Pathology Visit    Patient Name: Agapito Bass  MRN: [id number]  YOB: 1945  Encounter Date: 5/28/2025    Therapy Diagnosis:   Encounter Diagnoses   Name Primary?    Aphasia due to acute stroke Yes    Apraxia of speech      Physician: Linn Alcocer FNP    Physician Orders: Eval and Treat  Medical Diagnosis: Aphasia due to acute stroke    Visit # / Visits Authorized: 24 / 40   Insurance Authorization Period: 2/5/2025 to 12/31/2025  Date of Evaluation: 2/5/2025   Plan of Care Certification: 5/2/2025 to 7/11/2025      Time In: 1100   Time Out: 1145  Total Time (in minutes): 45   Total Billable Time (in minutes): 45      Precautions:  Standard, fall, communication    Subjective   Pt arrived on time and in pleasant.    no pain reported      Objective          Treatment  Short Term Goals:  Current Progress:   Patient will initiate use of written expression (e.g., writing initial letters or full words) to repair communication breakdowns with 90% accuracy, given minimal cues.      Progressing/ Not Met 5/22/2025   -Patient consistently used writing to attempt to repair communication breakdowns with minimal cueing during the session. While occasional spelling errors were present, the use of writing significantly improved overall communication success by effectively supplementing verbal expression     Met x3 sessions 5/28/25   Pt will respond accurately to simple "wh" questions for one word response, at 90% success across 3 sessions.       Progressing/ Not Met 5/22/2025   -Pt responded accurately to simple "wh" questions with 90% accuracy given initial phoneme cues as needed    -Pt utilized written aid to write responses when having difficulty verbalizing answers    Met x1      Patient will respond to personally relevant questions via any means (verbal, gestures, or utilizing AAC) with minimal cues in 90% of opportunities.       Progressing/ Not Met 5/22/2025   -Not addressed " this date     Met x1 session    Patient will imitate 2-3 syllable target words or phrases using integral stimulation with 80% accuracy across 3 sessions.       Progressing/ Not Met 5/22/2025   -Not addressed in today's session.     Pt will name objects with 90% acc given initial phoneme cue as needed across 3 sessions       Progressing/ Not Met 5/22/2025   -Not addressed in today's session.       Pt will perform automatic speech tasks given initial cues with 90% acc       Progressing/ Not Met 5/22/2025   -Not addressed this date       Time Entry(in minutes):  Speech Treatment (Individual) Time Entry: 45    Assessment & Plan   Assessment  Patient participated well in todays session, which targeted both written and oral/verbal expression. Intelligibility continues to be impacted by apraxia of speech, though communication success is improved when interacting with a familiar communication partner. Frequent word approximations were noted; however, in the context of familiar topics and known partners, overall communicative effectiveness improved. Patient independently utilized writing to convey intended words and messages during communication breakdowns and throughout structured wh question task. Overall improvements in spelling accuracy were observed. The patient continues to benefit from skilled speech therapy services to support expressive language development and is progressing well toward his goals. Current goals remain appropriate and will be updated as needed.  Evaluation/Treatment Tolerance: Patient tolerated treatment well    The patient will continue to benefit from skilled outpatient speech therapy in order to address the deficits listed in the problem list on the initial evaluation, provide patient and family education, and maximize the patients level of independence in the home and community environments.     The patient's spiritual, cultural, and educational needs were considered, and the patient is  "agreeable to the plan of care and goals.            Plan  Continue ST POC          Goals:   Active       1. Short term goals       Stg 2 (Met)       Start:  05/06/25    Expected End:  07/11/25    Resolved:  05/28/25    Patient will initiate use of written expression (e.g., writing initial letters or full words) to repair communication breakdowns with 90% accuracy, given minimal cues.         Stg 3 (Progressing)       Start:  05/06/25    Expected End:  07/11/25       Pt will respond accurately to simple "wh" questions for one word response, at 90% success across 3 sessions.          Stg 5 (Progressing)       Start:  05/06/25    Expected End:  07/11/25       Patient will respond to personally relevant questions via any means (verbal, gestures, or utilizing AAC) with minimal cues in 90% of opportunities.         Stg 6 (Progressing)       Start:  05/06/25    Expected End:  07/11/25       Patient will imitate 2-3 syllable target words or phrases using integral stimulation with 80% accuracy across 3 sessions.             Long Term Goals       Long term goal 1 (Progressing)       Start:  02/06/25    Expected End:  07/11/25       Pt will develop functional expressive language skills to communicate wants,needs, and emotions effectively to variety of communication partners in medical and home environments            Short Term Goals       Short term goal 1 (Progressing)       Start:  02/06/25    Expected End:  07/11/25       Pt will name objects with 90% acc given initial phoneme cue as needed across 3 sessions           Short term goal 2 (Met)       Start:  02/06/25    Expected End:  04/30/25    Resolved:  04/11/25    Pt will provide 3+ words to describe a given picture/object at 80% success given min cues for initiation/word retrieval.          Short term goal 3 (Met)       Start:  02/06/25    Expected End:  04/30/25    Resolved:  04/09/25    Pt will perform variety of verbal closure tasks for phrases and sentences, given " initial phoneme cue as needed, at 90% success rate across 3 sessions.         Short term goal 4 (Progressing)       Start:  02/06/25    Expected End:  07/11/25       Pt will perform automatic speech tasks given initial cues with 90% acc          Short term goal 5 (Met)       Start:  02/06/25    Expected End:  04/30/25    Resolved:  05/06/25    Patient will participate in ongoing assessment of appropriateness of low tech and high tech AAC to supplement verbal speech and meet communication needs             BRENDAN Braden-SLP

## 2025-05-30 ENCOUNTER — CLINICAL SUPPORT (OUTPATIENT)
Dept: REHABILITATION | Facility: HOSPITAL | Age: 80
End: 2025-05-30
Payer: MEDICARE

## 2025-05-30 DIAGNOSIS — R48.2 APRAXIA OF SPEECH: ICD-10-CM

## 2025-05-30 DIAGNOSIS — R47.01 APHASIA DUE TO ACUTE STROKE: Primary | ICD-10-CM

## 2025-05-30 DIAGNOSIS — I63.9 APHASIA DUE TO ACUTE STROKE: Primary | ICD-10-CM

## 2025-05-30 PROCEDURE — 92507 TX SP LANG VOICE COMM INDIV: CPT | Mod: PO

## 2025-05-30 NOTE — PROGRESS NOTES
"  Outpatient Rehab  Speech-Language Pathology Visit    Patient Name: Agapito Bass  MRN: [id number]  YOB: 1945  Encounter Date: 5/30/2025    Therapy Diagnosis:   Encounter Diagnoses   Name Primary?    Aphasia due to acute stroke Yes    Apraxia of speech        Physician: Linn Alcocer FNP    Physician Orders: Eval and Treat  Medical Diagnosis: Aphasia due to acute stroke    Visit # / Visits Authorized: 25 / 40   Insurance Authorization Period: 2/5/2025 to 12/31/2025  Date of Evaluation: 2/5/2025   Plan of Care Certification: 5/2/2025 to 7/11/2025      Time In: 0935   Time Out: 1015  Total Time (in minutes): 40   Total Billable Time (in minutes): 40      Precautions:  Standard, fall, communication    Subjective   Patient arrived on time and in pleasant spirits.    no pain reported      Objective          Treatment  Short Term Goals:  Current Progress:   Patient will initiate use of written expression (e.g., writing initial letters or full words) to repair communication breakdowns with 90% accuracy, given minimal cues.      Progressing/ Not Met 5/22/2025        Met 5/28/25   Pt will respond accurately to simple "wh" questions for one word response, at 90% success across 3 sessions.       Progressing/ Not Met 5/22/2025   -Not addressed in today's session.      Met x1      Patient will respond to personally relevant questions via any means (verbal, gestures, or utilizing AAC) with minimal cues in 90% of opportunities.       Progressing/ Not Met 5/22/2025   -Patient responded to personally relevant questions via written communication for majority of session. Patient prefers to communicate verbally; however, with communication breakdowns, utilized written aid to repair      Met x2 session    Patient will imitate 2-3 syllable target words or phrases using integral stimulation with 80% accuracy across 3 sessions.       Progressing/ Not Met 5/22/2025   -Not addressed in today's session.     Pt will name " objects with 90% acc given initial phoneme cue as needed across 3 sessions       Progressing/ Not Met 5/22/2025   -Not addressed in today's session.       Pt will perform automatic speech tasks given initial cues with 90% acc       Progressing/ Not Met 5/22/2025   -Not addressed this date       Time Entry(in minutes):  Speech Treatment (Individual) Time Entry: 40    Assessment & Plan   Assessment  Patient was actively engaged in todays session, which focused on both spoken and written language. Speech intelligibility remains affected by apraxia, but overall communication was more successful when speaking with familiar listeners. Word approximations were frequent; however, the patient was more effective in getting his message across when discussing familiar topics. He independently used writing to clarify words and support communication during breakdowns and casual conversation. Patient continues to make steady progress toward his goals and benefits from ongoing speech therapy services to strengthen expressive language skills. Goals remain appropriate at this time and will be adjusted as needed.  Evaluation/Treatment Tolerance: Patient tolerated treatment well    The patient will continue to benefit from skilled outpatient speech therapy in order to address the deficits listed in the problem list on the initial evaluation, provide patient and family education, and maximize the patients level of independence in the home and community environments.     The patient's spiritual, cultural, and educational needs were considered, and the patient is agreeable to the plan of care and goals.            Plan  Continue ST POC          Goals:   Active       1. Short term goals       Stg 2 (Met)       Start:  05/06/25    Expected End:  07/11/25    Resolved:  05/28/25    Patient will initiate use of written expression (e.g., writing initial letters or full words) to repair communication breakdowns with 90% accuracy, given minimal  "cues.         Stg 3 (Progressing)       Start:  05/06/25    Expected End:  07/11/25       Pt will respond accurately to simple "wh" questions for one word response, at 90% success across 3 sessions.          Stg 5 (Progressing)       Start:  05/06/25    Expected End:  07/11/25       Patient will respond to personally relevant questions via any means (verbal, gestures, or utilizing AAC) with minimal cues in 90% of opportunities.         Stg 6 (Progressing)       Start:  05/06/25    Expected End:  07/11/25       Patient will imitate 2-3 syllable target words or phrases using integral stimulation with 80% accuracy across 3 sessions.             Long Term Goals       Long term goal 1 (Progressing)       Start:  02/06/25    Expected End:  07/11/25       Pt will develop functional expressive language skills to communicate wants,needs, and emotions effectively to variety of communication partners in medical and home environments            Short Term Goals       Short term goal 1 (Progressing)       Start:  02/06/25    Expected End:  07/11/25       Pt will name objects with 90% acc given initial phoneme cue as needed across 3 sessions           Short term goal 2 (Met)       Start:  02/06/25    Expected End:  04/30/25    Resolved:  04/11/25    Pt will provide 3+ words to describe a given picture/object at 80% success given min cues for initiation/word retrieval.          Short term goal 3 (Met)       Start:  02/06/25    Expected End:  04/30/25    Resolved:  04/09/25    Pt will perform variety of verbal closure tasks for phrases and sentences, given initial phoneme cue as needed, at 90% success rate across 3 sessions.         Short term goal 4 (Progressing)       Start:  02/06/25    Expected End:  07/11/25       Pt will perform automatic speech tasks given initial cues with 90% acc          Short term goal 5 (Met)       Start:  02/06/25    Expected End:  04/30/25    Resolved:  05/06/25    Patient will participate in ongoing " assessment of appropriateness of low tech and high tech AAC to supplement verbal speech and meet communication needs             Leia Figueredo CF-SLP

## 2025-06-03 ENCOUNTER — CLINICAL SUPPORT (OUTPATIENT)
Dept: REHABILITATION | Facility: HOSPITAL | Age: 80
End: 2025-06-03
Payer: MEDICARE

## 2025-06-03 DIAGNOSIS — R47.01 APHASIA DUE TO ACUTE STROKE: Primary | ICD-10-CM

## 2025-06-03 DIAGNOSIS — I63.9 APHASIA DUE TO ACUTE STROKE: Primary | ICD-10-CM

## 2025-06-03 DIAGNOSIS — R48.2 APRAXIA OF SPEECH: ICD-10-CM

## 2025-06-03 PROCEDURE — 92507 TX SP LANG VOICE COMM INDIV: CPT | Mod: PO

## 2025-06-05 ENCOUNTER — CLINICAL SUPPORT (OUTPATIENT)
Dept: REHABILITATION | Facility: HOSPITAL | Age: 80
End: 2025-06-05
Payer: MEDICARE

## 2025-06-05 DIAGNOSIS — R48.2 APRAXIA OF SPEECH: ICD-10-CM

## 2025-06-05 DIAGNOSIS — I63.9 APHASIA DUE TO ACUTE STROKE: Primary | ICD-10-CM

## 2025-06-05 DIAGNOSIS — R47.01 APHASIA DUE TO ACUTE STROKE: Primary | ICD-10-CM

## 2025-06-05 PROCEDURE — 92507 TX SP LANG VOICE COMM INDIV: CPT | Mod: PO
